# Patient Record
Sex: MALE | Race: WHITE | Employment: UNEMPLOYED | URBAN - METROPOLITAN AREA
[De-identification: names, ages, dates, MRNs, and addresses within clinical notes are randomized per-mention and may not be internally consistent; named-entity substitution may affect disease eponyms.]

---

## 2017-02-10 ENCOUNTER — APPOINTMENT (OUTPATIENT)
Dept: GENERAL RADIOLOGY | Facility: CLINIC | Age: 52
End: 2017-02-10
Attending: EMERGENCY MEDICINE
Payer: COMMERCIAL

## 2017-02-10 ENCOUNTER — HOSPITAL ENCOUNTER (EMERGENCY)
Facility: CLINIC | Age: 52
Discharge: HOME | End: 2017-02-10
Attending: EMERGENCY MEDICINE | Admitting: EMERGENCY MEDICINE
Payer: COMMERCIAL

## 2017-02-10 VITALS
BODY MASS INDEX: 23.05 KG/M2 | TEMPERATURE: 99.1 F | HEART RATE: 129 BPM | WEIGHT: 170 LBS | DIASTOLIC BLOOD PRESSURE: 118 MMHG | OXYGEN SATURATION: 96 % | RESPIRATION RATE: 20 BRPM | SYSTOLIC BLOOD PRESSURE: 166 MMHG

## 2017-02-10 DIAGNOSIS — S70.01XA CONTUSION OF RIGHT HIP, INITIAL ENCOUNTER: ICD-10-CM

## 2017-02-10 DIAGNOSIS — F10.929 ALCOHOL INTOXICATION, WITH UNSPECIFIED COMPLICATION (H): ICD-10-CM

## 2017-02-10 PROCEDURE — 73502 X-RAY EXAM HIP UNI 2-3 VIEWS: CPT

## 2017-02-10 PROCEDURE — 99283 EMERGENCY DEPT VISIT LOW MDM: CPT

## 2017-02-10 RX ORDER — ACETAMINOPHEN 500 MG
1000 TABLET ORAL ONCE
Status: DISCONTINUED | OUTPATIENT
Start: 2017-02-10 | End: 2017-02-10 | Stop reason: HOSPADM

## 2017-02-10 RX ORDER — IBUPROFEN 600 MG/1
600 TABLET, FILM COATED ORAL ONCE
Status: DISCONTINUED | OUTPATIENT
Start: 2017-02-10 | End: 2017-02-10 | Stop reason: HOSPADM

## 2017-02-10 ASSESSMENT — ENCOUNTER SYMPTOMS
NECK PAIN: 0
BACK PAIN: 0

## 2017-02-10 NOTE — ED AVS SNAPSHOT
[facility]    [address] [place] [address]    Phone:  [phone]    Fax:  [phone]                                       Sidney Varghese   MRN: [id number]    Department:  [facility]   Date of Visit:  2/10/2017           Patient Information     Date Of Birth          1965        Your diagnoses for this visit were:     Contusion of right hip, initial encounter     Alcohol intoxication, with unspecified complication (H)        You were seen by Yosef Barr MD.        Discharge Instructions       Please make an appointment to follow up with your primary care provider in 4-5 days if not improving.    Use Tylenol and/or Ibuprofen for pain or discomfort          Hip Contusion       A contusion is another word for a bruise. It happens when small blood vessels break open and leak blood into the nearby area. A hip contusion can result from a bump, hit, or fall. Symptoms of a contusion often include changes in skin color (bruising), swelling, and pain. It may take several hours for a deep bruise to show up. If the injury is severe, you may need an x-ray to check for broken bones.  Swelling should decrease in a few days. Bruising and pain may take several weeks to go away.  Home care    Unless another medicine was prescribed, you may take acetaminophen, ibuprofen, or naproxen to help relieve pain and swelling. If needed, stronger pain medicines may be prescribed. Take all medicines exactly as directed.    Ice the bruised area to help reduce pain and swelling. Wrap a cold source (ice pack or ice cubes in a plastic bag) in a thin towel. Apply the cold source to the bruised area for 20 minutes every 1 to 2 hours the first day. Continue this 3 to 4 times a day until the pain and swelling goes away.    If walking causes pain, use crutches or a walker until you can walk without pain. These items can be rented at most  pharmacies and orthopedic supply stores.    If your injury is keeping you from moving around or caring for yourself properly, you may qualify for services such as home health care. Check with your insurance company to see if this type of care is covered.  Follow-up  Follow up with your healthcare provider, or as advised.  When to seek medical advice   Call your healthcare provider right away if any of these occur:    Increased pain, bruising, or swelling near the injured area    Decreased ability to bear weight on the injured side    Pain or swelling develops below the knee    Chest pain or shortness of breath    3742-9851 [employer]. [address] [place] [address]. All rights reserved. This information is not intended as a substitute for professional medical care. Always follow your healthcare professional's instructions.          Alcohol Intoxication  Alcohol intoxication occurs when you drink alcohol faster than your liver can remove it from your system. The following facts are important to remember:    It can take 10 minutes or more to start to feel the effects of a drink, so you can easily get more intoxicated than you intended.    One drink may be more than 1 serving of alcohol. Depending on the drink, it can be 2 to 4 servings.    It takes about an hour for your body to metabolize (clear) 1 serving. If you have more than 1 drink, it can take a couple of hours or more.    Many things affect how drinks will affect you, including whether you ve eaten, how fast you drink, your size, how much you normally drink (or not), medications you take, chronic diseases you have, and gender.  Signs and symptoms of alcohol poisoning  The following are signs and symptoms of alcohol poisoning:  Mild impairment    Reduced inhibitions    Slurred speech    Drowsiness    Decreased fine motor skills  Moderate impairment    Erratic behavior, aggression, depression    Impaired  "judgment    Confusion    Concentration difficulties    Coordination problems  Severe impairment    Vomiting    Seizures    Unconsciousness    Cold, clammy    Slow or irregular breathing    Hypothermia (low body temperature)    Coma  Health effects  Alcohol abuse causes health problems. Sometimes this can happen after only drinking a  little.\" There is no set number of drinks or amount of alcohol that defines too much. The more you drink at one time, and the more frequently you drink determine both the short-term and long-term health effects. It affects all parts of your body and your health, including your:    Brain. Alcohol is a central nervous system depressant. It can damage parts of the brain that affect your balance, memory, thinking, and emotions. It can cause memory loss, blackouts, depression, agitation, sleep cycle changes, and seizures. These changes may or may not be reversible.    Heart and vascular system. Alcohol affects multiple areas. It can damage heart muscle causing cardiomyopathy, which is a weakening and stretching of the heart muscle. This can lead to trouble breathing, an irregular heartbeat, atrial fibrillation, leg swelling, and heart failure. It makes the blood vessels stiffen causing hypertension (high blood pressure). All of these problems increase your risk of having heart attacks or strokes.    Liver. Alcohol causes fat to build up in the liver, affecting its normal function. This increases the risk for hepatitis, leading to abdominal pain, appetite loss, jaundice, bleeding problems, liver fibrosis, and cirrhosis. This in turn can affect your ability to fight off infections, and can cause diabetes. The liver changes prevent it from removing toxins in your blood that can cause encephalopathy. Signs of this are confusion, altered level of consciousness, personality changes, memory loss, seizures, coma, and death.    Pancreas. Alcohol can cause inflammation of the pancreas, or " pancreatitis. This can cause pain in your abdomen, fever, and diabetes.    Immune system. Alcohol weakens your immune system in a number of ways. It suppresses your immune system making it harder to fight off infections and colds. You will also have a higher risk of certain infections like pneumonia and tuberculosis.    Cancer risk. Alcohol raises your risk of cancer of the mouth, esophagus, pharynx, larynx, liver, and breast.    Sexual function. Alcohol abuse can also lead to sexual problems.  Alcohol use during pregnancy may cause permanent damage to the growing baby.  Home care  The following guidelines will help you care for yourself at home:    Don't drink any more alcohol.    Don't drive until all effects of the alcohol have worn off.    Don't operate machinery that can cause injuries.    Get lots of rest over the next few days. Drink plenty of water and other non-alcoholic liquids. Try to eat regular meals.    If you have been drinking heavily on a daily basis, you may go through alcohol withdrawal. The usual symptoms last 3 to 4 days and may include nervousness, shakiness, nausea, sweating, sleeplessness, and can even cause seizures and a serious withdrawal symptom called delirium tremens, or DTs. During this time, it is best that you stay with family or friends who can help and support you. You can also admit yourself to a residential detox program. If your symptoms are severe (seizures, severe shakiness, confusion), contact your doctor or call an ambulance for help (see below).   Follow-up care  If alcohol is a problem in your life, these are some organizations that can help you:    Alcoholics Anonymous offers support through a self-help fellowship. There are no dues or fees. See the Yellow Pages and call for time and place of meetings. Find AA online at www.aa.org.    [employer] offers support to families of alcohol users. Contact 379-401-8885, or online at www.al-anon.org.    National [place] on Alcoholism  and Drug Dependence can be reached at 466-713-4333, or online at www.Zoomdata.org.    There are also inpatient and residential alcohol detox programs. Check the Internet or phonebook Yellow Pages under  Drug Abuse and Treatment Centers.   Call 911  Call 911 if any of these occur:    Trouble breathing or slow irregular breathing    Chest pain    Sudden weakness on one side of your body or sudden trouble speaking    Heavy bleeding or vomiting blood    Very drowsy or trouble awakening    Fainting or loss of consciousness    Rapid heart rate    Seizure  When to seek medical care  Get prompt medical attention if any of the following occur:    Severe shakiness     Fever over 100.4  F (38.0  C)    Confusion or hallucinations (seeing, hearing, or feeling things that are not there)    Pain in your upper abdomen that gets worse    Repeated vomiting    7417-8153 [employer]. [address] [place], [place] [address]. All rights reserved. This information is not intended as a substitute for professional medical care. Always follow your healthcare professional's instructions.            24 Hour Appointment Hotline       To make an appointment at any [facility], call [phone] ([phone]). If you don't have a family doctor or clinic, we will help you find one. [facility] clinics are conveniently located to serve the needs of you and your family.             Review of your medicines      Notice     You have not been prescribed any medications.            Procedures and tests performed during your visit     XR Pelvis and Hip Right 2 Views      Orders Needing Specimen Collection     None      Pending Results     Date and Time Order Name Status Description    2/10/2017 1824 XR Pelvis and Hip Right 2 Views Preliminary             Pending Culture Results     No orders found from 2/9/2017 to 2/11/2017.       Test Results from your hospital stay           2/10/2017  7:08 PM - Interface, Radiant Ib      Narrative      PELVIS WITH RIGHT HIP LATERAL TWO VIEWS  2/10/2017 6:47 PM     HISTORY: Fall, hip pain.    COMPARISON: 8/16/2016        Impression     IMPRESSION: No evidence of fracture. No change.                Clinical Quality Measure: Blood Pressure Screening     Your blood pressure was checked while you were in the emergency department today. The last reading we obtained was  BP: (!) 166/118 mmHg . Please read the guidelines below about what these numbers mean and what you should do about them.  If your systolic blood pressure (the top number) is less than 120 and your diastolic blood pressure (the bottom number) is less than 80, then your blood pressure is normal. There is nothing more that you need to do about it.  If your systolic blood pressure (the top number) is 120-139 or your diastolic blood pressure (the bottom number) is 80-89, your blood pressure may be higher than it should be. You should have your blood pressure rechecked within a year by a primary care provider.  If your systolic blood pressure (the top number) is 140 or greater or your diastolic blood pressure (the bottom number) is 90 or greater, you may have high blood pressure. High blood pressure is treatable, but if left untreated over time it can put you at risk for heart attack, stroke, or kidney failure. You should have your blood pressure rechecked by a primary care provider within the next 4 weeks.  If your provider in the emergency department today gave you specific instructions to follow-up with your doctor or provider even sooner than that, you should follow that instruction and not wait for up to 4 weeks for your follow-up visit.        Thank you for choosing [facility]       Thank you for choosing [facility] for your care. Our goal is always to provide you with excellent care. Hearing back from our patients is one way we can continue to improve our services. Please take a few minutes to complete the written survey that you may receive in the mail  "after you visit with us. Thank you!        [facility] Information     [employer] lets you send messages to your doctor, view your test results, renew your prescriptions, schedule appointments and more. To sign up, go to [email or web address] . Click on \"Log in\" on the left side of the screen, which will take you to the Welcome page. Then click on \"Sign up Now\" on the right side of the page.     You will be asked to enter the access code listed below, as well as some personal information. Please follow the directions to create your username and password.     Your access code is: [email or web address]  Expires: 2017  7:21 PM     Your access code will  in 90 days. If you need help or a new code, please call your [facility] clinic or [phone].        Care EveryWhere ID     This is your Care EveryWhere ID. This could be used by other organizations to access your [facility] medical records  [id number]        After Visit Summary       This is your record. Keep this with you and show to your community pharmacist(s) and doctor(s) at your next visit.                  "

## 2017-02-10 NOTE — ED AVS SNAPSHOT
[facility]    [address]    [facility] [address]    Phone:  [phone]    Fax:  [phone]                                       Sidney Varghese   MRN: [id number]    Department:  [facility] Department   Date of Visit:  2/10/2017           After Visit Summary Signature Page     I have received my discharge instructions, and my questions have been answered. I have discussed any challenges I see with this plan with the nurse or doctor.    ..........................................................................................................................................  Patient/Patient Representative Signature      ..........................................................................................................................................  Patient Representative Print Name and Relationship to Patient    ..................................................               ................................................  Date                                            Time    ..........................................................................................................................................  Reviewed by Signature/Title    ...................................................              ..............................................  Date                                                            Time

## 2017-02-11 NOTE — ED NOTES
Pt presents to ED via EMS c/o right hip pain. Pt states he slipped on the ice and landed onto his right hip. Denies hitting his head or LOC. Pt is able to bear weight. Breathalyzer on scene .345. CMS intact.

## 2017-02-11 NOTE — ED NOTES
Bed: ED14  Expected date: 2/10/17  Expected time: 5:58 PM  Means of arrival: Ambulance  Comments:  [address]

## 2017-02-11 NOTE — DISCHARGE INSTRUCTIONS
Please make an appointment to follow up with your primary care provider in 4-5 days if not improving.    Use Tylenol and/or Ibuprofen for pain or discomfort          Hip Contusion       A contusion is another word for a bruise. It happens when small blood vessels break open and leak blood into the nearby area. A hip contusion can result from a bump, hit, or fall. Symptoms of a contusion often include changes in skin color (bruising), swelling, and pain. It may take several hours for a deep bruise to show up. If the injury is severe, you may need an x-ray to check for broken bones.  Swelling should decrease in a few days. Bruising and pain may take several weeks to go away.  Home care    Unless another medicine was prescribed, you may take acetaminophen, ibuprofen, or naproxen to help relieve pain and swelling. If needed, stronger pain medicines may be prescribed. Take all medicines exactly as directed.    Ice the bruised area to help reduce pain and swelling. Wrap a cold source (ice pack or ice cubes in a plastic bag) in a thin towel. Apply the cold source to the bruised area for 20 minutes every 1 to 2 hours the first day. Continue this 3 to 4 times a day until the pain and swelling goes away.    If walking causes pain, use crutches or a walker until you can walk without pain. These items can be rented at most pharmacies and orthopedic supply stores.    If your injury is keeping you from moving around or caring for yourself properly, you may qualify for services such as home health care. Check with your insurance company to see if this type of care is covered.  Follow-up  Follow up with your healthcare provider, or as advised.  When to seek medical advice   Call your healthcare provider right away if any of these occur:    Increased pain, bruising, or swelling near the injured area    Decreased ability to bear weight on the injured side    Pain or swelling develops below the knee    Chest pain or shortness of  "breath    6014-5650 [employer] [address] [place] [address], [place] [address]. All rights reserved. This information is not intended as a substitute for professional medical care. Always follow your healthcare professional's instructions.          Alcohol Intoxication  Alcohol intoxication occurs when you drink alcohol faster than your liver can remove it from your system. The following facts are important to remember:    It can take 10 minutes or more to start to feel the effects of a drink, so you can easily get more intoxicated than you intended.    One drink may be more than 1 serving of alcohol. Depending on the drink, it can be 2 to 4 servings.    It takes about an hour for your body to metabolize (clear) 1 serving. If you have more than 1 drink, it can take a couple of hours or more.    Many things affect how drinks will affect you, including whether you ve eaten, how fast you drink, your size, how much you normally drink (or not), medications you take, chronic diseases you have, and gender.  Signs and symptoms of alcohol poisoning  The following are signs and symptoms of alcohol poisoning:  Mild impairment    Reduced inhibitions    Slurred speech    Drowsiness    Decreased fine motor skills  Moderate impairment    Erratic behavior, aggression, depression    Impaired judgment    Confusion    Concentration difficulties    Coordination problems  Severe impairment    Vomiting    Seizures    Unconsciousness    Cold, clammy    Slow or irregular breathing    Hypothermia (low body temperature)    Coma  Health effects  Alcohol abuse causes health problems. Sometimes this can happen after only drinking a  little.\" There is no set number of drinks or amount of alcohol that defines too much. The more you drink at one time, and the more frequently you drink determine both the short-term and long-term health effects. It affects all parts of your body and your health, including your:    Brain. Alcohol is a " central nervous system depressant. It can damage parts of the brain that affect your balance, memory, thinking, and emotions. It can cause memory loss, blackouts, depression, agitation, sleep cycle changes, and seizures. These changes may or may not be reversible.    Heart and vascular system. Alcohol affects multiple areas. It can damage heart muscle causing cardiomyopathy, which is a weakening and stretching of the heart muscle. This can lead to trouble breathing, an irregular heartbeat, atrial fibrillation, leg swelling, and heart failure. It makes the blood vessels stiffen causing hypertension (high blood pressure). All of these problems increase your risk of having heart attacks or strokes.    Liver. Alcohol causes fat to build up in the liver, affecting its normal function. This increases the risk for hepatitis, leading to abdominal pain, appetite loss, jaundice, bleeding problems, liver fibrosis, and cirrhosis. This in turn can affect your ability to fight off infections, and can cause diabetes. The liver changes prevent it from removing toxins in your blood that can cause encephalopathy. Signs of this are confusion, altered level of consciousness, personality changes, memory loss, seizures, coma, and death.    Pancreas. Alcohol can cause inflammation of the pancreas, or pancreatitis. This can cause pain in your abdomen, fever, and diabetes.    Immune system. Alcohol weakens your immune system in a number of ways. It suppresses your immune system making it harder to fight off infections and colds. You will also have a higher risk of certain infections like pneumonia and tuberculosis.    Cancer risk. Alcohol raises your risk of cancer of the mouth, esophagus, pharynx, larynx, liver, and breast.    Sexual function. Alcohol abuse can also lead to sexual problems.  Alcohol use during pregnancy may cause permanent damage to the growing baby.  Home care  The following guidelines will help you care for yourself at  home:    Don't drink any more alcohol.    Don't drive until all effects of the alcohol have worn off.    Don't operate machinery that can cause injuries.    Get lots of rest over the next few days. Drink plenty of water and other non-alcoholic liquids. Try to eat regular meals.    If you have been drinking heavily on a daily basis, you may go through alcohol withdrawal. The usual symptoms last 3 to 4 days and may include nervousness, shakiness, nausea, sweating, sleeplessness, and can even cause seizures and a serious withdrawal symptom called delirium tremens, or DTs. During this time, it is best that you stay with family or friends who can help and support you. You can also admit yourself to a residential detox program. If your symptoms are severe (seizures, severe shakiness, confusion), contact your doctor or call an ambulance for help (see below).   Follow-up care  If alcohol is a problem in your life, these are some organizations that can help you:    Alcoholics Anonymous offers support through a self-help fellowship. There are no dues or fees. See the Yellow Pages and call for time and place of meetings. Find AA online at www.aa.org.    [employer] offers support to families of alcohol users. Contact 262-565-5139, or online at www.al-anon.org.    National [place] on Alcoholism and Drug Dependence can be reached at 477-810-9941, or online at www.ncadd.org.    There are also inpatient and residential alcohol detox programs. Check the Internet or phonebook Yellow Pages under  Drug Abuse and Treatment Centers.   Call 911  Call 911 if any of these occur:    Trouble breathing or slow irregular breathing    Chest pain    Sudden weakness on one side of your body or sudden trouble speaking    Heavy bleeding or vomiting blood    Very drowsy or trouble awakening    Fainting or loss of consciousness    Rapid heart rate    Seizure  When to seek medical care  Get prompt medical attention if any of the following occur:    Severe  shakiness     Fever over 100.4  F (38.0  C)    Confusion or hallucinations (seeing, hearing, or feeling things that are not there)    Pain in your upper abdomen that gets worse    Repeated vomiting    8219-3693 [employer]. [address] [place] [address], [place] [address]. All rights reserved. This information is not intended as a substitute for professional medical care. Always follow your healthcare professional's instructions.

## 2017-02-11 NOTE — ED PROVIDER NOTES
History     Chief Complaint:  Right Hip Pain    HPI   Sidney Varghese is a 51 year old male with a history of scoliosis who presents to the emergency department for evaluation of right hip pain following a mechanical fall. The patient, who admits to drinking alcohol this evening, slipped and fell on the ice, landing on his right hip. He denies striking his head, any loss of consciousness, or any injury to his neck, back,, bilateral upper extremities, or left lower extremity pain following this incident. EMS was contacted following this fall and the patient was brought her in the emergency department for further evaluation.  Of note, the patient states that he has a prior injury to his right ankle.    Allergies:  NKDA     Medications:    The patient is currently on no regular medications.      Past Medical History:    Scoliosis    Past Surgical History:    C4-6 cervical fusion  Orthopedic ankle surgery    Family / Social History:    No past pertinent family history.     Social History:  Presents with his friend.  Current Every day smoker, 0.50 ppd.  Positive for alcohol use.   Denies any elicit drug use.  Marital Status:  Single [1]    Review of Systems   Musculoskeletal: Negative for back pain and neck pain.        Positive for right hip pain.     All other systems reviewed and are negative.    Physical Exam     Patient Vitals for the past 24 hrs:   BP Temp Temp src Pulse Resp SpO2 Weight   02/10/17 1814 (!) 166/118 mmHg 99.1  F (37.3  C) Oral 129 20 96 % 77.111 kg (170 lb)       Physical Exam   HENT:   Head: Atraumatic.   Right Ear: External ear normal.   Left Ear: External ear normal.   Nose: Nose normal.   Eyes: Conjunctivae and lids are normal.   Neck: Neck supple. No tracheal deviation present.   Cardiovascular: Regular rhythm and intact distal pulses.    Pulmonary/Chest: Breath sounds normal. No respiratory distress.   Abdominal: Soft. He exhibits no distension. There is no tenderness. There is no rebound  and no guarding.   Musculoskeletal:   Skeletal survey unremarkable except + ttp R greater troch, mild swelling here, closed injury.  Able to weight bear.    Neurological:   Intoxicated but alert and able to converse, answer questions, CN 2-12 intact, MAEE, no gross focal motor or sensory deficit   Skin: Skin is warm and dry. He is not diaphoretic.   Psychiatric: He has a normal mood and affect.   Nursing note and vitals reviewed.        Emergency Department Course   Imaging:  Radiographic findings were communicated with the patient who voiced understanding of the findings.    XR Hip and Pelvis, right, 2 views:   No evidence of fracture. No change. As per radiology.     Emergency Department Course:  Nursing notes and vitals reviewed. I performed an exam of the patient as documented above.     The patient was sent for a Pelvis XR while in the emergency department, findings above.     1918 I reevaluated the patient and provided an update in regards to his ED course.  The patient was ambulated here in the emergency department without difficulty.     Findings and plan explained to the Patient. Patient discharged home with instructions regarding supportive care, medications, and reasons to return. The importance of close follow-up was reviewed.   I personally reviewed the imaging results with the Patient and answered all related questions prior to discharge.     Impression & Plan    Medical Decision Making:  Sidney Varghese is a 51 year old male here after a mechanical fall and acutely intoxicated with alcohol with right hip pain. He is able to bear weight. The rest of his traumatic surgery is non remarkable, including no signs of trauma in head and no cervical neck pain. We will do an XR of the pelvis and right hip to evaluate for injury. Expect this to be negative and discharge home. XR is negative on hip and pelvis. Plan will be to discharge to home and follow up with primary care as needed.       Diagnosis:     ICD-10-CM   1. Contusion of right hip, initial encounter S70.01XA   2. Alcohol intoxication, with unspecified complication (H) F10.129     Disposition:  discharged to home    IAurora, am serving as a scribe on 2/10/2017 at 6:20 PM to personally document services performed by Yosef Barr MD based on my observations and the provider's statements to me.     Aurora Morales  2/10/2017   [facility] EMERGENCY DEPARTMENT        Yosef Barr MD  02/11/17 0045

## 2017-02-27 ENCOUNTER — HOSPITAL ENCOUNTER (EMERGENCY)
Facility: CLINIC | Age: 52
Discharge: HOME | End: 2017-02-27
Attending: EMERGENCY MEDICINE | Admitting: EMERGENCY MEDICINE
Payer: COMMERCIAL

## 2017-02-27 VITALS
DIASTOLIC BLOOD PRESSURE: 107 MMHG | HEART RATE: 95 BPM | WEIGHT: 170 LBS | OXYGEN SATURATION: 96 % | TEMPERATURE: 98.4 F | RESPIRATION RATE: 16 BRPM | SYSTOLIC BLOOD PRESSURE: 171 MMHG | BODY MASS INDEX: 23.06 KG/M2

## 2017-02-27 DIAGNOSIS — I10 BENIGN ESSENTIAL HYPERTENSION: ICD-10-CM

## 2017-02-27 DIAGNOSIS — F10.220 ALCOHOL INTOXICATION IN ACTIVE ALCOHOLIC, UNCOMPLICATED (H): ICD-10-CM

## 2017-02-27 LAB
ALCOHOL BREATH TEST: 0.11 (ref 0–0.01)
ALCOHOL BREATH TEST: 0.18 (ref 0–0.01)
ALCOHOL BREATH TEST: 0.23 (ref 0–0.01)

## 2017-02-27 PROCEDURE — 99285 EMERGENCY DEPT VISIT HI MDM: CPT

## 2017-02-27 PROCEDURE — 25000132 ZZH RX MED GY IP 250 OP 250 PS 637: Performed by: EMERGENCY MEDICINE

## 2017-02-27 PROCEDURE — 82075 ASSAY OF BREATH ETHANOL: CPT | Mod: 76

## 2017-02-27 PROCEDURE — 99284 EMERGENCY DEPT VISIT MOD MDM: CPT | Mod: Z6 | Performed by: EMERGENCY MEDICINE

## 2017-02-27 RX ORDER — LORAZEPAM 1 MG/1
1 TABLET ORAL ONCE
Status: COMPLETED | OUTPATIENT
Start: 2017-02-27 | End: 2017-02-27

## 2017-02-27 RX ADMIN — LORAZEPAM 1 MG: 1 TABLET ORAL at 19:09

## 2017-02-27 NOTE — ED AVS SNAPSHOT
[facility], Emergency Department    [address]    [place] [address]    Phone:  [phone]    Fax:  [phone]                                       Sidney Varghese   MRN: [id number]    Department:  [facility], Emergency Department   Date of Visit:  2/27/2017           Patient Information     Date Of Birth          1965        Your diagnoses for this visit were:     Alcohol intoxication in active alcoholic, uncomplicated (H)     Benign essential hypertension        You were seen by Halima Smith MD.        Discharge Instructions       Thank you for coming to the [facility] Emergency Department.     Please return to the ER at any time if you are interested in entering detox to help start your sobriety from alcohol.     Also return for:  Suicidal thoughts  Confusion, agitation or seizures from alcohol withdrawal      Please follow up with your primary care clinic to restart treatment of your elevated blood pressure. Care of this issue is important for your long-term health to prevent strokes and heart disease.     24 Hour Appointment Hotline       To make an appointment at any [facility] clinic, call [phone] ([phone]). If you don't have a family doctor or clinic, we will help you find one. [facility] clinics are conveniently located to serve the needs of you and your family.             Review of your medicines      Notice     You have not been prescribed any medications.            Procedures and tests performed during your visit     Procedure/Test Number of Times Performed    Alcohol breath test POCT 3    Medication History IP Pharmacy Consult 1      Orders Needing Specimen Collection     Ordered          02/27/17 1813  Drug abuse screen 6 urine (tox) - STAT, [facility]: STAT, Needs to be Collected     Scheduled Task Status   02/27/17 1814 Collect Drug abuse screen 6 urine (tox) Open   Order Class:  PCU Collect                  Pending Results     No  "orders found from 2017 to 2017.            Pending Culture Results     No orders found from 2017 to 2017.            Thank you for choosing [facility]       Thank you for choosing [facility] for your care. Our goal is always to provide you with excellent care. Hearing back from our patients is one way we can continue to improve our services. Please take a few minutes to complete the written survey that you may receive in the mail after you visit with us. Thank you!        [facility] Information     [employer] lets you send messages to your doctor, view your test results, renew your prescriptions, schedule appointments and more. To sign up, go to [email or web address] . Click on \"Log in\" on the left side of the screen, which will take you to the Welcome page. Then click on \"Sign up Now\" on the right side of the page.     You will be asked to enter the access code listed below, as well as some personal information. Please follow the directions to create your username and password.     Your access code is: [email or web address]  Expires: 2017  7:21 PM     Your access code will  in 90 days. If you need help or a new code, please call your [facility] clinic or [phone].        Care EveryWhere ID     This is your Care EveryWhere ID. This could be used by other organizations to access your [facility] medical records  [id number]        After Visit Summary       This is your record. Keep this with you and show to your community pharmacist(s) and doctor(s) at your next visit.                  "

## 2017-02-27 NOTE — PHARMACY-ADMISSION MEDICATION HISTORY
Admission Medication History status for the 2/27/2017 admission is complete.  See EPIC admission navigator for Prior to Admission medications.    Medication history sources:  Patient     Medication history source reliability: Good    Medication adherence:  N/A (patient states he does not currently take any medications)    Changes made to PTA medication list (reason)  Added: None  Deleted: None  Changed: None    Additional medication history information (including reliability of information, actions taken by pharmacist):   -Patient was a good historian; conscious and answered questions coherently  -Patient states he is not currently taking any medications nor any over-the-counter medications  -Patient has not received the flu vaccine this year    Time spent in this activity: 2 minutes    Medication history completed by: JULIA Chandler Pharmacy Intern    Prior to Admission medications    Not on File
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
## 2017-02-27 NOTE — ED AVS SNAPSHOT
[facility], [place], Emergency Department    [address]    [place] [address]    Phone:  [phone]    Fax:  [phone]                                       Sidney Varghese   MRN: [id number]    Department:  [facility], Emergency Department   Date of Visit:  2/27/2017           After Visit Summary Signature Page     I have received my discharge instructions, and my questions have been answered. I have discussed any challenges I see with this plan with the nurse or doctor.    ..........................................................................................................................................  Patient/Patient Representative Signature      ..........................................................................................................................................  Patient Representative Print Name and Relationship to Patient    ..................................................               ................................................  Date                                            Time    ..........................................................................................................................................  Reviewed by Signature/Title    ...................................................              ..............................................  Date                                                            Time

## 2017-02-27 NOTE — ED NOTES
Called to patient room resisting safety screen somewhat and wanting to leave. This nurse de-escalated situation, pt now compying with our requests to complete screen and pt is trying to call for a sober ride

## 2017-02-28 NOTE — DISCHARGE INSTRUCTIONS
Thank you for coming to the [facility] Emergency Department.     Please return to the ER at any time if you are interested in entering detox to help start your sobriety from alcohol.     Also return for:  Suicidal thoughts  Confusion, agitation or seizures from alcohol withdrawal      Please follow up with your primary care clinic to restart treatment of your elevated blood pressure. Care of this issue is important for your long-term health to prevent strokes and heart disease.

## 2017-05-11 ENCOUNTER — TRANSFERRED RECORDS (OUTPATIENT)
Dept: HEALTH INFORMATION MANAGEMENT | Facility: CLINIC | Age: 52
End: 2017-05-11

## 2017-07-07 ENCOUNTER — MEDICAL CORRESPONDENCE (OUTPATIENT)
Dept: HEALTH INFORMATION MANAGEMENT | Facility: CLINIC | Age: 52
End: 2017-07-07

## 2017-08-07 ENCOUNTER — HOSPITAL ENCOUNTER (EMERGENCY)
Facility: CLINIC | Age: 52
Discharge: PSYCHIATRIC OR SUBSTANCE USE FACILITY | End: 2017-08-07
Attending: EMERGENCY MEDICINE | Admitting: EMERGENCY MEDICINE

## 2017-08-07 ENCOUNTER — HOSPITAL ENCOUNTER (INPATIENT)
Facility: CLINIC | Age: 52
LOS: 18 days | Discharge: PSYCHIATRIC OR SUBSTANCE USE FACILITY | DRG: 885 | End: 2017-08-25
Attending: PSYCHIATRY & NEUROLOGY | Admitting: PSYCHIATRY & NEUROLOGY

## 2017-08-07 VITALS
DIASTOLIC BLOOD PRESSURE: 115 MMHG | TEMPERATURE: 98.3 F | SYSTOLIC BLOOD PRESSURE: 169 MMHG | BODY MASS INDEX: 24.22 KG/M2 | WEIGHT: 178.57 LBS | OXYGEN SATURATION: 97 %

## 2017-08-07 DIAGNOSIS — F32.A DEPRESSION, UNSPECIFIED DEPRESSION TYPE: ICD-10-CM

## 2017-08-07 DIAGNOSIS — R45.851 SUICIDAL IDEATION: ICD-10-CM

## 2017-08-07 LAB
ALBUMIN SERPL-MCNC: 3.8 G/DL (ref 3.4–5)
ALP SERPL-CCNC: 106 U/L (ref 40–150)
ALT SERPL W P-5'-P-CCNC: 39 U/L (ref 0–70)
ANION GAP SERPL CALCULATED.3IONS-SCNC: 10 MMOL/L (ref 3–14)
APAP SERPL-MCNC: NORMAL MG/L (ref 10–20)
AST SERPL W P-5'-P-CCNC: 60 U/L (ref 0–45)
BASOPHILS # BLD AUTO: 0 10E9/L (ref 0–0.2)
BASOPHILS NFR BLD AUTO: 0.2 %
BILIRUB SERPL-MCNC: 0.4 MG/DL (ref 0.2–1.3)
BUN SERPL-MCNC: 10 MG/DL (ref 7–30)
CALCIUM SERPL-MCNC: 8.4 MG/DL (ref 8.5–10.1)
CHLORIDE SERPL-SCNC: 105 MMOL/L (ref 94–109)
CO2 SERPL-SCNC: 27 MMOL/L (ref 20–32)
CREAT SERPL-MCNC: 0.68 MG/DL (ref 0.66–1.25)
DIFFERENTIAL METHOD BLD: ABNORMAL
EOSINOPHIL # BLD AUTO: 0 10E9/L (ref 0–0.7)
EOSINOPHIL NFR BLD AUTO: 0.5 %
ERYTHROCYTE [DISTWIDTH] IN BLOOD BY AUTOMATED COUNT: 11.8 % (ref 10–15)
ETHANOL SERPL-MCNC: 0.16 G/DL
GFR SERPL CREATININE-BSD FRML MDRD: ABNORMAL ML/MIN/1.7M2
GLUCOSE SERPL-MCNC: 87 MG/DL (ref 70–99)
HCT VFR BLD AUTO: 41.5 % (ref 40–53)
HGB BLD-MCNC: 14 G/DL (ref 13.3–17.7)
IMM GRANULOCYTES # BLD: 0 10E9/L (ref 0–0.4)
IMM GRANULOCYTES NFR BLD: 0.5 %
LYMPHOCYTES # BLD AUTO: 1.2 10E9/L (ref 0.8–5.3)
LYMPHOCYTES NFR BLD AUTO: 28.3 %
MCH RBC QN AUTO: 32.9 PG (ref 26.5–33)
MCHC RBC AUTO-ENTMCNC: 33.7 G/DL (ref 31.5–36.5)
MCV RBC AUTO: 98 FL (ref 78–100)
MONOCYTES # BLD AUTO: 0.3 10E9/L (ref 0–1.3)
MONOCYTES NFR BLD AUTO: 7.6 %
NEUTROPHILS # BLD AUTO: 2.7 10E9/L (ref 1.6–8.3)
NEUTROPHILS NFR BLD AUTO: 62.9 %
NRBC # BLD AUTO: 0 10*3/UL
NRBC BLD AUTO-RTO: 0 /100
PLATELET # BLD AUTO: 251 10E9/L (ref 150–450)
POTASSIUM SERPL-SCNC: 3.7 MMOL/L (ref 3.4–5.3)
PROT SERPL-MCNC: 7.6 G/DL (ref 6.8–8.8)
RBC # BLD AUTO: 4.25 10E12/L (ref 4.4–5.9)
SALICYLATES SERPL-MCNC: 3 MG/DL
SODIUM SERPL-SCNC: 142 MMOL/L (ref 133–144)
WBC # BLD AUTO: 4.3 10E9/L (ref 4–11)

## 2017-08-07 PROCEDURE — 25000132 ZZH RX MED GY IP 250 OP 250 PS 637: Performed by: EMERGENCY MEDICINE

## 2017-08-07 PROCEDURE — 85025 COMPLETE CBC W/AUTO DIFF WBC: CPT | Performed by: EMERGENCY MEDICINE

## 2017-08-07 PROCEDURE — 25000128 H RX IP 250 OP 636: Performed by: EMERGENCY MEDICINE

## 2017-08-07 PROCEDURE — 90791 PSYCH DIAGNOSTIC EVALUATION: CPT

## 2017-08-07 PROCEDURE — 80329 ANALGESICS NON-OPIOID 1 OR 2: CPT | Performed by: EMERGENCY MEDICINE

## 2017-08-07 PROCEDURE — 80320 DRUG SCREEN QUANTALCOHOLS: CPT | Performed by: EMERGENCY MEDICINE

## 2017-08-07 PROCEDURE — 12400001 ZZH R&B MH UMMC

## 2017-08-07 PROCEDURE — HZ2ZZZZ DETOXIFICATION SERVICES FOR SUBSTANCE ABUSE TREATMENT: ICD-10-PCS | Performed by: PSYCHIATRY & NEUROLOGY

## 2017-08-07 PROCEDURE — 96372 THER/PROPH/DIAG INJ SC/IM: CPT

## 2017-08-07 PROCEDURE — 36415 COLL VENOUS BLD VENIPUNCTURE: CPT | Performed by: EMERGENCY MEDICINE

## 2017-08-07 PROCEDURE — 99285 EMERGENCY DEPT VISIT HI MDM: CPT | Mod: 25

## 2017-08-07 PROCEDURE — 80053 COMPREHEN METABOLIC PANEL: CPT | Performed by: EMERGENCY MEDICINE

## 2017-08-07 RX ORDER — LANOLIN ALCOHOL/MO/W.PET/CERES
100 CREAM (GRAM) TOPICAL DAILY
Status: COMPLETED | OUTPATIENT
Start: 2017-08-08 | End: 2017-08-10

## 2017-08-07 RX ORDER — NICOTINE 21 MG/24HR
1 PATCH, TRANSDERMAL 24 HOURS TRANSDERMAL DAILY
Status: DISCONTINUED | OUTPATIENT
Start: 2017-08-07 | End: 2017-08-07 | Stop reason: HOSPADM

## 2017-08-07 RX ORDER — ALUMINA, MAGNESIA, AND SIMETHICONE 2400; 2400; 240 MG/30ML; MG/30ML; MG/30ML
30 SUSPENSION ORAL EVERY 4 HOURS PRN
Status: DISCONTINUED | OUTPATIENT
Start: 2017-08-07 | End: 2017-08-25 | Stop reason: HOSPADM

## 2017-08-07 RX ORDER — BISACODYL 10 MG
10 SUPPOSITORY, RECTAL RECTAL DAILY PRN
Status: DISCONTINUED | OUTPATIENT
Start: 2017-08-07 | End: 2017-08-25 | Stop reason: HOSPADM

## 2017-08-07 RX ORDER — FOLIC ACID 1 MG/1
1 TABLET ORAL DAILY
Status: DISCONTINUED | OUTPATIENT
Start: 2017-08-08 | End: 2017-08-25 | Stop reason: HOSPADM

## 2017-08-07 RX ORDER — NICOTINE 21 MG/24HR
1 PATCH, TRANSDERMAL 24 HOURS TRANSDERMAL DAILY
Status: DISCONTINUED | OUTPATIENT
Start: 2017-08-08 | End: 2017-08-25 | Stop reason: HOSPADM

## 2017-08-07 RX ORDER — HYDROXYZINE HYDROCHLORIDE 25 MG/1
25-50 TABLET, FILM COATED ORAL EVERY 4 HOURS PRN
Status: DISCONTINUED | OUTPATIENT
Start: 2017-08-07 | End: 2017-08-25 | Stop reason: HOSPADM

## 2017-08-07 RX ORDER — ZIPRASIDONE MESYLATE 20 MG/ML
20 INJECTION, POWDER, LYOPHILIZED, FOR SOLUTION INTRAMUSCULAR ONCE
Status: COMPLETED | OUTPATIENT
Start: 2017-08-07 | End: 2017-08-07

## 2017-08-07 RX ORDER — ATENOLOL 50 MG/1
50 TABLET ORAL DAILY PRN
Status: DISCONTINUED | OUTPATIENT
Start: 2017-08-07 | End: 2017-08-10

## 2017-08-07 RX ORDER — ACETAMINOPHEN 325 MG/1
650 TABLET ORAL EVERY 4 HOURS PRN
Status: DISCONTINUED | OUTPATIENT
Start: 2017-08-07 | End: 2017-08-25 | Stop reason: HOSPADM

## 2017-08-07 RX ORDER — TRAZODONE HYDROCHLORIDE 50 MG/1
50 TABLET, FILM COATED ORAL
Status: DISCONTINUED | OUTPATIENT
Start: 2017-08-07 | End: 2017-08-22

## 2017-08-07 RX ORDER — OLANZAPINE 10 MG/1
10 TABLET ORAL
Status: DISCONTINUED | OUTPATIENT
Start: 2017-08-07 | End: 2017-08-25 | Stop reason: HOSPADM

## 2017-08-07 RX ORDER — LORAZEPAM 1 MG/1
1-4 TABLET ORAL EVERY 30 MIN PRN
Status: DISCONTINUED | OUTPATIENT
Start: 2017-08-07 | End: 2017-08-10

## 2017-08-07 RX ORDER — MULTIPLE VITAMINS W/ MINERALS TAB 9MG-400MCG
1 TAB ORAL DAILY
Status: DISCONTINUED | OUTPATIENT
Start: 2017-08-08 | End: 2017-08-25 | Stop reason: HOSPADM

## 2017-08-07 RX ORDER — OLANZAPINE 10 MG/2ML
10 INJECTION, POWDER, FOR SOLUTION INTRAMUSCULAR
Status: DISCONTINUED | OUTPATIENT
Start: 2017-08-07 | End: 2017-08-25 | Stop reason: HOSPADM

## 2017-08-07 RX ADMIN — NICOTINE 1 PATCH: 14 PATCH, EXTENDED RELEASE TRANSDERMAL at 19:27

## 2017-08-07 RX ADMIN — ZIPRASIDONE MESYLATE 20 MG: 20 INJECTION, POWDER, LYOPHILIZED, FOR SOLUTION INTRAMUSCULAR at 20:45

## 2017-08-07 ASSESSMENT — ACTIVITIES OF DAILY LIVING (ADL)
DRESS: INDEPENDENT
GROOMING: INDEPENDENT
LAUNDRY: WITH SUPERVISION
ORAL_HYGIENE: INDEPENDENT

## 2017-08-07 NOTE — IP AVS SNAPSHOT
MRN:[id number]                      After Visit Summary   8/7/2017    Sidney Varghese    MRN: [id number]           Thank you!     Thank you for choosing [facility] for your care. Our goal is always to provide you with excellent care.        Patient Information     Date Of Birth          1965        Designated Caregiver       Most Recent Value    Caregiver    Will someone help with your care after discharge? no      About your hospital stay     You were admitted on:  August 7, 2017 You last received care in the:  [facility] 10[facility]    You were discharged on:  August 25, 2017       Who to Call     For medical emergencies, please call 911.  For non-urgent questions about your medical care, please call your primary care provider or clinic, [phone]          Attending Provider     Provider Specialty    Gutierrez Yates MD Psychiatry       Primary Care Provider Office Phone # Fax #    [facility] [phone]      Further instructions from your care team       Behavioral Discharge Planning and Instructions      Summary:  You were admitted on 8/7/2017  For Depression and Substance Use Disorder.  You were treated by Dr. Gutierrez Yates MD and discharged on 08/25/2017 from [facility] to [facility]     Main Diagnosis: Major depressive disorder, recurrent, severe with the possibility of psychosis.   Alcohol use disorder, severe dependence.   Nicotine use disorder.     Health Care Follow-up Appointments:   [place] [address]  Tel:  [phone]        Fax: [phone] HUC TO FAX DISCHARGE PAPERWORK     If no appointments scheduled, you are discharging with a 30 day supply of your medications, after you have settled at the treatment center, please make an appointment with your PCP or psychiatrist.   Attend all scheduled appointments with your outpatient providers. Call at least  "24 hours in advance if you need to reschedule an appointment to ensure continued access to your outpatient providers.   Major Treatments, Procedures and Findings:  You were provided with: a psychiatric assessment, assessed for medical stability, medication evaluation and/or management, group therapy, CD evaluation/assessment and milieu management    Symptoms to Report: feeling more aggressive, increased confusion, losing more sleep, mood getting worse or thoughts of suicide    Early warning signs can include: increased depression or anxiety sleep disturbances increased thoughts or behaviors of suicide or self-harm  increased unusual thinking, such as paranoia or hearing voices    Safety and Wellness:  Take all medicines as directed.  Make no changes unless your doctor suggests them.      Follow treatment recommendations.  Refrain from alcohol and non-prescribed drugs.  Ask your support system to help you reduce your access to items that could harm yourself or others. If there is a concern for safety, call 911.    Resources:   Crisis Intervention: 832.674.9869 or 278-747-8579 (TTY: 542.615.6817).  Call anytime for help.  National [place] on Mental Illness (www.mn.brissa.org): 709.987.4930 or 385-288-5689.  Alcoholics Anonymous (www.alcoholics-anonymous.org): Check your phone book for your local chapter.  Suicide Awareness Voices of Education (SAVE) (www.save.org): 771-776-HONE (0707)  National Suicide Prevention Line (www.mentalhealthmn.org): 980-664-UOHA (8635)  Mental Health Consumer/Survivor Network of [place] (www.mhcsn.net): 593.785.1927 or 115-019-0573  Mental Health Association of [place] (www.mentalhealth.org): 917.977.1177 or 719-595-9942  Self- Management and Recovery Training., SMART-- Toll free: 440.820.5055  www.Perpetuall.org  [facility] Crisis Response 162-904-7414  [facility] Crisis Response 723-829-8061  Text 4 Life: txt \"LIFE\" to 62004 for immediate support and crisis intervention  Crisis text " "line: Text \"START\" to 561-662. Free, confidential, .  Crisis Intervention: 795.967.7138 or 741-230-1265. Call anytime for help.   [facility] Crisis Team:  [phone]    The treatment team has appreciated the opportunity to work with you.     Please take care and make your recovery a daily recovery.   If you have any questions or concerns our unit number is [phone].  Thank you.         Pending Results     No orders found from 2017 to 2017.            Statement of Approval     Ordered          17 0940  I have reviewed and agree with all the recommendations and orders detailed in this document.  EFFECTIVE NOW     Approved and electronically signed by:  Gutierrez Yates MD             Admission Information     Date & Time Provider Department Dept. Phone    2017 Gutierrez Yates MD [facility] 10[facility] [phone]      Your Vitals Were     Blood Pressure Pulse Temperature Respirations Height Weight    168/104 112 98.2  F (36.8  C) (Oral) 16 1.829 m (6') 79.4 kg (175 lb)    BMI (Body Mass Index)                   23.73 kg/m2           MyChart Information     [facility] lets you send messages to your doctor, view your test results, renew your prescriptions, schedule appointments and more. To sign up, go to [email or web address] . Click on \"Log in\" on the left side of the screen, which will take you to the Welcome page. Then click on \"Sign up Now\" on the right side of the page.     You will be asked to enter the access code listed below, as well as some personal information. Please follow the directions to create your username and password.     Your access code is: [email or web address]  Expires: 2017  4:13 PM     Your access code will  in 90 days. If you need help or a new code, please call your [place] clinic or [phone].        Care EveryWhere ID     This is your Care EveryWhere ID. This could be used by other organizations to access your [place] medical " records  [id number]        Equal Access to Services     [facility] HospitalALFREDA : Hadii jodie ku axel Sonazaninali, waaxda luqadaha, qaybta kaalmada georginafran, waxelissa philin hayaaguilherme ericksonjodie pal rima montes. So [facility] [phone].    ATENCIÓN: Si habla español, tiene a holbrook disposición servicios gratuitos de asistencia lingüística. Gema al [phone].    We comply with applicable federal civil rights laws and [place] laws. We do not discriminate on the basis of race, color, national origin, age, disability sex, sexual orientation or gender identity.               Review of your medicines      START taking        Dose / Directions    melatonin 3 MG tablet        Dose:  3-6 mg   Take 1-2 tablets (3-6 mg) by mouth nightly as needed for sleep   Quantity:  60 tablet   Refills:  1       QUEtiapine 50 MG tablet   Commonly known as:  SEROquel        Dose:   mg   Take 1-2 tablets ( mg) by mouth nightly as needed (insomnia)   Quantity:  60 tablet   Refills:  1       sertraline 50 MG tablet   Commonly known as:  ZOLOFT        Dose:  50 mg   Take 1 tablet (50 mg) by mouth daily   Quantity:  30 tablet   Refills:  1       traZODone 100 MG tablet   Commonly known as:  DESYREL        Dose:  100 mg   Take 1 tablet (100 mg) by mouth nightly as needed for sleep   Quantity:  60 tablet   Refills:  1            Where to get your medicines      These medications were sent to [place], [place] - [address]  [address] [address]     Phone:  [phone]     melatonin 3 MG tablet    QUEtiapine 50 MG tablet    sertraline 50 MG tablet    traZODone 100 MG tablet                Protect others around you: Learn how to safely use, store and throw away your medicines at www.disposemymeds.org.             Medication List: This is a list of all your medications and when to take them. Check marks below indicate your daily home schedule. Keep this list as a reference.      Medications            Morning Afternoon Evening Bedtime As Needed    melatonin 3 MG tablet   Take 1-2 tablets (3-6 mg) by mouth nightly as needed for sleep   Last time this was given:  6 mg on 8/24/2017  9:49 PM                                QUEtiapine 50 MG tablet   Commonly known as:  SEROquel   Take 1-2 tablets ( mg) by mouth nightly as needed (insomnia)   Last time this was given:  100 mg on 8/24/2017  9:48 PM                                sertraline 50 MG tablet   Commonly known as:  ZOLOFT   Take 1 tablet (50 mg) by mouth daily   Last time this was given:  50 mg on 8/25/2017  9:43 AM                                traZODone 100 MG tablet   Commonly known as:  DESYREL   Take 1 tablet (100 mg) by mouth nightly as needed for sleep   Last time this was given:  100 mg on 8/24/2017  9:49 PM

## 2017-08-07 NOTE — ED NOTES
Patient placed in brown scrubs. Patient compliant with getting into scrubs and giving security his belongings.

## 2017-08-07 NOTE — ED PROVIDER NOTES
"  History     Chief Complaint:  Suicidal    HPI   Sidney Varghese is a 52 year old male who presents by EMS with suicidal thoughts.  [facility] received a call from patient's counselor who received a phone call the patient stating he was going to drink lead additive and dug a hole in the backyard.  Patient was recently in alcohol treatment at [facility] in [facility] and relapsed after leaving.  He admits to drinking \"gallons\" of hard liquor everyday.  He won't state what kind of liquor.  He also states he didn't mean anything he said.  RN tells me that patient received a new diagnosis of lung cancer few months ago but hasn't had any treatments yet.  Patient voiced no suicidality currently and demands to be released.  He refuses meds and IV placement and only allows blood draw with butterfly.    Allergies:  No Known Drug Allergies    Medications:    Medications reviewed. No current medications.      Past Medical History:    Cancer  Depressive disorder  Scoliosis  Substance abuse     Past Surgical History:    C4-6 cervical fusion  Orthopedic surgery     Family History:    History reviewed. No pertinent family history.     Social History:  Smoking Status: Current Every Day Smoker -- 0.50 Packs Per Day -- Cigarettes  Alcohol Use: Positive  Marital Status:  Single [1]     Review of Systems   Psychiatric/Behavioral: Positive for suicidal ideas.   All other systems reviewed and are negative.        Physical Exam   Vitals:  Patient Vitals for the past 24 hrs:   BP Temp Temp src Heart Rate SpO2 Weight   08/07/17 1825 - - - - - 81 kg (178 lb 9.2 oz)   08/07/17 1358 (!) 169/115 98.3  F (36.8  C) Temporal 111 97 % -      Physical Exam   Constitutional: He appears well-developed and well-nourished.   HENT:   Right Ear: External ear normal.   Left Ear: External ear normal.   Mouth/Throat: Oropharynx is clear and moist. No oropharyngeal exudate.   Eyes: Conjunctivae are normal. Pupils are equal, round, and reactive to " light. No scleral icterus.   Cardiovascular: Normal rate, regular rhythm, normal heart sounds and intact distal pulses.  Exam reveals no gallop and no friction rub.    No murmur heard.  Pulmonary/Chest: Effort normal and breath sounds normal. No respiratory distress. He has no wheezes. He has no rales.   Abdominal: He exhibits no mass.   Musculoskeletal: Normal range of motion. He exhibits no edema.   Neurological: He is alert. No cranial nerve deficit.   Skin: Skin is warm and dry. No rash noted.   Psychiatric:   Denies current SI but not cooperative with questioning  Angry appearing      Emergency Department Course   Laboratory:      CBC: WBC 4.3, HGB 14.0,   CMP: Calc 8. (L), AST 60 (H), ow WNL (Creatinine 0.68)  Ethanol: 0.16 (H)   Salicylate level: 3  Acetaminophen level: <2    Interventions:  Nicoderm patch    Emergency Department Course:  Nursing notes and vitals reviewed.   I performed an exam of the patient as documented above.     The work up above was undertaken.     The patient received the intervention(s) above.     Findings and plan explained to the Patient. Patient discharged home with instructions regarding supportive care, medications, and reasons to return. The importance of close follow-up was reviewed.   Impression & Plan    Medical Decision Making:  Sidney Varghese is a 52 year old male who presents by EMS for suicidal ideation.  Patient initially denied having any suicidal thoughts.  We tracked down the person that he called at [facility].  I was informed that he talked to a nurse, who then reported the conversation to her supervisor, who then called 911 to go check on him.  He refused to have IV placed but did agree to have blood drawn and change into scrubs.  He is still resistant to any treatment but talked to DEC.  DEC is concerned about his depression and suicidality and recommended inpatient admission.  Patient was clinically sober upon DEC evaluation.  I did speak with  psychiatrist on call from [place] as they had requested repeat DEC evaluation after repeat alcohol level falls below 0.08.  I felt that is not productive and would not change our plan.  Patient will likely state he is not suicidal to leave the ED.  The psychiatrist did agree to admit this patient due to the high risk nature of his symptoms.  Patient is transferred to [place] on 72 hour hold.    Diagnosis:    ICD-10-CM    1. Suicidal ideation R45.851    2. Depression, unspecified depression type F32.9         Disposition:     Transferred to [facility]    Scribe Disclosure:  I, Nilson Blas, am serving as a scribe at 8:29 PM on 8/7/2017 to document services personally performed by Dr. Bryant, based on my observations and the provider's statements to me    [facility] EMERGENCY DEPARTMENT       Alvaro Bryant MD  08/07/17 8656

## 2017-08-07 NOTE — ED NOTES
"EMS arrived with patient after after receiving a call from the pt's counselor. EMS were told by Pt's counselor that the pt requested lead additive and has a hole dug in his backyard already. Pt denies suicidal thoughts saying, \"I have to tell you the right answers so that I can get out of here.\" Pt is agitated, saying \"I am going to leave right out that exit right there, you need at least 5 security guards to stop me.\" Pt has been put on an YASMEEN. ABCDs stable.  "

## 2017-08-07 NOTE — IP AVS SNAPSHOT
[address] [address]    Phone:  [phone]                                       After Visit Summary   8/7/2017    Sidney Varghese    MRN: [id number]           After Visit Summary Signature Page     I have received my discharge instructions, and my questions have been answered. I have discussed any challenges I see with this plan with the nurse or doctor.    ..........................................................................................................................................  Patient/Patient Representative Signature      ..........................................................................................................................................  Patient Representative Print Name and Relationship to Patient    ..................................................               ................................................  Date                                            Time    ..........................................................................................................................................  Reviewed by Signature/Title    ...................................................              ..............................................  Date                                                            Time

## 2017-08-07 NOTE — ED NOTES
Patient given glass of ice water. In room, sitting in chair at bedside. Security monitoring outside of door and on video monitor.

## 2017-08-07 NOTE — ED NOTES
Bed: ED11  Expected date: 8/7/17  Expected time: 1:34 PM  Means of arrival: Ambulance  Comments:  51 yo M suicidal

## 2017-08-07 NOTE — ED NOTES
"Patient placed on YASMEEN by RN due to patient stating he was going to leave. Patient stated \"I have an intact brain, and 2 feet\". MD informed of YASMEEN placement.   "

## 2017-08-08 PROCEDURE — 99207 ZZC CONSULT E&M CHANGED TO INITIAL LEVEL: CPT | Performed by: NURSE PRACTITIONER

## 2017-08-08 PROCEDURE — 99222 1ST HOSP IP/OBS MODERATE 55: CPT | Mod: AI | Performed by: PSYCHIATRY & NEUROLOGY

## 2017-08-08 PROCEDURE — 12400001 ZZH R&B MH UMMC

## 2017-08-08 PROCEDURE — 25000132 ZZH RX MED GY IP 250 OP 250 PS 637: Performed by: PSYCHIATRY & NEUROLOGY

## 2017-08-08 PROCEDURE — 99221 1ST HOSP IP/OBS SF/LOW 40: CPT | Performed by: NURSE PRACTITIONER

## 2017-08-08 PROCEDURE — 90853 GROUP PSYCHOTHERAPY: CPT

## 2017-08-08 RX ADMIN — FOLIC ACID 1 MG: 1 TABLET ORAL at 09:07

## 2017-08-08 RX ADMIN — Medication 100 MG: at 09:07

## 2017-08-08 RX ADMIN — NICOTINE 1 PATCH: 14 PATCH, EXTENDED RELEASE TRANSDERMAL at 09:07

## 2017-08-08 RX ADMIN — LORAZEPAM 1 MG: 1 TABLET ORAL at 09:05

## 2017-08-08 RX ADMIN — MULTIPLE VITAMINS W/ MINERALS TAB 1 TABLET: TAB at 09:07

## 2017-08-08 ASSESSMENT — ACTIVITIES OF DAILY LIVING (ADL)
GROOMING: INDEPENDENT
ORAL_HYGIENE: INDEPENDENT
LAUNDRY: WITH SUPERVISION
DRESS: STREET CLOTHES

## 2017-08-08 NOTE — PROGRESS NOTES
MSSA assessment completed at 0025 with a score of 8. Patient is irritable and minimally cooperative with assessment. Highly encouraged to take some medicine for a safe nights sleep and patient becomes a bit belligerent with this request, stating he has never gone into w/d and he isn't taking any medicine and he is leaving first thing in the morning. Will continue to assess as much as patient will allow.

## 2017-08-08 NOTE — H&P
"DATE OF ADMISSION:  08/07/2017.  Patient seen on 08/08/2017.      CHIEF COMPLAINT:  I want to be discharged.      HISTORY OF PRESENT ILLNESS:  Sidney Varghese is a 52-year-old white male with a history of depression and alcohol use disorder who was admitted after calling his substance abuse counselor and saying that he was going to drink a lead additive and bury himself in a hole in the ground.  The patient denies that he was having any suicidal thoughts.  Denies any thoughts of suicide now.  He does say he has been a little bit depressed.  Our records indicate there were guns in the home that he lives with his parents, but he says that he does not have access to those.  The guns are all locked up.  He says that he recently finished treatment at [facility], did a 28-day stay there and was sober for 2 weeks.  He is unsure why he relapsed.  Does not want to be on naltrexone or Antabuse.  He does not feel that he needs an antidepressant.  Says his primary issue is with sleep.  Discussed options including Remeron, which he thinks he has at home and doxepin.  He said that nothing works better than alcohol.  Does endorse auditory and visual hallucinations, says that their house is haunted by a ghost of a man from he said the 50s.  He has a pinstriped suit and a top hat.  He said his mother has seen the ghost as well, but he said that he is safe at home and wants to return home now.  Is agreeable to have this provider speak to his parents.  I did speak to his father Contreras at [phone].  He says the patient had been drinking \"an awful lot\" and says that the patient does not communicate with very many people.  Said that he did speak to his  and Contreras and his wife feel that the patient was scared that he would go to longterm because he recently failed a urine drug screen and knows he would fail another one.  They are unsure if this last CD treatment was court ordered.  Does say that the guns are not " all locked up.  Says that as far he knows he patient has never attempted suicide but has talked about it and does believe he has some legal issues where he could end up in [facility].      PAST PSYCHIATRIC HISTORY:  The patient has carries a diagnosis of major depressive disorder, generalized anxiety disorder.  Never attempted suicide, never been hospitalized for psychiatric reasons.  Has had a therapist in the past but none currently.  Not on any psychiatric medications, although may have some Remeron at home.      ALLERGIES:  No known drug allergies.      PAST MEDICAL HISTORY:  The patient has recently been diagnosed with lung cancer, but has not started treatment.      REVIEW OF SYSTEMS:  A 10-point systems reviewed and all were negative except those mentioned in the HPI.      FAMILY PSYCHIATRIC HISTORY:  The patient's parents both have alcohol use disorder, has a great-grandmother who committed suicide and an uncle who at least attempted suicide by shooting himself.      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      SUBSTANCE HISTORY:  The patient's substance of choice is alcohol, has been through at least 4 CD treatments.  Denies any history of seizures or DTs.  Does have a remote history of marijuana and opiate use disorder.  Smokes a half pack per day.      PHYSICAL EXAMINATION:  Please see the physical exam from Dr. Bryant on 08/07/2017 which I have reviewed.  Also discussed the case with Dr. Bryant who was quite concerned about the patient's risk for suicide if he were to discharge home.      VITAL SIGNS:  Blood pressure 173/99, pulse 134, respirations 16, temperature 97.1.      MENTAL STATUS EXAMINATION:  MERCED:  The patient is a 52-year-old white male, irritable, guarded, fairly uncooperative.  Fair eye contact.  Speech:  Regular rate, rhythm, volume and tone.  Mood is a little bit depressed.  Affect is blunted.   Thought process is illogical.  Thought content:  Denies suicidal or homicidal ideation.  Does endorse recent auditory and visual hallucinations of hearing things and seeing a ghost in the home.  No loosening of associations noted.  Sensorium is clear.  Cognition:  Oriented x3.  Recent and remote memory fair.  Attention and concentration fair.  Language:  No deficits noted.  Fund of knowledge appropriate.  Muscle strength and tone, no deficits noted.  Gait and station, no deficits noted.  Insight and judgment are both limited at this time.      DIAGNOSES:   Axis I:  Major depressive disorder, recurrent, severe with the possibility of psychosis.   Alcohol use disorder, severe dependence.   Nicotine use disorder.   Axis II:  Deferred.   Axis III:  Recent diagnosis of lung cancer.      PLAN:   1.  The patient was admitted to station 10 under a 72-hour hold.  Encouraged to engage in groups and with staff on unit.   2.  The patient will be detoxified with [facility] protocol and Ativan.   3.  If the patient is unwilling to sign in voluntarily, then we will look at filing a petition for commitment or speak to his current  to determine his legal status.  The patient is at high risk for relapse and suicidality, given the family history of substance use, being a white male and recent lung cancer diagnosis.   4.  The patient expressed a willingness to stay voluntarily in front of two witnesses. Will have him sign in. If he asks to leave or signs a 12-hour intent to leave, please page Dr. Yates.         SANCHO YATES MD             D: 2017 08:49   T: 2017 09:19   MT: CLARITA      Name:     JEN MADRIGAL   MRN:      [id number]        Account:      [id number]   :      1965           Admitted:     [id number]      Document: [id number]

## 2017-08-08 NOTE — PROGRESS NOTES
Signed in voluntary, attended select unit programming, denied mental health concerns.     08/08/17 1453   Behavioral Health   Hallucinations denies / not responding to hallucinations   Thinking distractable;poor concentration   Orientation person: oriented;place: oriented   Memory baseline memory   Eye Contact at examiner   Affect (narrow)   Mood mood is calm   Physical Appearance/Attire untidy   Suicidality (denies)   Elopement (no risk evident)   Activity (present in milieu)   Speech clear   Psychomotor / Gait balanced;steady   Psycho Education   Type of Intervention 1:1 intervention   Response participates with encouragement   Hours 1   Treatment Detail stratagies   Activities of Daily Living   Hygiene/Grooming independent   Oral Hygiene independent   Dress street clothes   Laundry with supervision   Room Organization independent   Activity   Activity Level of Assistance independent

## 2017-08-08 NOTE — PLAN OF CARE
Problem: General Plan of Care (Inpatient Behavioral)  Goal: Team Discussion  Team Plan:   BEHAVIORAL TEAM DISCUSSION     Participants: Erna SIMEON; Gutierrez Yates MD; Shoshana Chamberlain RN  Progress:Some improvement in mentation.  Continued Stay Criteria/Rationale: Patient is newly admitted with depression and suicidal ideation in the context of alcohol intoxication.  Medical/Physical: withdrawal protocol; lung lesions to be further investigated.  Precautions:   Behavioral Orders   Procedures     Code 1 - Restrict to Unit     Elopement precautions     Routine Programming       As clinically indicated     Status 15       Every 15 minutes.     Suicide precautions     Withdrawal precautions     [facility]; consider medication initiation; coordinate care with ; referrals for mental health and CD treatment as appropriate.  Rationale for change in precautions or plan: initial plan.

## 2017-08-08 NOTE — PLAN OF CARE
Problem: General Plan of Care (Inpatient Behavioral)  Goal: Individualization/Patient Specific Goal (IP Behavioral)  The patient and/or their representative will achieve their patient-specific goals related to the plan of care.    The patient-specific goals include:Illness Management Recovery model: Objectives    Patient will identify reason(s) for hospitalization from their perspective.  Patient will identify a minimum of three goals for discharge.  Patient will identify a minimum of three triggers that may increase their symptoms.  Patient will identify a minimum of three coping skills they can do to stay well.   Patient will identify their support system to demonstrate readiness for discharge.      Patient completed Personal Plan of Care as follows:  Reasons for admission  1. Feeling depressed  2. Sleep deprived     Goals for discharge  1. Medication for anxiety, depression and sleep.  2. Return to outpatient programs and AA.

## 2017-08-08 NOTE — PLAN OF CARE
Problem: Depressive Symptoms  Goal: Depressive Symptoms  Signs and symptoms of listed problems will be absent or manageable.  Outcome: No Change  Admitted 51 yo male on 72 hour hold from [facility] ED. Pt suicidal with plan to shoot himself. Lives with parents. Pt's father has pistol at home. Hx of ETOH abuse. Went through CD treatment at [facility] in July. Pt Muriel today, telling staff member he was thinking of suicide via gunshot. Pt has been drinking 1 pint to a quart daily. BAL 0.16. Denies any hx of withdrawal, including seizures. Became agitated in ED when told on a hold. Pt repeatedly stated he would bolt. Given Geodon 20 mg IM. Placed in restraints for transport. On unit, pt minimally cooperative. Angry/ irritable. Refused to answer questions. Given tour of unit, pt handouts, and late tray/cold sandwich. Medical hx includes recently dx of lung cancer. Hx ankle fx/surgery and neck surgery.     Pt placed on Suicide, Elopement, and Withdrawal Precautions. MSSA initiated.      MSSA score at 2200 9. Pt refused offered Ativan. At 2140 T 96.8, P 112, /110. Pt extremely upset/ agitated at time VS taken. Pt refused VS recheck when calmer.
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
## 2017-08-08 NOTE — PROGRESS NOTES
Patient does allow RN to retake vitals and reassess MSSA. Patient MSSA score is 13. Vitals 97.1 134 16. /99. Patient is angry and irritable and once again minimally cooperative. Refuses treatment for withdrawal with medications, even with persistent coaxing and recommendation.

## 2017-08-08 NOTE — ED NOTES
Pt making statements that 'I'm just going to leave'.  EMS applied restraints and IM geodon given prior to departure.

## 2017-08-08 NOTE — CONSULTS
Internal Medicine Consult - Initial Visit       Sidney Varghese MRN# [id number]   YOB: 1965 Age: 52 year old   Date of Admission: 8/7/2017  PCP: [facility], [place]  Date of Service: 8/8/2017    Referring Provider: Gutierrez Yates MD  Reason for Consult: Medical co-management of medical issues         Assessment and Recommendations:   Sidney Varghese is a 52 year old male with a history of alcohol dependency and depression admitted for suicidal ideation.      # Depression, SI - Management per Psychiatry    # Alcohol dependency - Patient reports long term history of ETOH use with multiple attempts at treatment.  No hx of withdrawal seizures.  Noted to be hypertensive to 170-180s systolic today. LFTs wnl.   - Continue MSSA protocol  - Continue withdrawal precautions   - Hydralazine PRN for SBP > 160    # Questionable dx of lung CA - Reviewed records with patient.  Most recent CT C/A/P on 4/23/2015 at [facility] shows 2-3mm pulmonary nodule in the R lung apex.  Pt most recently had CXR on 6/15/17 with no acute findings, and no comment on any nodules.  Discussed with patient and advised him to follow up with his PCP for referral for further imaging.   - Recommend follow up with PCP for OP referral for CT chest     Medicine will sign off, no further recommendations at this time.  Please feel free to reconsult if patient's symptoms worsen or if new problems arise.  Thank you for this consult.    Frieda Doshi CNP  Hospitalist Service   Pager: [phone]       Reason for Visit:   Multiple medical concerns, alcohol dependency          History of Present Illness:   History is obtained from the patient and medical record.     This patient is a 52 year old male with a history of alcohol dependency and depression admitted for suicidal ideation.     Internal Medicine service was asked to see patient for management of medical conditions and alcohol dependency.  Gaurav tells me that has never been diagnosed  "with hypertension, but tends to \"run high\" at his baseline.  He states that he drinks anywhere from \"a pint per day\" to \"maybe a quart\" on the weekends or socially.  He reports no medications prior to admission.  When questioned about lung cancer diagnosis, he reports that he was not formally diagnosed, but rather had been told by a provider at an OSH that there was \"something in my lung\".  He denies any recent weight changes, night sweats, fevers, appetite changes, or generalized lower back pain or bone pain.  He reports vague left sided abdominal pain that he feels \"is always there\" but unable to characterize.  He states that this has been ongoing for several months, but has not interfered with his oral intake, and he has not experienced any vomiting.  He denies chest pain, dyspnea, hemoptysis, nausea, vomiting, and abdominal pain.            Review of Systems:   A 10 point ROS was performed and negative unless otherwise noted in HPI.           Past Medical History:   Reviewed and updated in Epic.  Past Medical History:   Diagnosis Date     Cancer (H)      Depressive disorder      Scoliosis      Substance abuse              Past Surgical History:   Reviewed and updated in Epic.  Past Surgical History:   Procedure Laterality Date     C4-6 cervical fusion       ORTHOPEDIC SURGERY               Social History:   Reviewed and updated in [employer].  Social History     Social History     Marital status: Single     Spouse name: N/A     Number of children: N/A     Years of education: N/A     Occupational History     Not on file.     Social History Main Topics     Smoking status: Current Every Day Smoker     Packs/day: 0.50     Types: Cigarettes     Smokeless tobacco: Not on file     Alcohol use Yes     Drug use: Not on file     Sexual activity: Not on file     Other Topics Concern     Not on file     Social History Narrative              Family History:   Reviewed and updated in Epic.  No family history on file.          " Allergies:   No Known Allergies          Medications:     Current Facility-Administered Medications   Medication     hydrOXYzine (ATARAX) tablet 25-50 mg     acetaminophen (TYLENOL) tablet 650 mg     alum & mag hydroxide-simethicone (MYLANTA ES/MAALOX  ES) suspension 30 mL     magnesium hydroxide (MILK OF MAGNESIA) suspension 30 mL     bisacodyl (DULCOLAX) Suppository 10 mg     traZODone (DESYREL) tablet 50 mg     OLANZapine (zyPREXA) tablet 10 mg    Or     OLANZapine (zyPREXA) injection 10 mg     nicotine Patch in Place     nicotine patch REMOVAL     nicotine (NICODERM CQ) 14 MG/24HR 24 hr patch 1 patch     LORazepam (ATIVAN) tablet 1-4 mg     atenolol (TENORMIN) tablet 50 mg     thiamine tablet 100 mg     folic acid (FOLVITE) tablet 1 mg     multivitamin, therapeutic with minerals (THERA-VIT-M) tablet 1 tablet            Physical Exam:   Blood pressure (!) 173/99, pulse 134, temperature 97.1  F (36.2  C), temperature source Oral, resp. rate 16, height 1.829 m (6'), weight 75.3 kg (166 lb).  Body mass index is 22.51 kg/(m^2).     GENERAL: Alert and oriented x 3. Thin body habitus, appears slightly older than stated age.  No acute distress.    HEENT: Normocephalic, atraumatic. Anicteric sclera. Mucous membranes moist.   CV: RRR. S1, S2. No murmurs appreciated.   RESPIRATORY: Effort normal on room air. Lungs CTAB with no wheezing, rales, rhonchi.   GI: Abdomen soft and non distended, bowel sounds present x all 4 quadrants. No tenderness, rebound, guarding.   NEUROLOGICAL: No focal deficits. Follows commands.  strength intact.     MUSCULOSKELETAL: No joint swelling or tenderness. Moves all extremities.   EXTREMITIES: No gross deformities. No peripheral edema. Intact bilateral pedal pulses.   SKIN: Grossly warm, dry, and intact. No jaundice. No rashes.           Data:   I personally reviewed the following studies:    ROUTINE IP LABS (Last four results)  CMP   Recent Labs  Lab 08/07/17  1456      POTASSIUM 3.7    CHLORIDE 105   CO2 27   ANIONGAP 10   GLC 87   BUN 10   CR 0.68   CLEO 8.4*   PROTTOTAL 7.6   ALBUMIN 3.8   BILITOTAL 0.4   ALKPHOS 106   AST 60*   ALT 39     CBC   Recent Labs  Lab 08/07/17  1456   WBC 4.3   RBC 4.25*   HGB 14.0   HCT 41.5   MCV 98   MCH 32.9   MCHC 33.7   RDW 11.8        INR No lab results found in last 7 days.        Unresulted Labs Ordered in the Past 30 Days of this Admission     No orders found for last 61 day(s).

## 2017-08-08 NOTE — PROGRESS NOTES
08/07/17 2141   Patient Belongings   Did you bring any home meds/supplements to the hospital?  No   Patient Belongings clothing;cell phone/electronics;other (see comments)   Disposition of Belongings SECURITY   Belongings Search Yes   Clothing Search Yes   Second Staff TENZIN   General Info Comment SECURITY ($408.85) LOCKER (BOOTS / CELL PHONE / JACKET / CIGS / SUN GLASSES)   ADMISSION:  I am responsible for any personal items that are not sent to the safe or pharmacy. [facility] is not responsible for loss, theft or damage of any property in my possession.    Patient Signature _____________________ Date/Time _____________________    Staff Signature _______________________ Date/Time _____________________    2nd Staff person, if patient is unable/unwilling to sign  ___________________________________ Date/Time _____________________  DISCHARGE:  All personal items have been returned to me.    Patient Signature _____________________ Date/Time _____________________    Staff Signature _______________________ Date/Time _____________________

## 2017-08-09 PROCEDURE — 90853 GROUP PSYCHOTHERAPY: CPT

## 2017-08-09 PROCEDURE — 25000132 ZZH RX MED GY IP 250 OP 250 PS 637: Performed by: NURSE PRACTITIONER

## 2017-08-09 PROCEDURE — 97150 GROUP THERAPEUTIC PROCEDURES: CPT | Mod: GO

## 2017-08-09 PROCEDURE — 12400007 ZZH R&B MH INTERMEDIATE UMMC

## 2017-08-09 PROCEDURE — 25000132 ZZH RX MED GY IP 250 OP 250 PS 637: Performed by: PSYCHIATRY & NEUROLOGY

## 2017-08-09 RX ADMIN — Medication 12.5 MG: at 00:29

## 2017-08-09 RX ADMIN — Medication 12.5 MG: at 13:12

## 2017-08-09 RX ADMIN — Medication 100 MG: at 09:04

## 2017-08-09 RX ADMIN — FOLIC ACID 1 MG: 1 TABLET ORAL at 09:04

## 2017-08-09 RX ADMIN — NICOTINE 1 PATCH: 14 PATCH, EXTENDED RELEASE TRANSDERMAL at 09:04

## 2017-08-09 RX ADMIN — MULTIPLE VITAMINS W/ MINERALS TAB 1 TABLET: TAB at 09:04

## 2017-08-09 ASSESSMENT — ACTIVITIES OF DAILY LIVING (ADL)
GROOMING: INDEPENDENT
LAUNDRY: WITH SUPERVISION
DRESS: STREET CLOTHES
DRESS: INDEPENDENT
ORAL_HYGIENE: INDEPENDENT
ORAL_HYGIENE: INDEPENDENT
GROOMING: SHOWER;INDEPENDENT

## 2017-08-09 NOTE — PROGRESS NOTES
08/08/17 8510   Behavioral Health   Hallucinations denies / not responding to hallucinations   Thinking distractable;poor concentration   Orientation place: oriented;person: oriented   Memory baseline memory   Insight denial of illness;poor   Judgement impaired   Eye Contact at examiner   Affect irritable   Mood irritable   Physical Appearance/Attire untidy   Hygiene other (see comment);neglected grooming - unclean body, hair, teeth   Suicidality other (see comments)  (denies currently)   Self Injury other (see comment)  (denies currently)   Elopement (no value)   Activity (no value)   Speech coherent;clear   Medication Sensitivity no observed side effects;no stated side effects   Psychomotor / Gait balanced;steady     Pt visible in milieu. Mood and affect was irritable. Pt currently denies SI/SIB. No signs or reports of psychotic symptoms. No behavioral issues this shift.

## 2017-08-09 NOTE — PROGRESS NOTES
"Attended 0.5 of 3.0 OT groups offered. Was taken out of earlier groups for consults. Disheveled appearance and poor hygiene. Pt was given and completed a written self assessment. OT purpose was explained with a value of having involvement in tx plan, and provided options to meet self identified goals. Will assess further in the areas of organization, problem solving, and concentration. Discussed his chemical use and the interference it has in his daily living. Pt. denies his chemical use effects anyone other than himself. Noted he had stopped his anti-anxiety meds, after 2 weeks, \"because they were not working\" . Now informed it takes up to 6 weeks for meds to be at optimum levels. Will continue to discuss healthy coping skills, self management skills/activities and community supports.    "

## 2017-08-09 NOTE — PLAN OF CARE
Problem: Depressive Symptoms  Goal: Depressive Symptoms  Signs and symptoms of listed problems will be absent or manageable.   Outcome: No Change  Patient now back on 72 hour hold having denied 12 hour intent to leave.  Per MD, patient still on SI and withdrawal precautions.  This was explained to patient and also got a copy of hold.  Patient ok with this and willing to talk to doctor tomorrow.  MSSA 3 and 3 this shift, received prn hydralazine for high b/p per order.  Patient socialized and watched T.V with peers.  Denies current SI/SIB, rated depression at 5, anxiety at 0.  No aggressive behavior noted, will continue to monitor closely.

## 2017-08-09 NOTE — PROGRESS NOTES
RN checked in with pt to take vitals and assess MSSA as pt was still awake in room. Pt calm and cooperative, denies all s/s of withdrawal. MSSA 4. Pt reading Patient Bill of Rights and requested 12 Hour Intent to Discharge. Pt states he had talked to several other patients on the unit and does not understand why some stay and some discharge. Pt states he feels he is ready to return home. 12 Hour Intent form given to pt who completed form and returned to RN (8/9/17 @ 00:15). Pt accepting of PRN Hydralazine for his blood pressure, however states that he has always had high blood pressure but is willing to try the medication.

## 2017-08-10 PROCEDURE — 97150 GROUP THERAPEUTIC PROCEDURES: CPT | Mod: GO

## 2017-08-10 PROCEDURE — 25000132 ZZH RX MED GY IP 250 OP 250 PS 637: Performed by: PSYCHIATRY & NEUROLOGY

## 2017-08-10 PROCEDURE — 90853 GROUP PSYCHOTHERAPY: CPT

## 2017-08-10 PROCEDURE — 99232 SBSQ HOSP IP/OBS MODERATE 35: CPT | Performed by: PSYCHIATRY & NEUROLOGY

## 2017-08-10 PROCEDURE — 12400007 ZZH R&B MH INTERMEDIATE UMMC

## 2017-08-10 RX ADMIN — FOLIC ACID 1 MG: 1 TABLET ORAL at 09:12

## 2017-08-10 RX ADMIN — MULTIPLE VITAMINS W/ MINERALS TAB 1 TABLET: TAB at 09:11

## 2017-08-10 RX ADMIN — TRAZODONE HYDROCHLORIDE 50 MG: 50 TABLET ORAL at 22:13

## 2017-08-10 RX ADMIN — NICOTINE 1 PATCH: 14 PATCH, EXTENDED RELEASE TRANSDERMAL at 08:23

## 2017-08-10 RX ADMIN — Medication 100 MG: at 09:12

## 2017-08-10 ASSESSMENT — ACTIVITIES OF DAILY LIVING (ADL)
LAUNDRY: WITH SUPERVISION
LAUNDRY: WITH SUPERVISION
ORAL_HYGIENE: INDEPENDENT
DRESS: INDEPENDENT
DRESS: STREET CLOTHES
ORAL_HYGIENE: INDEPENDENT
GROOMING: INDEPENDENT
GROOMING: INDEPENDENT

## 2017-08-10 NOTE — PROGRESS NOTES
"Re: Petition      [facility] pre petition met with patient and then met with CTC to let us know that the patient resides in [facility] despite ID and EMR's.  Writer met with patient to obtain updated address, patient initially would not provide information. Also stated if he was sent to treatment again he would \"just walk out\". Writer contacted MD indicating we need a new examiner statement. Contacted [facility] to initiate commitment.    Update:Writer confirmed with [facility] intake that all documentation had been received.      Flakita De La Cruz, LPCC, LADC    "

## 2017-08-10 NOTE — PROGRESS NOTES
"Sidney was visible in milieu this evening, social with peers. Pt states he would like to be discharged so he can return to work. Pt states he knows what he needs to do to remain sober and intends to do so. Pt states that past comments he has made had been misinterpreted by others who believed he may have been suicidal. Pt states this was not the case. Pt was irritable at times, making negative comments about other patients who he stated \"deserved to be here\" and calling them \"whackos\". Pt also stating \"if I don't get discharged, maybe I would really like living here and then I can be here for a few months.\" Denied SI/SIB, depression and anxiety.      08/09/17 2707   Behavioral Health   Hallucinations denies / not responding to hallucinations   Thinking distractable   Orientation person: oriented;place: oriented;date: oriented;time: oriented   Memory baseline memory   Insight admits / accepts   Judgement impaired   Eye Contact at examiner   Affect tense;full range affect   Mood irritable   Physical Appearance/Attire attire appropriate to age and situation   Hygiene well groomed   Suicidality other (see comments)  (denies)   Self Injury other (see comment)  (denies)   Elopement (pt states he would like to be discharged)   Activity other (see comment)  (visible in milieu, social with peers)   Speech clear;coherent   Medication Sensitivity no stated side effects;no observed side effects   Psychomotor / Gait balanced;steady   Psycho Education   Type of Intervention 1:1 intervention   Response participates, initiates socially appropriate   Hours 0.5   Activities of Daily Living   Hygiene/Grooming shower;independent   Oral Hygiene independent   Dress independent   Room Organization independent   Behavioral Health Interventions   Depression maintain safety precautions;monitor need to revise level of observation;maintain safe secure environment   Social and Therapeutic Interventions (Depression) encourage socialization with " peers;encourage effective boundaries with peers;encourage participation in therapeutic groups and milieu activities

## 2017-08-10 NOTE — PROGRESS NOTES
Re: Civil Commitment       Writer called [facility] to initiate commitment process.       Flakita De La Cruz, LPCC, LADC
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
## 2017-08-10 NOTE — PROGRESS NOTES
Attended 3 of 3 OT groups offered. Initiated all groups and actively participated in sleep health and goal setting discussions and activities. Identified numerous strategies to support sleep and discussed his active use of meditation and breathing techniques. Initiated 2nd group angry due to receiving news he would be staying longer. Denies need to be here and denies suicidal thoughts. Futuristic in his planning post d/c. Discussed his desire to leave his parents home and to travel /live in his vehicle. Verbalized his frustration with his siblings expecting him to attend to parents and farm because, he lives with them. Said he would  jobs along the way to support himself. Minimized chemical use. Did acknowledge his need to remain on meds to get optimum benefit from them. Bright, pleasant and social. Tends to use humor as a way to cover up his frustration and anger. Disheveled appearance.

## 2017-08-10 NOTE — PROGRESS NOTES
Initially refused vital signs, later agreed, attended unit programming, social with select peers.     08/10/17 1412   Behavioral Health   Hallucinations denies / not responding to hallucinations   Thinking distractable;poor concentration   Orientation person: oriented;place: oriented   Memory baseline memory   Eye Contact at examiner   Affect full range affect   Suicidality (none expressed)   Elopement Distress about legal situation (holds for mental health or criminal)   Activity (present in milieu)   Speech clear;coherent   Psychomotor / Gait balanced;steady   Psycho Education   Type of Intervention 1:1 intervention   Response participates, initiates socially appropriate   Hours 0.5   Treatment Detail (1:1 check-in)   Activities of Daily Living   Hygiene/Grooming independent   Oral Hygiene independent   Dress street clothes   Laundry with supervision   Room Organization independent   Activity   Activity Level of Assistance independent

## 2017-08-11 PROCEDURE — 97150 GROUP THERAPEUTIC PROCEDURES: CPT | Mod: GO

## 2017-08-11 PROCEDURE — 90853 GROUP PSYCHOTHERAPY: CPT

## 2017-08-11 PROCEDURE — 25000132 ZZH RX MED GY IP 250 OP 250 PS 637: Performed by: PSYCHIATRY & NEUROLOGY

## 2017-08-11 PROCEDURE — 12400007 ZZH R&B MH INTERMEDIATE UMMC

## 2017-08-11 PROCEDURE — 99232 SBSQ HOSP IP/OBS MODERATE 35: CPT | Performed by: PSYCHIATRY & NEUROLOGY

## 2017-08-11 RX ADMIN — NICOTINE 1 PATCH: 14 PATCH, EXTENDED RELEASE TRANSDERMAL at 08:58

## 2017-08-11 RX ADMIN — FOLIC ACID 1 MG: 1 TABLET ORAL at 08:58

## 2017-08-11 RX ADMIN — MULTIPLE VITAMINS W/ MINERALS TAB 1 TABLET: TAB at 08:58

## 2017-08-11 RX ADMIN — TRAZODONE HYDROCHLORIDE 50 MG: 50 TABLET ORAL at 22:02

## 2017-08-11 ASSESSMENT — ACTIVITIES OF DAILY LIVING (ADL)
COGNITION: 0 - NO COGNITION ISSUES REPORTED
RETIRED_COMMUNICATION: 0-->UNDERSTANDS/COMMUNICATES WITHOUT DIFFICULTY
TRANSFERRING: 0-->INDEPENDENT
SWALLOWING: 0-->SWALLOWS FOODS/LIQUIDS WITHOUT DIFFICULTY
DRESS: 0-->INDEPENDENT
BATHING: 0-->INDEPENDENT
AMBULATION: 0-->INDEPENDENT
FALL_HISTORY_WITHIN_LAST_SIX_MONTHS: NO
TOILETING: 0-->INDEPENDENT
RETIRED_EATING: 0-->INDEPENDENT

## 2017-08-11 NOTE — PLAN OF CARE
Problem: Depressive Symptoms  Goal: Depressive Symptoms  Signs and symptoms of listed problems will be absent or manageable.   Patient will participate in 50% of groups offered in Milieu  Patient will remain free from SIB on unit  Patient will verbalize absence of suicidal thoughts and self harm thoughts  Outcome: Therapy, progress toward functional goals as expected  Patient is socially appropriate this shift, respect boundaries, following direction and attending community meeting. Patient's mood is irritable partly because of a 72 HH. He is requesting to have a new doctor and was encouraged to make the request tomorrow. Patient wants to get discharged. He denies SI/SIB, hallucinations and racing thoughts.

## 2017-08-11 NOTE — PROGRESS NOTES
Re: Probation      Message left for  Roslyn Seymour ([phone]) with update regarding [facility] petitioning for MICD commitment.      Flakita De La Cruz, LPCC, LADC

## 2017-08-11 NOTE — PROGRESS NOTES
08/11/17 1448   Behavioral Health   Hallucinations denies / not responding to hallucinations   Thinking poor concentration   Orientation date: oriented;person: oriented;place: oriented;time: oriented   Memory baseline memory   Insight denial of illness;poor   Judgement impaired   Eye Contact at examiner   Affect blunted, flat;irritable   Mood irritable   Physical Appearance/Attire attire appropriate to age and situation   Hygiene other (see comment)  (adequate)   Suicidality other (see comments)  (denies currently)   Self Injury other (see comment)  (denies currently)   Elopement Distress about legal situation (holds for mental health or criminal);Statements about wanting to leave   Activity restless   Speech clear;coherent   Medication Sensitivity no stated side effects;no observed side effects   Psychomotor / Gait steady;balanced   Coping/Psychosocial   Verbalized Emotional State anger;frustration   Group Therapy Session   Group Attendance attended group session   Group Type community     Pt was visible in milieu. Mood and affect was irritable and tense. Pt endorses of thoughts of suicide as well as depression and anxiety symptoms. Pt expressed anger today over his meeting with the psychiatrist, noting he is upset with the decision that commitment will be pursued. Pt required redirection for swearing in the milieu. Otherwise, Pt was social and pleasant with his peers. No signs of psychotic symptoms. No significant behavioral issues this shift.

## 2017-08-11 NOTE — PROGRESS NOTES
Re: Civil Commitment      Screener is supporting MICD commitment.       Flakita De La Cruz, LPCC, LADC

## 2017-08-11 NOTE — PROGRESS NOTES
"[facility], [place]   Psychiatric Progress Note        Interim History:   The patient's care was discussed with the treatment team during the daily team meeting and/or staff's chart notes were reviewed.  Staff report patient has been irritable. Has requested a new provider.     The patient reports that he is \"great.\" Says that he is sleeping and eating well. Denies SI or HI. Denies AH or VH. Denies any withdrawal symptoms. Says that he has antidepressant medications at home. Doesn't remember what they are. Does not want to start anything here and says he will resume them at home. Would like a second opinion.          Medications:       nicotine   Transdermal Q8H     nicotine   Transdermal Daily     nicotine  1 patch Transdermal Daily     folic acid  1 mg Oral Daily     multivitamin, therapeutic with minerals  1 tablet Oral Daily          Allergies:   No Known Allergies       Labs:   No results found for this or any previous visit (from the past 24 hour(s)).       Psychiatric Examination:     BP (!) 153/97 (BP Location: Right arm)  Pulse 97  Temp 96.4  F (35.8  C) (Oral)  Resp 17  Ht 1.829 m (6')  Wt 76.7 kg (169 lb)  BMI 22.92 kg/m2  Weight is 169 lbs 0 oz  Body mass index is 22.92 kg/(m^2).  Orthostatic Vitals       Most Recent      Sitting Orthostatic /84 08/09 2047    Sitting Orthostatic Pulse (bpm) 78 08/09 2047    Standing Orthostatic /124 08/09 1209    Standing Orthostatic Pulse (bpm) 100 08/09 1209            Appearance: awake, alert and adequately groomed  Attitude:  less cooperative  Eye Contact:  fair  Mood:  good  Affect:  intensity is blunted  Speech:  clear, coherent  Psychomotor Behavior:  no evidence of tardive dyskinesia, dystonia, or tics  Throught Process:  illogical  Associations:  no loose associations  Thought Content:  no evidence of suicidal ideation or homicidal ideation and no evidence of psychotic thought  Insight:  limited  Judgement:  " limited  Oriented to:  time, person, and place  Attention Span and Concentration:  intact  Recent and Remote Memory:  fair         Precautions:     Behavioral Orders   Procedures     Code 1 - Restrict to Unit     Elopement precautions     Routine Programming     As clinically indicated     Status 15     Every 15 minutes.     Suicide precautions          DIagnoses:     Axis I:  Major depressive disorder, recurrent, severe with the possibility of psychosis.   Alcohol use disorder, severe dependence.   Nicotine use disorder.   Axis II:  Deferred.   Axis III:  Recent diagnosis of lung cancer.          Plan:     1) Patient placed on new 72-hour hold. Filed petition for MICD commitment.   2) Patient not willing to start antidepressant at this time.   3) Will talk to Dr. Landon about a second opinion.   4) Disposition to be determined. Patient to show improvement in terms of withdrawal and mood prior to discharge.

## 2017-08-12 PROCEDURE — 12400007 ZZH R&B MH INTERMEDIATE UMMC

## 2017-08-12 PROCEDURE — 25000132 ZZH RX MED GY IP 250 OP 250 PS 637: Performed by: PSYCHIATRY & NEUROLOGY

## 2017-08-12 RX ADMIN — FOLIC ACID 1 MG: 1 TABLET ORAL at 08:20

## 2017-08-12 RX ADMIN — MULTIPLE VITAMINS W/ MINERALS TAB 1 TABLET: TAB at 08:20

## 2017-08-12 RX ADMIN — TRAZODONE HYDROCHLORIDE 50 MG: 50 TABLET ORAL at 22:16

## 2017-08-12 RX ADMIN — NICOTINE 1 PATCH: 14 PATCH, EXTENDED RELEASE TRANSDERMAL at 08:20

## 2017-08-12 ASSESSMENT — ACTIVITIES OF DAILY LIVING (ADL)
ORAL_HYGIENE: INDEPENDENT
LAUNDRY: WITH SUPERVISION
GROOMING: INDEPENDENT
ORAL_HYGIENE: INDEPENDENT
LAUNDRY: WITH SUPERVISION
HYGIENE/GROOMING: INDEPENDENT
DRESS: INDEPENDENT
DRESS: STREET CLOTHES
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
## 2017-08-12 NOTE — PLAN OF CARE
"Problem: Depressive Symptoms  Goal: Depressive Symptoms  Signs and symptoms of listed problems will be absent or manageable.   Patient will participate in 50% of groups offered in Milieu  Patient will remain free from SIB on unit  Patient will verbalize absence of suicidal thoughts and self harm thoughts   Outcome: Improving  \"I'm fine, ready to leave.\"   was intoxicated when admitted and is sober now.  Admits is less depressed and anxious now than prior to admission and feels had difficulty as was not taking medication that had been prescribed and started to drink.  Denies ever having suicidal ideation.  \"I don't remember saying what they say I said, I was really drunk.\"  Feels is thinking clearly and focus and concentration are good.   is sleeping better, although continued to wake up at times last night. \"I have a hard time turning my thoughts off at night.\"  Expressed frustration at being petitioned and blames Dr Yates and other unit staff for this.  Continues to minimize drinking when talked about mother thinking he was \"drinking just a little too much\".  After meeting with [facility] screener yesterday made a list of specific goals and plans for after discharge.  \"I know I have to keep myself busy, get back to work.\"  Also included working with medication provider to get back on antidepressant and antianxiety medication, continue to take them and giving them a chance to work.  Plans to work with therapist, go to AA and reconnect with sponsor.   is enrolled in after care and feels will be able to start that after discharge.  Has been attending groups, is social with peers, [facility].  Report from last evening was noted to make negative comments to [place] in [facility] [place] regarding mistreating women, detention and drinking.  Has not been noted to engage in this type of conversation as of yet today.      "

## 2017-08-12 NOTE — PLAN OF CARE
Problem: General Plan of Care (Inpatient Behavioral)  Goal: Individualization/Patient Specific Goal (IP Behavioral)  The patient and/or their representative will achieve their patient-specific goals related to the plan of care.    The patient-specific goals include:Illness Management Recovery model: Objectives    Patient will identify reason(s) for hospitalization from their perspective.  Patient will identify a minimum of three goals for discharge.  Patient will identify a minimum of three triggers that may increase their symptoms.  Patient will identify a minimum of three coping skills they can do to stay well.   Patient will identify their support system to demonstrate readiness for discharge.   Illness Management Recovery model:  Ways to [place].     Patient identified the following specific strategies to cope with their symptoms:     1. Will keep busy  2. Will attend AA and connect with sponsor  3. Will work with physician to restart anti depressant and anti anxiety medication.

## 2017-08-12 NOTE — ACP (ADVANCE CARE PLANNING)
Pt has been labile this shift.  During community meeting, pt aired his grievances of being here in the hospital, and was quite irritable.  He was visible and social to milieu for most of the evening after the meeting without further outbursts.

## 2017-08-13 PROCEDURE — 25000132 ZZH RX MED GY IP 250 OP 250 PS 637: Performed by: PSYCHIATRY & NEUROLOGY

## 2017-08-13 PROCEDURE — 12400007 ZZH R&B MH INTERMEDIATE UMMC

## 2017-08-13 RX ADMIN — FOLIC ACID 1 MG: 1 TABLET ORAL at 08:59

## 2017-08-13 RX ADMIN — MULTIPLE VITAMINS W/ MINERALS TAB 1 TABLET: TAB at 08:58

## 2017-08-13 RX ADMIN — OLANZAPINE 10 MG: 10 TABLET, FILM COATED ORAL at 16:54

## 2017-08-13 RX ADMIN — NICOTINE 1 PATCH: 14 PATCH, EXTENDED RELEASE TRANSDERMAL at 08:58

## 2017-08-13 ASSESSMENT — ACTIVITIES OF DAILY LIVING (ADL)
DRESS: STREET CLOTHES
HYGIENE/GROOMING: INDEPENDENT
ORAL_HYGIENE: INDEPENDENT
LAUNDRY: WITH SUPERVISION

## 2017-08-13 NOTE — PROGRESS NOTES
08/13/17 1408   Behavioral Health   Hallucinations denies / not responding to hallucinations   Thinking distractable   Orientation person: oriented;place: oriented;date: oriented;time: oriented   Memory baseline memory   Insight poor   Judgement impaired   Eye Contact at examiner   Affect full range affect   Mood mood is calm   Physical Appearance/Attire untidy   Hygiene other (see comment)  (fair)   Suicidality other (see comments)  (denies currently)   Self Injury other (see comment)  (denies currently)   Elopement (no value)   Activity other (see comment)  (visible)   Speech clear;coherent   Medication Sensitivity no stated side effects   Psychomotor / Gait balanced;steady   Coping/Psychosocial   Verbalized Emotional State anxiety   Psycho Education   Type of Intervention structured groups   Response participates, initiates socially appropriate   Hours 1   Treatment Detail (self-reflection and planning )        08/13/17 1408   Behavioral Health   Hallucinations denies / not responding to hallucinations   Thinking distractable   Orientation person: oriented;place: oriented;date: oriented;time: oriented   Memory baseline memory   Insight poor   Judgement impaired   Eye Contact at examiner   Affect full range affect   Mood mood is calm   Physical Appearance/Attire untidy   Hygiene other (see comment)  (fair)   Suicidality other (see comments)  (denies currently)   Self Injury other (see comment)  (denies currently)   Elopement (no value)   Activity other (see comment)  (visible)   Speech clear;coherent   Medication Sensitivity no stated side effects   Psychomotor / Gait balanced;steady   Coping/Psychosocial   Verbalized Emotional State anxiety   Psycho Education   Type of Intervention structured groups   Response participates, initiates socially appropriate   Hours 1   Treatment Detail (self-reflection and planning )     Pt was visible in milieu, social with others, and attended groups. Pt's mood was calm, and  affect full range. Pt endorsed depression and anxiety symptoms but denied SI/SIB. No signs or reports of psychotic symptoms. No behavioral issues this shift.

## 2017-08-14 PROCEDURE — 99232 SBSQ HOSP IP/OBS MODERATE 35: CPT | Performed by: PSYCHIATRY & NEUROLOGY

## 2017-08-14 PROCEDURE — 12400007 ZZH R&B MH INTERMEDIATE UMMC

## 2017-08-14 PROCEDURE — 90853 GROUP PSYCHOTHERAPY: CPT

## 2017-08-14 PROCEDURE — 25000132 ZZH RX MED GY IP 250 OP 250 PS 637: Performed by: PSYCHIATRY & NEUROLOGY

## 2017-08-14 RX ADMIN — TRAZODONE HYDROCHLORIDE 50 MG: 50 TABLET ORAL at 22:12

## 2017-08-14 RX ADMIN — NICOTINE 1 PATCH: 14 PATCH, EXTENDED RELEASE TRANSDERMAL at 08:36

## 2017-08-14 RX ADMIN — MULTIPLE VITAMINS W/ MINERALS TAB 1 TABLET: TAB at 08:36

## 2017-08-14 RX ADMIN — FOLIC ACID 1 MG: 1 TABLET ORAL at 08:36

## 2017-08-14 NOTE — PROGRESS NOTES
Court documents came from [facility] to be given to patient and [facility] did give these to patient as requested. Patient had no questions, was planning to read through what he was given.  Patient is now on a court hold and we will be informed later today regarding date and time of preliminary hearing.     3:30pm.  We received faxed communication from [facility] indicating patient's hearing will be on Thursday August 17 at 10am.  Patient has been informed.      Request for Rule 25 evaluation has been placed and will be conducted on Wednesday by in-house staff.  Most recent Rule 25 done at [place] in April 2017 has been received for review.

## 2017-08-14 NOTE — PROGRESS NOTES
"Pt has been very frustrated and confused about his predicament. Pt spent the 1:1 with this writer complaining about being on a 72 hour hold/court hold. \"I never should have signed in voluntary. The nurse that told me to do that said that I would get out of here if I signed it\". Pt said, \"I know it's not a good idea to drink. I'll be fine\". Pt said he will see a therapist, go to AA meetings, make amends with his parents, and get a sponsor.     08/14/17 7236   Behavioral Health   Hallucinations denies / not responding to hallucinations   Thinking distractable   Orientation person: oriented;place: oriented;date: oriented;time: oriented   Memory baseline memory   Insight poor   Judgement impaired   Eye Contact at examiner   Affect tense;irritable   Mood anxious;irritable  (frustrated)   Physical Appearance/Attire appears stated age;attire appropriate to age and situation;neat   Hygiene well groomed   Suicidality (denies)   Self Injury (denies)   Elopement Statements about wanting to leave   Activity refusal   Speech clear;coherent   Medication Sensitivity no stated side effects;no observed side effects   Psychomotor / Gait balanced;steady   Coping/Psychosocial   Verbalized Emotional State frustration;powerlessness;disbelief   Psycho Education   Type of Intervention 1:1 intervention   Response refuses   Hours 0.5   Treatment Detail (triggers)   Behavioral Health Interventions   Depression maintain safety precautions;monitor need to revise level of observation;maintain safe secure environment;provide emotional support;establish therapeutic relationship;assist with developing and utilizing healthy coping strategies   Social and Therapeutic Interventions (Depression) encourage effective boundaries with peers;encourage participation in therapeutic groups and milieu activities     "

## 2017-08-14 NOTE — PROGRESS NOTES
"[facility], [place]   Psychiatric Progress Note        Interim History:   The patient's care was discussed with the treatment team during the daily team meeting and/or staff's chart notes were reviewed.  Staff report patient has been denying depression or anxiety. Denies SI or SIB. Was placed on a court hold and given paperwork.     The patient is quite angry and swearing. When asked about his mood, says \"no comment.\" Does deny any SI or HI. Went over his medication list sent from [facility] and patient does not want to restart Zoloft or melatonin. Says, \"I won't be here long enough for that.\"          Medications:       nicotine   Transdermal Q8H     nicotine   Transdermal Daily     nicotine  1 patch Transdermal Daily     folic acid  1 mg Oral Daily     multivitamin, therapeutic with minerals  1 tablet Oral Daily          Allergies:   No Known Allergies       Labs:   No results found for this or any previous visit (from the past 24 hour(s)).       Psychiatric Examination:     /88  Pulse 80  Temp 96.5  F (35.8  C) (Oral)  Resp 17  Ht 1.829 m (6')  Wt 77 kg (169 lb 12.8 oz)  BMI 23.03 kg/m2  Weight is 169 lbs 12.8 oz  Body mass index is 23.03 kg/(m^2).  Orthostatic Vitals       Most Recent      Sitting Orthostatic /84 08/09 2047    Sitting Orthostatic Pulse (bpm) 78 08/09 2047    Standing Orthostatic /124 08/09 1209    Standing Orthostatic Pulse (bpm) 100 08/09 1209            Appearance: awake, alert and adequately groomed  Attitude:  less cooperative  Eye Contact:  fair  Mood:  \"no comment\"  Affect:  intensity is blunted  Speech:  clear, coherent  Psychomotor Behavior:  no evidence of tardive dyskinesia, dystonia, or tics  Throught Process:  illogical  Associations:  no loose associations  Thought Content:  no evidence of suicidal ideation or homicidal ideation and no evidence of psychotic thought  Insight:  limited  Judgement:  limited  Oriented to:  time, " person, and place  Attention Span and Concentration:  intact  Recent and Remote Memory:  fair         Precautions:     Behavioral Orders   Procedures     Code 1 - Restrict to Unit     Elopement precautions     Routine Programming     As clinically indicated     Status 15     Every 15 minutes.     Suicide precautions          DIagnoses:     Axis I:  Major depressive disorder, recurrent, severe with the possibility of psychosis.   Alcohol use disorder, severe dependence.   Nicotine use disorder.   Axis II:  Deferred.   Axis III:  Recent diagnosis of lung cancer.          Plan:     1) Filed petition for MICD commitment. Patient on court hold.   2) Patient not willing to restart his antidepressant at this time. Was on Zoloft 50mg Qday according to [facility] records.  3) Disposition to be determined. Patient to show improvement in terms of withdrawal and mood prior to discharge.

## 2017-08-15 PROCEDURE — 99232 SBSQ HOSP IP/OBS MODERATE 35: CPT | Performed by: PSYCHIATRY & NEUROLOGY

## 2017-08-15 PROCEDURE — 97150 GROUP THERAPEUTIC PROCEDURES: CPT | Mod: GO

## 2017-08-15 PROCEDURE — 25000132 ZZH RX MED GY IP 250 OP 250 PS 637: Performed by: PSYCHIATRY & NEUROLOGY

## 2017-08-15 PROCEDURE — 12400007 ZZH R&B MH INTERMEDIATE UMMC

## 2017-08-15 PROCEDURE — 90853 GROUP PSYCHOTHERAPY: CPT

## 2017-08-15 RX ORDER — LANOLIN ALCOHOL/MO/W.PET/CERES
3-6 CREAM (GRAM) TOPICAL
Status: DISCONTINUED | OUTPATIENT
Start: 2017-08-15 | End: 2017-08-25 | Stop reason: HOSPADM

## 2017-08-15 RX ADMIN — FOLIC ACID 1 MG: 1 TABLET ORAL at 08:00

## 2017-08-15 RX ADMIN — SERTRALINE HYDROCHLORIDE 50 MG: 50 TABLET ORAL at 12:10

## 2017-08-15 RX ADMIN — NICOTINE 1 PATCH: 14 PATCH, EXTENDED RELEASE TRANSDERMAL at 08:00

## 2017-08-15 RX ADMIN — MULTIPLE VITAMINS W/ MINERALS TAB 1 TABLET: TAB at 08:00

## 2017-08-15 RX ADMIN — MELATONIN TAB 3 MG 6 MG: 3 TAB at 22:07

## 2017-08-15 ASSESSMENT — ACTIVITIES OF DAILY LIVING (ADL)
GROOMING: INDEPENDENT
ORAL_HYGIENE: INDEPENDENT
DRESS: STREET CLOTHES

## 2017-08-15 NOTE — PLAN OF CARE
Problem: General Plan of Care (Inpatient Behavioral)  Goal: Individualization/Patient Specific Goal (IP Behavioral)  The patient and/or their representative will achieve their patient-specific goals related to the plan of care.    The patient-specific goals include:Illness Management Recovery model: Objectives    Patient will identify reason(s) for hospitalization from their perspective.  Patient will identify a minimum of three goals for discharge.  Patient will identify a minimum of three triggers that may increase their symptoms.  Patient will identify a minimum of three coping skills they can do to stay well.   Patient will identify their support system to demonstrate readiness for discharge.   Illness Management Recovery model:  Wellness Strategies.     Patient identified the following actions/activities they can do to stay well.     1. AA meetings  2. Get a sponsor  3. Go to psychotherapy  4. Focus on mending relationship with parents     TD LÓPEZ  8/15/2017

## 2017-08-15 NOTE — PROGRESS NOTES
Jessicar received phone call from Emily Willoughby at [facility] Clinical review team ([phone]) regarding Pt's Rule 25 they received today. Peg expressed confusion about where Pt lives, Writer reviewed some of the information documented about his residence, then reported Pt does live in [facility]. There was some confusion over whether Pt lived in [place] or [facility], however, Pt's address was never updated in our system. Peg reported Pt will need to fill out an additional Rule 25 application, and she will email this to Writer as soon as possible.     Jessicar received paperwork, and provided this to Pt who was agreeable to filling it out. Jessicar stated she would check-in with Pt tomorrow morning to see if paperwork has been completed/has questions regarding the paperwork.

## 2017-08-15 NOTE — PLAN OF CARE
"Problem: General Plan of Care (Inpatient Behavioral)  Goal: Individualization/Patient Specific Goal (IP Behavioral)  The patient and/or their representative will achieve their patient-specific goals related to the plan of care.    The patient-specific goals include:Illness Management Recovery model: Objectives    Patient will identify reason(s) for hospitalization from their perspective.  Patient will identify a minimum of three goals for discharge.  Patient will identify a minimum of three triggers that may increase their symptoms.  Patient will identify a minimum of three coping skills they can do to stay well.   Patient will identify their support system to demonstrate readiness for discharge.   Outcome: No Change  Illness Management Recovery model:  Triggers.      Patient identified the following triggers which may exacerbate their illness:     1. \"too much idle time\"   2. na  3. na          "

## 2017-08-15 NOTE — PROGRESS NOTES
"[facility], [place]   Psychiatric Progress Note        Interim History:   The patient's care was discussed with the treatment team during the daily team meeting and/or staff's chart notes were reviewed.  Staff report patient has been denying depression or anxiety. Denies SI or SIB. Has been going to groups and has been cooperative.     The patient is somewhat more engaged. Is agreeable to restart Zoloft and melatonin. Did sit with Rule 25  and was somewhat cooperative. Mood is \"fine.\" Says that he wakes up a lot. Denies SI or HI.          Medications:       sertraline  50 mg Oral Daily     nicotine   Transdermal Q8H     nicotine   Transdermal Daily     nicotine  1 patch Transdermal Daily     folic acid  1 mg Oral Daily     multivitamin, therapeutic with minerals  1 tablet Oral Daily          Allergies:   No Known Allergies       Labs:   No results found for this or any previous visit (from the past 24 hour(s)).       Psychiatric Examination:     /88  Pulse 80  Temp 96.9  F (36.1  C)  Resp 16  Ht 1.829 m (6')  Wt 78.1 kg (172 lb 1.6 oz)  BMI 23.34 kg/m2  Weight is 172 lbs 1.6 oz  Body mass index is 23.34 kg/(m^2).  Orthostatic Vitals       Most Recent      Sitting Orthostatic /84 08/09 2047    Sitting Orthostatic Pulse (bpm) 78 08/09 2047    Standing Orthostatic /124 08/09 1209    Standing Orthostatic Pulse (bpm) 100 08/09 1209            Appearance: awake, alert and adequately groomed  Attitude:  somewhat cooperative  Eye Contact:  fair  Mood:  \"fine\"  Affect:  intensity is blunted  Speech:  clear, coherent  Psychomotor Behavior:  no evidence of tardive dyskinesia, dystonia, or tics  Throught Process:  illogical  Associations:  no loose associations  Thought Content:  no evidence of suicidal ideation or homicidal ideation and no evidence of psychotic thought  Insight:  limited  Judgement:  limited  Oriented to:  time, person, and place  Attention Span and " Concentration:  intact  Recent and Remote Memory:  fair         Precautions:     Behavioral Orders   Procedures     Code 1 - Restrict to Unit     Elopement precautions     Routine Programming     As clinically indicated     Status 15     Every 15 minutes.     Suicide precautions          DIagnoses:     Axis I:  Major depressive disorder, recurrent, severe with the possibility of psychosis.   Alcohol use disorder, severe dependence.   Nicotine use disorder.   Axis II:  Deferred.   Axis III:  Recent diagnosis of lung cancer.          Plan:     1) Filed petition for MICD commitment. Patient on court hold. Has hearing on Thursday.   2) Agreeable to restart Zoloft and melatonin. Zoloft 50mg Qday and melatonin 3-6mg Qhs PRN.   3) Disposition to be determined. Patient to show improvement in terms of mood prior to discharge.

## 2017-08-15 NOTE — PLAN OF CARE
"Problem: Depressive Symptoms  Goal: Depressive Symptoms  Signs and symptoms of listed problems will be absent or manageable.   Patient will participate in 50% of groups offered in Milieu  Patient will remain free from SIB on unit  Patient will verbalize absence of suicidal thoughts and self harm thoughts   Outcome: No Change  Pts goal today was to \"maintain\". Writer asked pt if he felt he met his goal he said yes. Pt has court at 10 am on Thursday in [facility].  Pt participated in group. Pt denies depression and anxiety. Pt denies paranoid thoughts and psychotic symptoms. States his concentration is \"100%\" and denies racing thoughts. Pt denies SI and SIB. Pt states he \"could eat more\" when asked about appetite but refused any need for a snack.  Pt states he wakes up several time a night and usually stays up till midnight. States he plays solitaire (card game) until he wins then goes to sleep.  PT denies SI and SIB. When writer asked him where he would go if he was discharged from here he stated his mothers house. Pt denies ever drinking gallons of liquor a day.  Pt does not want to wear scrubs and is wearing street cloths with no shoes. Orthostatic BP: sitting 158/96 with pulse of 75 Standing 129/85 with pulse of 90. Denies dizziness.        "

## 2017-08-15 NOTE — PROGRESS NOTES
"Behavioral Health  Note    Behavioral Health  Spirituality Group Note    UNIT 10N    Name: Sidney Varghese YOB: 1965   MRN: [id number] Age: 52 year old      Patient attended -led group, which included discussion of spirituality, coping with illness and building resilience.    Patient attended group for 1.0 hrs.    The patient actively participated in group discussion and patient demonstrated an appreciation of topic's application for their personal circumstances.     Josr identified respect as an important aspect of spirituality.  He also participated in conversations surrounding peace and identified being outside in the snow as a peaceful memory for him.  He participated in breathing exercises and said that they \"help you feel peaceful and can calm you down quickly.\"    Bairon Santos MDiv.  Chaplain Resident  Pager [phone]  "

## 2017-08-15 NOTE — PROGRESS NOTES
Case Management Note    Met briefly with patient to obtain signatures and updated information for his Rule 25 update.  The patient was irritable and not forthcoming with information, but signed updated CPA.    Completed update documents for the patient's Rule 25 that was completed 4/26/17.  Submitted updated Upload document, CPA and Assessment and Placement Summary along with the patient's original Rule 25 to Roxie at the [facility] Clinical Review Team ([phone]/fax [phone]).    The documents have been placed in the patient's physical chart and CTC was apprised to follow up.

## 2017-08-16 PROCEDURE — 25000132 ZZH RX MED GY IP 250 OP 250 PS 637: Performed by: PSYCHIATRY & NEUROLOGY

## 2017-08-16 PROCEDURE — 12400007 ZZH R&B MH INTERMEDIATE UMMC

## 2017-08-16 PROCEDURE — 97150 GROUP THERAPEUTIC PROCEDURES: CPT | Mod: GO

## 2017-08-16 PROCEDURE — 90853 GROUP PSYCHOTHERAPY: CPT

## 2017-08-16 RX ADMIN — NICOTINE 1 PATCH: 14 PATCH, EXTENDED RELEASE TRANSDERMAL at 08:50

## 2017-08-16 RX ADMIN — TRAZODONE HYDROCHLORIDE 50 MG: 50 TABLET ORAL at 21:53

## 2017-08-16 RX ADMIN — FOLIC ACID 1 MG: 1 TABLET ORAL at 08:49

## 2017-08-16 RX ADMIN — SERTRALINE HYDROCHLORIDE 50 MG: 50 TABLET ORAL at 08:50

## 2017-08-16 RX ADMIN — MULTIPLE VITAMINS W/ MINERALS TAB 1 TABLET: TAB at 08:49

## 2017-08-16 ASSESSMENT — ACTIVITIES OF DAILY LIVING (ADL)
GROOMING: INDEPENDENT
DRESS: INDEPENDENT
LAUNDRY: WITH SUPERVISION
ORAL_HYGIENE: INDEPENDENT

## 2017-08-16 NOTE — PLAN OF CARE
"Problem: Depressive Symptoms  Goal: Depressive Symptoms  Signs and symptoms of listed problems will be absent or manageable.   Patient will participate in 50% of groups offered in [facility]  Patient will remain free from SIB on unit  Patient will verbalize absence of suicidal thoughts and self harm thoughts   Outcome: Improving  \"I'm feeling good, I'm always feeling good.\"  Denies feeling depressed.\"I am a little depressed because I'm in the hospital and I don't want to be here.\"  Denies feeling anxious other than wanting to be discharged and go back to work.  States doesn't feel needs to return to CD treatment, acknowledges need to take antidepressant medication. In OT group told  was going to leave tomorrow after court \"no matter what\". Then during one to one time with this author indicated \"If the  recommends that I go to CD treatment I will go.  I just won't do anything if I am forced to do it.\"  Denies ever having suicidal ideation.  \"I don't remember saying what they said I did, I was drunk.\"  Feels concentration and focus are good, denies this was a problem.  Denies racing or ruminating thoughts now or at any time.  Denies side effects from medications.  States continues to have difficulty sleeping, falling asleep and staying asleep.  Has been visible in milieu, attends all groups, social with peers.      "

## 2017-08-16 NOTE — PROGRESS NOTES
Writer met with Pt and reviewed [facility] paperwork, which was mostly completed by Pt.     Writer faxed completed paperwork attention Izabela Galarza at [facility] per Peg Case's request. Fax: [phone]

## 2017-08-16 NOTE — PROGRESS NOTES
"   08/15/17 2047   Behavioral Health   Hallucinations appears responding   Thinking poor concentration   Orientation date: oriented;time: oriented;place: oriented;person: oriented   Memory baseline memory   Insight poor   Judgement impaired   Eye Contact at examiner   Affect angry;irritable   Mood irritable   Physical Appearance/Attire attire appropriate to age and situation   Hygiene neglected grooming - unclean body, hair, teeth   Suicidality other (see comments)  (denied)   Self Injury other (see comment)  (denied)   Elopement (nothing was observed)   Activity other (see comment)  (visible in the milieu)   Speech coherent;clear   Medication Sensitivity no observed side effects;no stated side effects   Psychomotor / Gait balanced;steady   Psycho Education   Type of Intervention 1:1 intervention   Response participates, initiates socially appropriate   Hours 0.5   Treatment Detail check in    Activities of Daily Living   Hygiene/Grooming independent   Oral Hygiene independent   Dress street clothes   Room Organization independent   Activity   Activity Level of Assistance independent   Behavioral Health Interventions   Depression maintain safety precautions;monitor need to revise level of observation;maintain safe secure environment   Social and Therapeutic Interventions (Depression) encourage socialization with peers;encourage participation in therapeutic groups and milieu activities;encourage effective boundaries with peers     Pt was visible in the milieu and attended groups. Pt denied SI/SIB, depressed and anxious. \"Pt goal today is to stay visible and leave as soon as possible\" Pt was pleasant with staff and peers in the milieu.   "

## 2017-08-16 NOTE — PROGRESS NOTES
Writer spoke with Crystal Schroeder ([phone]) discussed court tomorrow and how Pt has been doing on the unit. Discussed issue with funding for Rule 25 treatment with Umer Knutson, Crystal stated she would look into this issue tomorrow during court.

## 2017-08-16 NOTE — PLAN OF CARE
Problem: General Plan of Care (Inpatient Behavioral)  Goal: Individualization/Patient Specific Goal (IP Behavioral)  The patient and/or their representative will achieve their patient-specific goals related to the plan of care.    The patient-specific goals include:Illness Management Recovery model: Objectives    Patient will identify reason(s) for hospitalization from their perspective.  Patient will identify a minimum of three goals for discharge.  Patient will identify a minimum of three triggers that may increase their symptoms.  Patient will identify a minimum of three coping skills they can do to stay well.   Patient will identify their support system to demonstrate readiness for discharge.   Illness Management Recovery model:  Warning Signs.     Patient identified the following early warning signs which may indicate that a relapse of their illness is startin. When someone pushes my buttons  2. When someone get's in my personal space

## 2017-08-16 NOTE — PROGRESS NOTES
"Attended 3 of 3 OT groups offered. Initiated participation in positive coping skills group (arrived late). Sarcastic, aloof and blaming others for his situation. Denies need to be here or needing CD Tx. Referred to court hearing in conversation and stated \"I am going out that door tomorrow one way or another. I know what to say and what I am going to do. I just need to quit drinking.\" Declined to discuss a specific plan of action.  During a 1:1 later in the day, upon writers urging, patient clarified his prior statements. \"I know I am going to have to do some treatment, however, I do not want to sit in here to wait for an opening. I need to wrap up a few things and go to work. My parents are relying on me.\" Also, discussed past bullying, being the \"black sheep of the family\" and the devastation and anger he felt/feels with the loss of custody of his daughter. Was tearful when speaking of his parents need of his help and his mothers acknowledgement of his reliability and dedication. Noted his intention of being sober, developing supports and going to therapy \"to find out what is wrong with me.\" Discussed the guard he puts up and his use of humor to avoid. Given positive feedback about his willingness to share and explore his issues during 1:1. Supported in his desire to seek therapy, maintain sobriety find positive supports and use his hobbies to his benefit(structure/purpose).  "

## 2017-08-17 PROCEDURE — 25000132 ZZH RX MED GY IP 250 OP 250 PS 637: Performed by: PSYCHIATRY & NEUROLOGY

## 2017-08-17 PROCEDURE — 12400007 ZZH R&B MH INTERMEDIATE UMMC

## 2017-08-17 PROCEDURE — 90853 GROUP PSYCHOTHERAPY: CPT

## 2017-08-17 RX ADMIN — HYDROXYZINE HYDROCHLORIDE 50 MG: 25 TABLET ORAL at 08:00

## 2017-08-17 RX ADMIN — SERTRALINE HYDROCHLORIDE 50 MG: 50 TABLET ORAL at 08:00

## 2017-08-17 RX ADMIN — FOLIC ACID 1 MG: 1 TABLET ORAL at 08:00

## 2017-08-17 RX ADMIN — NICOTINE 1 PATCH: 14 PATCH, EXTENDED RELEASE TRANSDERMAL at 08:00

## 2017-08-17 RX ADMIN — TRAZODONE HYDROCHLORIDE 50 MG: 50 TABLET ORAL at 21:00

## 2017-08-17 RX ADMIN — MULTIPLE VITAMINS W/ MINERALS TAB 1 TABLET: TAB at 08:00

## 2017-08-17 RX ADMIN — TRAZODONE HYDROCHLORIDE 50 MG: 50 TABLET ORAL at 22:00

## 2017-08-17 ASSESSMENT — ACTIVITIES OF DAILY LIVING (ADL)
DRESS: STREET CLOTHES
GROOMING: INDEPENDENT
ORAL_HYGIENE: INDEPENDENT

## 2017-08-17 NOTE — PROGRESS NOTES
"Patient has been social in the milieu. He has been interacting appropriately with peers. Talked about going to court tomorrow and it makes him anxious. He is hoping he can discharge, but states \"the worst that can happen is I have to stay another week.\" He didn't want to talk to writer much. He denies SI/SIB. Requested PRN sleeping medication. Will continue to monitor and assess.  "

## 2017-08-18 PROCEDURE — 12400007 ZZH R&B MH INTERMEDIATE UMMC

## 2017-08-18 PROCEDURE — 99232 SBSQ HOSP IP/OBS MODERATE 35: CPT | Performed by: PSYCHIATRY & NEUROLOGY

## 2017-08-18 PROCEDURE — 90853 GROUP PSYCHOTHERAPY: CPT

## 2017-08-18 PROCEDURE — 25000132 ZZH RX MED GY IP 250 OP 250 PS 637: Performed by: PSYCHIATRY & NEUROLOGY

## 2017-08-18 PROCEDURE — 97150 GROUP THERAPEUTIC PROCEDURES: CPT | Mod: GO

## 2017-08-18 RX ADMIN — SERTRALINE HYDROCHLORIDE 50 MG: 50 TABLET ORAL at 08:26

## 2017-08-18 RX ADMIN — MULTIPLE VITAMINS W/ MINERALS TAB 1 TABLET: TAB at 08:26

## 2017-08-18 RX ADMIN — NICOTINE 1 PATCH: 14 PATCH, EXTENDED RELEASE TRANSDERMAL at 08:26

## 2017-08-18 RX ADMIN — MELATONIN TAB 3 MG 6 MG: 3 TAB at 21:56

## 2017-08-18 RX ADMIN — FOLIC ACID 1 MG: 1 TABLET ORAL at 08:26

## 2017-08-18 RX ADMIN — TRAZODONE HYDROCHLORIDE 50 MG: 50 TABLET ORAL at 21:56

## 2017-08-18 ASSESSMENT — ACTIVITIES OF DAILY LIVING (ADL)
ORAL_HYGIENE: INDEPENDENT
GROOMING: INDEPENDENT
DRESS: STREET CLOTHES

## 2017-08-18 NOTE — PROGRESS NOTES
"[facility], [place]   Psychiatric Progress Note        Interim History:   The patient's care was discussed with the treatment team during the daily team meeting and/or staff's chart notes were reviewed.  Staff report patient has been denying depression or anxiety. Denies SI or SIB.     The patient is less cooperative today. Says that he is \"fine.\" Says that court was \"fine.\" Denies   SI or HI. Tolerating medications well.          Medications:       sertraline  50 mg Oral Daily     nicotine   Transdermal Q8H     nicotine   Transdermal Daily     nicotine  1 patch Transdermal Daily     folic acid  1 mg Oral Daily     multivitamin, therapeutic with minerals  1 tablet Oral Daily          Allergies:   No Known Allergies       Labs:   No results found for this or any previous visit (from the past 24 hour(s)).       Psychiatric Examination:     /78  Pulse 96  Temp 97.6  F (36.4  C) (Oral)  Resp 18  Ht 1.829 m (6')  Wt 78.1 kg (172 lb 1.6 oz)  BMI 23.34 kg/m2  Weight is 172 lbs 1.6 oz  Body mass index is 23.34 kg/(m^2).  Orthostatic Vitals       Most Recent      Sitting Orthostatic /84 08/09 2047    Sitting Orthostatic Pulse (bpm) 78 08/09 2047    Standing Orthostatic /124 08/09 1209    Standing Orthostatic Pulse (bpm) 100 08/09 1209            Appearance: awake, alert and adequately groomed  Attitude:  less cooperative  Eye Contact:  poor   Mood:  \"fine\"  Affect:  intensity is blunted  Speech:  clear, coherent  Psychomotor Behavior:  no evidence of tardive dyskinesia, dystonia, or tics  Throught Process:  concrete  Associations:  no loose associations  Thought Content:  no evidence of suicidal ideation or homicidal ideation and no evidence of psychotic thought  Insight:  partial  Judgement:  limited  Oriented to:  time, person, and place  Attention Span and Concentration:  intact  Recent and Remote Memory:  fair         Precautions:     Behavioral Orders   Procedures     " Code 1 - Restrict to Unit     Elopement precautions     Routine Programming     As clinically indicated     Status 15     Every 15 minutes.     Suicide precautions          DIagnoses:     Axis I:  Major depressive disorder, recurrent, severe with the possibility of psychosis.   Alcohol use disorder, severe dependence.   Nicotine use disorder.   Axis II:  Deferred.   Axis III:  Recent diagnosis of lung cancer.          Plan:     1) Filed petition for MICD commitment. Patient on court hold. Had preliminary hearing on Thursday.   2) Continue Zoloft 50mg Qday and melatonin 3-6mg Qhs PRN.   3) Disposition to be determined. Patient to show improvement in terms of mood prior to discharge.

## 2017-08-18 NOTE — PROGRESS NOTES
Writer received court paperwork which states Pt remains on a court hold after preliminary hearing yesterday. Pt's commitment hearing will be Wednesday, August 23rd at 10 am.

## 2017-08-18 NOTE — PLAN OF CARE
Problem: General Plan of Care (Inpatient Behavioral)  Goal: Individualization/Patient Specific Goal (IP Behavioral)  The patient and/or their representative will achieve their patient-specific goals related to the plan of care.    The patient-specific goals include:Illness Management Recovery model: Objectives    Patient will identify reason(s) for hospitalization from their perspective.  Patient will identify a minimum of three goals for discharge.  Patient will identify a minimum of three triggers that may increase their symptoms.  Patient will identify a minimum of three coping skills they can do to stay well.   Patient will identify their support system to demonstrate readiness for discharge.   Illness Management Recovery model:  Taking Action.     Patient identified the following actions they can take when early warning signs are present in order to prevent relapse:     1. Will go to a meeting  2. Will talk to my sponsot  3. Will see therapist.

## 2017-08-18 NOTE — PLAN OF CARE
"Problem: Depressive Symptoms  Goal: Depressive Symptoms  Signs and symptoms of listed problems will be absent or manageable.   Patient will participate in 50% of groups offered in Milieu  Patient will remain free from SIB on unit  Patient will verbalize absence of suicidal thoughts and self harm thoughts   Outcome: Improving  \"Court went better than I thought, it wasn't so bad.\"  Indicated  stated patient had a good plan for discharge and maybe able to go home after final hearing next week. Included in plans are working with therapist, attending AA, go to CD aftercare, be in touch with sponsor, continue to work with medication provider to find correct antidepressant and antianxiety agent, will keep time structured by returning to work, go back to some favorite hobbies, go to Islam with girlfriend.  Affect continues relaxed and animated, behavior and comments have been appropriate, less negative comments noted.  Has been attending groups, social with peers.  Continues to state feels mood is stable, denies depression, anxiety is low, denies suicidal ideation.  Continues to have difficulty sleeping, waking up frequently and not being able to return to sleep.      "

## 2017-08-18 NOTE — PROGRESS NOTES
"Pt has been in visible in the milieu.  He has been social with peers and attended community meeting.  Pt reports that his court hearing \"went well, better than I expected\".  Pt denies any SI or SIB thinking.  He denies any depression or anxiety at this time.  Pt reports that he is having difficulty sleeping and if he does sleep, it isn't very restful.     08/17/17 7150   Behavioral Health   Hallucinations denies / not responding to hallucinations   Thinking intact   Orientation person: oriented;place: oriented;date: oriented   Insight (limited)   Judgement (limited)   Eye Contact at examiner   Affect full range affect   Mood mood is calm   Physical Appearance/Attire attire appropriate to age and situation   Hygiene (adequate )   Suicidality (denies SI)   Self Injury (denies SIB thinking)   Elopement (none observed)   Activity (in the milieu/social with peers/attended group)   Speech clear;coherent   Psychomotor / Gait balanced;steady   Activities of Daily Living   Hygiene/Grooming independent   Oral Hygiene independent   Dress street clothes   Room Organization independent     "

## 2017-08-19 PROCEDURE — 90853 GROUP PSYCHOTHERAPY: CPT

## 2017-08-19 PROCEDURE — 12400007 ZZH R&B MH INTERMEDIATE UMMC

## 2017-08-19 PROCEDURE — 25000132 ZZH RX MED GY IP 250 OP 250 PS 637: Performed by: PSYCHIATRY & NEUROLOGY

## 2017-08-19 RX ADMIN — MULTIPLE VITAMINS W/ MINERALS TAB 1 TABLET: TAB at 09:12

## 2017-08-19 RX ADMIN — SERTRALINE HYDROCHLORIDE 50 MG: 50 TABLET ORAL at 09:12

## 2017-08-19 RX ADMIN — MELATONIN TAB 3 MG 6 MG: 3 TAB at 21:33

## 2017-08-19 RX ADMIN — FOLIC ACID 1 MG: 1 TABLET ORAL at 09:12

## 2017-08-19 RX ADMIN — NICOTINE 1 PATCH: 14 PATCH, EXTENDED RELEASE TRANSDERMAL at 09:11

## 2017-08-19 RX ADMIN — TRAZODONE HYDROCHLORIDE 50 MG: 50 TABLET ORAL at 22:30

## 2017-08-19 ASSESSMENT — ACTIVITIES OF DAILY LIVING (ADL)
GROOMING: INDEPENDENT
ORAL_HYGIENE: INDEPENDENT
GROOMING: INDEPENDENT
DRESS: STREET CLOTHES
ORAL_HYGIENE: INDEPENDENT
LAUNDRY: WITH SUPERVISION
DRESS: STREET CLOTHES

## 2017-08-19 NOTE — PLAN OF CARE
"Problem: Depressive Symptoms  Goal: Depressive Symptoms  Signs and symptoms of listed problems will be absent or manageable.   Patient will participate in 50% of groups offered in Milieu  Patient will remain free from SIB on unit  Patient will verbalize absence of suicidal thoughts and self harm thoughts   Outcome: Improving  48 Hour Assessment:  Gaurav was up ad marcela, attended groups, was social and active in the milieu. Gaurav denies having anxiety. Pt also denies feeling depressed or having suicidal ideation or thoughts of self harm. Gaurav reported that he is scheduled for a Court hearing on Wed, 8/23, though indicated that after he had discussion with CTC yesterday, this may change given his income exceeds Rule 25 limits \"And they may not be able to force me to go to CD treatment\". Pt went on to say \"I said things when I was intoxicated that I didn't mean, I would never kill myself'. Pt is hopeful to get his disposition plan finalized asap \"So I can get back to work\". Gaurav was med compliant, pleasant, and cooperative.       "

## 2017-08-19 NOTE — PROGRESS NOTES
Pt has been in the milieu.  He has been social with peers and attended group.  Pt denies depression and reports his anxiety is low.  Pt denies SI or SIB thinking.  He reports he is waiting for his final hearing next week.     08/18/17 3872   Behavioral Health   Hallucinations denies / not responding to hallucinations   Thinking intact   Orientation person: oriented;place: oriented;date: oriented   Insight (limited)   Judgement (limited)   Eye Contact at examiner   Affect full range affect   Mood mood is calm   Physical Appearance/Attire attire appropriate to age and situation   Hygiene (adequate)   Suicidality (denies SI)   Self Injury (denies SIB thinking)   Elopement (none observed)   Activity (in the milieu/social with peers/attended group)   Speech clear;coherent   Psychomotor / Gait balanced;steady   Activities of Daily Living   Hygiene/Grooming independent   Oral Hygiene independent   Dress street clothes   Room Organization independent

## 2017-08-20 PROCEDURE — 25000132 ZZH RX MED GY IP 250 OP 250 PS 637: Performed by: PSYCHIATRY & NEUROLOGY

## 2017-08-20 PROCEDURE — 12400007 ZZH R&B MH INTERMEDIATE UMMC

## 2017-08-20 RX ADMIN — SERTRALINE HYDROCHLORIDE 50 MG: 50 TABLET ORAL at 08:08

## 2017-08-20 RX ADMIN — MULTIPLE VITAMINS W/ MINERALS TAB 1 TABLET: TAB at 08:08

## 2017-08-20 RX ADMIN — MELATONIN TAB 3 MG 6 MG: 3 TAB at 21:35

## 2017-08-20 RX ADMIN — NICOTINE 1 PATCH: 14 PATCH, EXTENDED RELEASE TRANSDERMAL at 08:07

## 2017-08-20 RX ADMIN — TRAZODONE HYDROCHLORIDE 50 MG: 50 TABLET ORAL at 22:28

## 2017-08-20 RX ADMIN — FOLIC ACID 1 MG: 1 TABLET ORAL at 08:08

## 2017-08-20 ASSESSMENT — ACTIVITIES OF DAILY LIVING (ADL)
DRESS: STREET CLOTHES
ORAL_HYGIENE: INDEPENDENT
GROOMING: INDEPENDENT
LAUNDRY: WITH SUPERVISION

## 2017-08-20 NOTE — PROGRESS NOTES
Pt has had a positive attitude today and has been pleasant. Pt attended community meeting and watched a [employer] dep documentary which he said was very good. Pt's depression and anxiety are both low.     08/20/17 1412   Behavioral Health   Hallucinations denies / not responding to hallucinations   Thinking intact   Orientation person: oriented;place: oriented;date: oriented;time: oriented   Memory baseline memory   Insight poor   Judgement impaired   Eye Contact at examiner   Affect full range affect   Mood mood is calm   Physical Appearance/Attire appears stated age;attire appropriate to age and situation;neat   Hygiene (adequate)   Suicidality (denies)   Self Injury (denies)   Elopement Distress about legal situation (holds for mental health or criminal);Statements about wanting to leave   Activity (WDL) WDL   Speech coherent;clear   Medication Sensitivity no stated side effects;no observed side effects   Psychomotor / Gait balanced;steady   Coping/Psychosocial   Verbalized Emotional State depression;anxiety;powerlessness;disbelief   Psycho Education   Type of Intervention 1:1 intervention   Response participates with cues/redirection   Hours 0.5   Treatment Detail (self-reflection and planning)   Behavioral Health Interventions   Depression maintain safety precautions;monitor need to revise level of observation;maintain safe secure environment;provide emotional support;establish therapeutic relationship;assist with developing and utilizing healthy coping strategies;build upon strengths   Social and Therapeutic Interventions (Depression) encourage effective boundaries with peers;encourage participation in therapeutic groups and milieu activities

## 2017-08-21 PROCEDURE — 99232 SBSQ HOSP IP/OBS MODERATE 35: CPT | Performed by: PSYCHIATRY & NEUROLOGY

## 2017-08-21 PROCEDURE — 25000132 ZZH RX MED GY IP 250 OP 250 PS 637: Performed by: PSYCHIATRY & NEUROLOGY

## 2017-08-21 PROCEDURE — 12400007 ZZH R&B MH INTERMEDIATE UMMC

## 2017-08-21 RX ORDER — QUETIAPINE FUMARATE 50 MG/1
50-100 TABLET, FILM COATED ORAL
Status: DISCONTINUED | OUTPATIENT
Start: 2017-08-21 | End: 2017-08-25 | Stop reason: HOSPADM

## 2017-08-21 RX ORDER — QUETIAPINE FUMARATE 50 MG/1
50-100 TABLET, FILM COATED ORAL AT BEDTIME
Status: DISCONTINUED | OUTPATIENT
Start: 2017-08-21 | End: 2017-08-21

## 2017-08-21 RX ADMIN — MELATONIN TAB 3 MG 6 MG: 3 TAB at 21:48

## 2017-08-21 RX ADMIN — TRAZODONE HYDROCHLORIDE 50 MG: 50 TABLET ORAL at 21:48

## 2017-08-21 RX ADMIN — FOLIC ACID 1 MG: 1 TABLET ORAL at 09:05

## 2017-08-21 RX ADMIN — QUETIAPINE FUMARATE 100 MG: 50 TABLET, FILM COATED ORAL at 22:29

## 2017-08-21 RX ADMIN — MAGNESIUM HYDROXIDE 30 ML: 400 SUSPENSION ORAL at 20:53

## 2017-08-21 RX ADMIN — SERTRALINE HYDROCHLORIDE 50 MG: 50 TABLET ORAL at 09:05

## 2017-08-21 RX ADMIN — MULTIPLE VITAMINS W/ MINERALS TAB 1 TABLET: TAB at 09:05

## 2017-08-21 RX ADMIN — NICOTINE 1 PATCH: 14 PATCH, EXTENDED RELEASE TRANSDERMAL at 09:04

## 2017-08-21 ASSESSMENT — ACTIVITIES OF DAILY LIVING (ADL)
DRESS: STREET CLOTHES
ORAL_HYGIENE: INDEPENDENT
GROOMING: INDEPENDENT
ORAL_HYGIENE: INDEPENDENT
DRESS: INDEPENDENT

## 2017-08-21 NOTE — PROGRESS NOTES
Jessicar received call from Dr. Jennie Kearns ([phone]) [facility] appointed examiner who is requesting recommendations from treatment team. Writer stated she would discuss with Pt's doctor during team meeting this morning and get back to Dr. Schneider as soon as that is over.     Writer LVM for Dr. Jennie Kearns and informed her of treatment team's recommendations of a stay of commitment for Pt.

## 2017-08-21 NOTE — PROGRESS NOTES
Pt was visible in the milieu during this shift, social with peers and staff, and attending groups. Pt denies SI/SIB, depression, and anxiety. No changes in behavior nor demeanor.       08/20/17 1694   Behavioral Health   Hallucinations denies / not responding to hallucinations   Thinking distractable   Orientation person: oriented;place: oriented;date: oriented;time: oriented   Memory baseline memory   Insight poor   Judgement impaired   Eye Contact at examiner   Affect full range affect   Mood mood is calm   Physical Appearance/Attire appears stated age   Hygiene well groomed   Suicidality other (see comments)  (Denies)   Self Injury other (see comment)  (Denies)   Elopement (None observed)   Activity other (see comment)  (Visible in milieu, social with peers and staff)   Speech clear;coherent   Medication Sensitivity no stated side effects   Psychomotor / Gait balanced;steady   Safety   Elopement status 15;room away from unit doors;no shoes;signs posted on unit entrance / exit doors   Psycho Education   Type of Intervention 1:1 intervention   Response participates, initiates socially appropriate   Hours 0.5   Treatment Detail (Self-Refletion & Planning, MH Check-in)   Activities of Daily Living   Hygiene/Grooming independent   Oral Hygiene independent   Dress street clothes   Laundry with supervision   Room Organization independent   Behavioral Health Interventions   Depression maintain safety precautions;monitor need to revise level of observation;maintain safe secure environment;establish therapeutic relationship;provide emotional support   Social and Therapeutic Interventions (Depression) encourage socialization with peers;encourage effective boundaries with peers;encourage participation in therapeutic groups and milieu activities

## 2017-08-21 NOTE — PROGRESS NOTES
[facility], [place]   Psychiatric Progress Note        Interim History:   The patient's care was discussed with the treatment team during the daily team meeting and/or staff's chart notes were reviewed.  Staff report patient has been rating depression or anxiety at 2/10. Denies SI or SIB.     The patient is more cooperative today. Mood is good. Eating well. Still not sleeping well. Discussed options and patient has never tried Seroquel. Denies SI or HI. Denies withdrawal symptoms.          Medications:       sertraline  50 mg Oral Daily     nicotine   Transdermal Q8H     nicotine   Transdermal Daily     nicotine  1 patch Transdermal Daily     folic acid  1 mg Oral Daily     multivitamin, therapeutic with minerals  1 tablet Oral Daily          Allergies:   No Known Allergies       Labs:   No results found for this or any previous visit (from the past 24 hour(s)).       Psychiatric Examination:     /78  Pulse 96  Temp 98.3  F (36.8  C) (Oral)  Resp 16  Ht 1.829 m (6')  Wt 79.8 kg (176 lb)  BMI 23.87 kg/m2  Weight is 176 lbs 0 oz  Body mass index is 23.87 kg/(m^2).  Orthostatic Vitals       Most Recent      Sitting Orthostatic /84 08/09 2047    Sitting Orthostatic Pulse (bpm) 78 08/09 2047    Standing Orthostatic /124 08/09 1209    Standing Orthostatic Pulse (bpm) 100 08/09 1209            Appearance: awake, alert and adequately groomed  Attitude:  more cooperative  Eye Contact:  good  Mood: good  Affect:  intensity is blunted  Speech:  clear, coherent  Psychomotor Behavior:  no evidence of tardive dyskinesia, dystonia, or tics  Throught Process:  concrete  Associations:  no loose associations  Thought Content:  no evidence of suicidal ideation or homicidal ideation and no evidence of psychotic thought  Insight:  partial  Judgement:  fair  Oriented to:  time, person, and place  Attention Span and Concentration:  intact  Recent and Remote Memory:  fair          Precautions:     Behavioral Orders   Procedures     Code 1 - Restrict to Unit     Elopement precautions     Routine Programming     As clinically indicated     Status 15     Every 15 minutes.     Suicide precautions          DIagnoses:     Axis I:  Major depressive disorder, recurrent, severe with the possibility of psychosis.   Alcohol use disorder, severe dependence.   Nicotine use disorder.   Axis II:  Deferred.   Axis III:  Recent diagnosis of lung cancer.          Plan:     1) Filed petition for MICD commitment. Patient on court hold. Had preliminary hearing last week. Final hearing on 8/23/17.   2) Continue Zoloft 50mg Qday and melatonin 3-6mg Qhs PRN.   3) Start Seroquel 50-100mg Qhs PRN insomnia.   4) Disposition to be determined. Patient to show improvement in terms of mood prior to discharge.

## 2017-08-21 NOTE — PROGRESS NOTES
08/21/17 1506   Behavioral Health   Hallucinations appears responding   Thinking poor concentration   Orientation time: oriented;date: oriented;place: oriented;person: oriented   Memory baseline memory   Insight poor   Judgement impaired   Eye Contact at examiner   Affect blunted, flat   Mood mood is calm   Physical Appearance/Attire attire appropriate to age and situation   Hygiene neglected grooming - unclean body, hair, teeth   Suicidality other (see comments)  (denied)   Self Injury other (see comment)  (denied)   Elopement (nothing was observed)   Activity other (see comment)  (visible in the milieu )   Speech coherent;clear   Medication Sensitivity no stated side effects;no observed side effects   Psychomotor / Gait steady;balanced   Psycho Education   Type of Intervention 1:1 intervention   Response participates, initiates socially appropriate   Hours 0.5   Treatment Detail (check in )   Activities of Daily Living   Hygiene/Grooming with supervision;handwashing   Oral Hygiene independent   Dress street clothes   Room Organization independent   Behavioral Health Interventions   Depression maintain safety precautions;monitor need to revise level of observation;maintain safe secure environment   Social and Therapeutic Interventions (Depression) encourage socialization with peers;encourage effective boundaries with peers;encourage participation in therapeutic groups and milieu activities     Pt was visible in the milieu, but refused to attend groups. Pt denied SI/SIB, depressed and anxious.

## 2017-08-22 PROCEDURE — 99232 SBSQ HOSP IP/OBS MODERATE 35: CPT | Performed by: PSYCHIATRY & NEUROLOGY

## 2017-08-22 PROCEDURE — 12400007 ZZH R&B MH INTERMEDIATE UMMC

## 2017-08-22 PROCEDURE — 25000132 ZZH RX MED GY IP 250 OP 250 PS 637: Performed by: PSYCHIATRY & NEUROLOGY

## 2017-08-22 RX ORDER — TRAZODONE HYDROCHLORIDE 100 MG/1
100 TABLET ORAL
Status: DISCONTINUED | OUTPATIENT
Start: 2017-08-22 | End: 2017-08-25 | Stop reason: HOSPADM

## 2017-08-22 RX ORDER — SENNOSIDES 8.6 MG
2 TABLET ORAL 2 TIMES DAILY PRN
Status: DISCONTINUED | OUTPATIENT
Start: 2017-08-22 | End: 2017-08-25 | Stop reason: HOSPADM

## 2017-08-22 RX ADMIN — NICOTINE 1 PATCH: 14 PATCH, EXTENDED RELEASE TRANSDERMAL at 09:00

## 2017-08-22 RX ADMIN — FOLIC ACID 1 MG: 1 TABLET ORAL at 09:01

## 2017-08-22 RX ADMIN — MULTIPLE VITAMINS W/ MINERALS TAB 1 TABLET: TAB at 09:01

## 2017-08-22 RX ADMIN — MAGNESIUM HYDROXIDE 30 ML: 400 SUSPENSION ORAL at 21:00

## 2017-08-22 RX ADMIN — SERTRALINE HYDROCHLORIDE 50 MG: 50 TABLET ORAL at 09:01

## 2017-08-22 NOTE — PROGRESS NOTES
"[facility], [place]   Psychiatric Progress Note        Interim History:   The patient's care was discussed with the treatment team during the daily team meeting and/or staff's chart notes were reviewed.  Staff report patient has been rating depression or anxiety at 0/10. Denies SI or SIB.     The patient is cooperative today. Says that he hasn't had a bowel movement in 9 days but doesn't want to do an enema. Says that he has some pain but has gone longer than this in the past. Mood is good. Slept \"a little bit\" with the Seroquel. Denies SI or HI. Looking forward to court tomorrow as he feels he will be discharged.          Medications:       sertraline  50 mg Oral Daily     nicotine   Transdermal Q8H     nicotine   Transdermal Daily     nicotine  1 patch Transdermal Daily     folic acid  1 mg Oral Daily     multivitamin, therapeutic with minerals  1 tablet Oral Daily          Allergies:   No Known Allergies       Labs:   No results found for this or any previous visit (from the past 24 hour(s)).       Psychiatric Examination:     /78  Pulse 96  Temp 98  F (36.7  C)  Resp 16  Ht 1.829 m (6')  Wt 81.2 kg (179 lb)  BMI 24.28 kg/m2  Weight is 179 lbs 0 oz  Body mass index is 24.28 kg/(m^2).  Orthostatic Vitals       Most Recent      Sitting Orthostatic /84 08/09 2047    Sitting Orthostatic Pulse (bpm) 78 08/09 2047    Standing Orthostatic /124 08/09 1209    Standing Orthostatic Pulse (bpm) 100 08/09 1209            Appearance: awake, alert and adequately groomed  Attitude:  more cooperative  Eye Contact:  good  Mood: good  Affect:  intensity is blunted  Speech:  clear, coherent  Psychomotor Behavior:  no evidence of tardive dyskinesia, dystonia, or tics  Throught Process:  goal oriented  Associations:  no loose associations  Thought Content:  no evidence of suicidal ideation or homicidal ideation and no evidence of psychotic thought  Insight:  partial  Judgement:  " fair  Oriented to:  time, person, and place  Attention Span and Concentration:  intact  Recent and Remote Memory:  fair         Precautions:     Behavioral Orders   Procedures     Code 1 - Restrict to Unit     Elopement precautions     Routine Programming     As clinically indicated     Status 15     Every 15 minutes.     Suicide precautions          DIagnoses:     Axis I:  Major depressive disorder, recurrent, severe with the possibility of psychosis.   Alcohol use disorder, severe dependence.   Nicotine use disorder.   Axis II:  Deferred.   Axis III:  Recent diagnosis of lung cancer.          Plan:     1) Filed petition for MICD commitment. Patient on court hold. Had preliminary hearing last week. Final hearing on 8/23/17.   2) Continue Zoloft 50mg Qday and melatonin 3-6mg Qhs PRN.   3) Continue Seroquel 50-100mg Qhs PRN insomnia.   4) Disposition to be determined. Patient to show improvement in terms of mood prior to discharge.

## 2017-08-22 NOTE — PLAN OF CARE
Problem: Depressive Symptoms  Goal: Depressive Symptoms  Signs and symptoms of listed problems will be absent or manageable.   Patient will participate in 50% of groups offered in Milieu  Patient will remain free from SIB on unit  Patient will verbalize absence of suicidal thoughts and self harm thoughts   Outcome: No Change  Pt wants to discharge as soon as possible. Pt has court tomorrow and is hoping to discharge after court.  Pt tells me he does not want to go to treatment and wants to discharge home.  Pt has been social with others. Did not attend group.  Pt denies SI and SIB.  Appetite and sleep are good.   Pt refuses to wear scrubs and does not have any shoes on.  Pt states he has not had a BM in 9 days.  Writer encouraged pt to use a suppository and pt refused.  Was updated.  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  Pt refused lunch and was irritable at lunch time

## 2017-08-22 NOTE — PROGRESS NOTES
"Pt became agitated in milieu after other patients were redirected for inappropriate speech. Began to say \"My heart is about to beat out of my chest! I feel like I'm going to have a fucking heart attack.\" /104 P 112. When asked why he was anxious he refused to say. Stated \"I'm not anxious\" and kept watching TV. When offered PRN medication for anxiety he stated \"Just let me flatline and die.\" Pt was again encouraged to take PRN medication or talk with staff about what was bothering him but he refused.   "

## 2017-08-22 NOTE — PROGRESS NOTES
Pt wants to discharge as soon as possible. Pt has court tomorrow and is hoping to discharge after court.  Pt tells me he does not want to go to treatment and wants to discharge home.  Pt has been social with others. Did not attend group.  Pt denies SI and SIB.  Appetite and sleep are good.   Pt refuses to wear scrubs and does not have any shoes on.  Pt states he has not had a BM in 9 days.  Writer encouraged pt to use a suppository and pt refused.  Was updated.  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  Pt refused lunch and was irritable at lunch time.

## 2017-08-22 NOTE — PLAN OF CARE
Problem: General Plan of Care (Inpatient Behavioral)  Goal: Individualization/Patient Specific Goal (IP Behavioral)  The patient and/or their representative will achieve their patient-specific goals related to the plan of care.    The patient-specific goals include:Illness Management Recovery model: Objectives    Patient will identify reason(s) for hospitalization from their perspective.  Patient will identify a minimum of three goals for discharge.  Patient will identify a minimum of three triggers that may increase their symptoms.  Patient will identify a minimum of three coping skills they can do to stay well.   Patient will identify their support system to demonstrate readiness for discharge.   Outcome: No Change  Illness Management Recovery model:  Wellness Strategies.     Patient identified the following actions/activities they can do to stay well.     1. Go to AA  2. Work with PO officer  3.work full time

## 2017-08-22 NOTE — PROGRESS NOTES
"Writer received return VM from Peg  ([phone]) who requested Writer obtain CHARLENE for [facility], so we can send Pt information over to them for intake.     Writer explained [facility] and asked Pt if he would sign CHARLENE so we could get information for intake over to them. Pt replied, \"hell no\" then asked for information about [facility].     Writer provided Pt with information about [facility] ([facility]) asked for him to think it over and sign CHARLENE if willing.   "

## 2017-08-22 NOTE — PROGRESS NOTES
Writer received VM from Peg Case with [facility] CD Review Team ([phone]) who stated Pt would be funded for MICD residential treatment by [facility], they are recommending [facility] who has IOP with lod[facility]     Writer discussed the information above with Pt who reported he does not believe he needs treatment, and indicated he will discuss this at his hearing tomorrow.     Writer WILLIAM for Peg Care at [facility], asked for a call back to discuss MICD treatment.

## 2017-08-23 PROCEDURE — 12400007 ZZH R&B MH INTERMEDIATE UMMC

## 2017-08-23 PROCEDURE — 90853 GROUP PSYCHOTHERAPY: CPT

## 2017-08-23 PROCEDURE — 25000132 ZZH RX MED GY IP 250 OP 250 PS 637: Performed by: NURSE PRACTITIONER

## 2017-08-23 PROCEDURE — 25000132 ZZH RX MED GY IP 250 OP 250 PS 637: Performed by: PSYCHIATRY & NEUROLOGY

## 2017-08-23 RX ORDER — LANOLIN ALCOHOL/MO/W.PET/CERES
3-6 CREAM (GRAM) TOPICAL
Qty: 60 TABLET | Refills: 1 | Status: ON HOLD | OUTPATIENT
Start: 2017-08-23 | End: 2017-11-29

## 2017-08-23 RX ORDER — TRAZODONE HYDROCHLORIDE 100 MG/1
100 TABLET ORAL
Qty: 60 TABLET | Refills: 1 | Status: ON HOLD | OUTPATIENT
Start: 2017-08-23 | End: 2017-11-29

## 2017-08-23 RX ORDER — QUETIAPINE FUMARATE 50 MG/1
50-100 TABLET, FILM COATED ORAL
Qty: 60 TABLET | Refills: 1 | Status: ON HOLD | OUTPATIENT
Start: 2017-08-23 | End: 2017-11-29

## 2017-08-23 RX ADMIN — FOLIC ACID 1 MG: 1 TABLET ORAL at 08:29

## 2017-08-23 RX ADMIN — MELATONIN TAB 3 MG 6 MG: 3 TAB at 21:32

## 2017-08-23 RX ADMIN — MULTIPLE VITAMINS W/ MINERALS TAB 1 TABLET: TAB at 08:29

## 2017-08-23 RX ADMIN — QUETIAPINE FUMARATE 100 MG: 50 TABLET, FILM COATED ORAL at 21:32

## 2017-08-23 RX ADMIN — SERTRALINE HYDROCHLORIDE 50 MG: 50 TABLET ORAL at 08:29

## 2017-08-23 RX ADMIN — TRAZODONE HYDROCHLORIDE 100 MG: 100 TABLET ORAL at 21:32

## 2017-08-23 RX ADMIN — OLANZAPINE 10 MG: 10 TABLET, FILM COATED ORAL at 08:31

## 2017-08-23 RX ADMIN — NICOTINE 1 PATCH: 14 PATCH, EXTENDED RELEASE TRANSDERMAL at 08:28

## 2017-08-23 ASSESSMENT — ACTIVITIES OF DAILY LIVING (ADL)
GROOMING: INDEPENDENT
ORAL_HYGIENE: INDEPENDENT
DRESS: INDEPENDENT
LAUNDRY: WITH SUPERVISION
LAUNDRY: WITH SUPERVISION
DRESS: INDEPENDENT
GROOMING: INDEPENDENT
ORAL_HYGIENE: INDEPENDENT

## 2017-08-23 NOTE — PROGRESS NOTES
Case Cheri note  Update 2:07pm:  Natanael called to say that he will review the documents.  They have a bed available for tomorrow (Thursday 8/24/17).    Update 12:52pm: Sent Pt.'s Rule 25 and supporting documentation, signed CHARLENE, Stay of Commitment paperwork and [facility] 's contact info (Kallie Case [facility] [phone]) to [facility] (Natanael in Admissions - [phone]/fax [phone]) for admissions consideration.      Update 11:57am: Kallie Case from Rule 25  from [facility] ([phone]) called to say that the patient has been placed on a stay of commitment.  She suggested that if the pt is not amenable to receiving care at [facility] due to the length of the program, he can be referred (and she is willing to fund) [facility] ([phone]/fax [phone]) High intensity or outpatient with lodging in [place] instead. We should talk to the pt and have him sign CHARLENE for program he wants to attend.      Nima Hernandez from [facility] Pre-Petition Screening ([phone]) called to say that he would like to obtain updated clinical information.  Faxed MAR, last two physician notes and the last RN note in preparation for pt.'s court appearance this morning.

## 2017-08-23 NOTE — PROGRESS NOTES
Pt requested Senna for constipation. Placed call to on call MD.  New order for Senna 2 tabs BID PRN and also requested increase in Trazodone to 100 mg HS PRN.  Will offer to pt when meds available.

## 2017-08-23 NOTE — PROGRESS NOTES
"Pt out in the lounge watching tv.  Approached pt to inform him that I obtained orders for Senna and increase in Trazodone for sleep.  Pt did not acknowledge this nurse and continued to look at the tv, not making any eye contact.  After aprox 1 min, pt stated, \"I am listening\", continuing to look at the tv.  Informed pt of meds that were available and asked when he would like them.  Pt stated, \"Now\".  Invited pt to med window for med administration.  Pt continued to sit out on the unit and watch TV, not attempting to obtain medications.  Will administer medication when pt requests.    "

## 2017-08-23 NOTE — PROGRESS NOTES
08/22/17 8274   Behavioral Health   Hallucinations denies / not responding to hallucinations   Thinking distractable   Orientation person: oriented;place: oriented;date: oriented;time: oriented   Memory baseline memory   Insight poor;denial of illness   Judgement impaired   Eye Contact at examiner   Affect irritable;angry   Mood irritable   Physical Appearance/Attire appears stated age   Hygiene other (see comment)  (fair)   Suicidality other (see comments)  (CHELSI (Pt declined check-in))   Self Injury other (see comment)  (CHELSI (Pt declined check-in))   Elopement Distress about legal situation (holds for mental health or criminal)   Activity refusal   Speech coherent;clear   Medication Sensitivity no stated side effects;no observed side effects   Psychomotor / Gait balanced;steady     Pt was visible in milieu. Pt's mood was irritable/angry and affect irritable. Pt refused check-in so SI/SIB was not able to be assessed. Pt did make a statement to an RN that may suggest suicidal ideation (see RN progress note from earlier in shift). No reports or observations of psychotic symptoms.

## 2017-08-23 NOTE — PROGRESS NOTES
"Pt out on the unit watching TV.  This nurse approached pt to check in with him.  Pt irritable, mood depressed.  Pt stated, \"You know I have an intestinal infection.  I haven't taken a crap for 10 days now.  I had this once before.\"  Asked pt for more specific information, such as if it was previously diagnosed, any medication that he had previously taken etc.  Pt stated, \"I don't know.  It doesn't matter.  I will deal with it tomorrow.\"  Pt reported, when asked about how he slept last noc, \"Like shit.  Tossed and turned all noc.  I need something to knock me out\".  Informed pt that I was unaware of any new sleep medication orders but will check on it for him.  Pt took Melatonin, Trazodone and Seroquel last noc for sleep.  Pt will request meds when he is ready for hs.  Pt also stated, \"I have court tomorrow and I am hoping I can go to court and head back to [place]\".  Reminded pt that he will need to return to [place] before discharging.  Pt stated, \"Oh I know, I have money here\".    Will offer meds for bowel movement when pt comes for sleep medication.  Encouraged increased activity and fluids.      "

## 2017-08-23 NOTE — PLAN OF CARE
"Problem: Depressive Symptoms  Goal: Depressive Symptoms  Signs and symptoms of listed problems will be absent or manageable.   Patient will participate in 50% of groups offered in Milieu  Patient will remain free from SIB on unit  Patient will verbalize absence of suicidal thoughts and self harm thoughts   Outcome: No Change    08/23/17 1345   Depressive Symptoms   Depressive Symptoms Assessed all   Depressive Symptoms Present mood;affect   48 hour nursing assessment:  Patient evaluation continues. Assessed mood,anxiety,thoughts and behavior. Is progressing towards goals. Encourage participation in groups and developing healthy coping skills. Will continue to assess. Patient denies auditory or visual  Hallucinations. Refer to daily team meeting notes for individualized plan of care.     Pt appeared irritable and agitated during morning medication administration. Pt left for court at 0830. PRN was given to pt to help calm pt. Pt stated his anxiety was 20/10 and denies depression. Pt stated he slept \"crappy.\"   Pt returned from court upset and agitated. Pt refused search. Unit manager did okay pt to come onto unit without search. Pt has been calm and cooperative. Pt has refused all groups. Pt appears tense and sad. CTC spoke with pt.  Writer approached pt in room while resting. Pt stated his frustration towards doctor. Pt feels he should not have been here for as long as he was. Pt stated he has a home and a job and feels the amount of time was unnecessary. Pt stated he does not want tx. Pt has been to tx in the past and stated he did not like it and it did not work. Writer empathized with pt and pt stated \"it doesn't matter.\"   Pt did not c/o constipation during this shift.       "

## 2017-08-24 PROCEDURE — 25000132 ZZH RX MED GY IP 250 OP 250 PS 637: Performed by: PSYCHIATRY & NEUROLOGY

## 2017-08-24 PROCEDURE — 12400007 ZZH R&B MH INTERMEDIATE UMMC

## 2017-08-24 PROCEDURE — 99232 SBSQ HOSP IP/OBS MODERATE 35: CPT | Performed by: PSYCHIATRY & NEUROLOGY

## 2017-08-24 PROCEDURE — 25000132 ZZH RX MED GY IP 250 OP 250 PS 637: Performed by: NURSE PRACTITIONER

## 2017-08-24 RX ADMIN — TRAZODONE HYDROCHLORIDE 100 MG: 100 TABLET ORAL at 21:49

## 2017-08-24 RX ADMIN — MULTIPLE VITAMINS W/ MINERALS TAB 1 TABLET: TAB at 08:40

## 2017-08-24 RX ADMIN — NICOTINE 1 PATCH: 14 PATCH, EXTENDED RELEASE TRANSDERMAL at 08:39

## 2017-08-24 RX ADMIN — MELATONIN TAB 3 MG 6 MG: 3 TAB at 21:49

## 2017-08-24 RX ADMIN — SERTRALINE HYDROCHLORIDE 50 MG: 50 TABLET ORAL at 08:40

## 2017-08-24 RX ADMIN — FOLIC ACID 1 MG: 1 TABLET ORAL at 08:39

## 2017-08-24 RX ADMIN — QUETIAPINE FUMARATE 100 MG: 50 TABLET, FILM COATED ORAL at 21:48

## 2017-08-24 ASSESSMENT — ACTIVITIES OF DAILY LIVING (ADL)
ORAL_HYGIENE: INDEPENDENT
DRESS: STREET CLOTHES
LAUNDRY: WITH SUPERVISION
ORAL_HYGIENE: INDEPENDENT
DRESS: INDEPENDENT
GROOMING: INDEPENDENT
GROOMING: INDEPENDENT

## 2017-08-24 NOTE — PROGRESS NOTES
"Sidney spent the whole shift in the lounge, watching tv. He declined to \"check in\", but stated he was not suicidal; he also stated that his depression was rated at a 0/10, and that his anxiety was \"even keel\" (he refused to rate his anxiety). He was tense and irritable and his affect was incongruent.     08/23/17 2131   Behavioral Health   Hallucinations denies / not responding to hallucinations   Thinking distractable   Orientation person: oriented;place: oriented;date: oriented;time: oriented   Memory baseline memory   Insight poor;denial of illness   Judgement impaired   Eye Contact staring  (no eye contact)   Affect blunted, flat;tense;irritable;incongruent  (\"not depressed\")   Mood irritable;mood is calm   Physical Appearance/Attire appears stated age   Hygiene other (see comment)  (adequate)   Suicidality other (see comments)  (denies)   Self Injury other (see comment)  (denies)   Elopement (none stated)   Activity withdrawn   Speech clear;coherent   Medication Sensitivity no stated side effects;no observed side effects   Psychomotor / Gait balanced;steady   Activities of Daily Living   Hygiene/Grooming independent   Oral Hygiene independent   Dress independent   Laundry with supervision   Room Organization independent   Activity   Activity Level of Assistance independent     "

## 2017-08-24 NOTE — PROGRESS NOTES
Anticipates discharge Friday to court ordered treatment, not attending unit programming, declined formal 1:1 communication.     08/24/17 1426   Behavioral Health   Hallucinations denies / not responding to hallucinations   Thinking intact   Orientation person: oriented;place: oriented;date: oriented   Memory baseline memory   Eye Contact at examiner   Affect full range affect   Mood mood is calm   Suicidality (none expressed)   Activity (situated in milieu)   Speech clear;coherent   Psychomotor / Gait balanced;steady   Psycho Education   Type of Intervention 1:1 intervention   Response refuses   Hours 0.5   Treatment Detail feedback   Activities of Daily Living   Hygiene/Grooming independent   Oral Hygiene independent   Dress street clothes   Laundry with supervision   Room Organization independent   Activity   Activity Level of Assistance independent

## 2017-08-24 NOTE — DISCHARGE INSTRUCTIONS
Behavioral Discharge Planning and Instructions      Summary:  You were admitted on 8/7/2017  For Depression and Substance Use Disorder.  You were treated by Dr. Gutierrez Yates MD and discharged on 08/25/2017 from [facility] to Substance Abuse Treatment Program [facility]     Main Diagnosis: Major depressive disorder, recurrent, severe with the possibility of psychosis.   Alcohol use disorder, severe dependence.   Nicotine use disorder.     Health Care Follow-up Appointments:   [place] [address]  Tel:  [phone]        Fax: [phone] HUC TO FAX DISCHARGE PAPERWORK     If no appointments scheduled, you are discharging with a 30 day supply of your medications, after you have settled at the treatment center, please make an appointment with your PCP or psychiatrist.   Attend all scheduled appointments with your outpatient providers. Call at least 24 hours in advance if you need to reschedule an appointment to ensure continued access to your outpatient providers.   Major Treatments, Procedures and Findings:  You were provided with: a psychiatric assessment, assessed for medical stability, medication evaluation and/or management, group therapy, CD evaluation/assessment and milieu management    Symptoms to Report: feeling more aggressive, increased confusion, losing more sleep, mood getting worse or thoughts of suicide    Early warning signs can include: increased depression or anxiety sleep disturbances increased thoughts or behaviors of suicide or self-harm  increased unusual thinking, such as paranoia or hearing voices    Safety and Wellness:  Take all medicines as directed.  Make no changes unless your doctor suggests them.      Follow treatment recommendations.  Refrain from alcohol and non-prescribed drugs.  Ask your support system to help you reduce your access to items that could harm yourself or others. If there is a concern for safety,  "call 911.    Resources:   Crisis Intervention: 943.574.5980 or 924-661-1208 (TTY: 432.841.7110).  Call anytime for help.  National Shepherd on Mental Illness (www.[place].brissa.org): 282.444.4200 or 945-656-7086.  Alcoholics Anonymous (www.alcoholics-anonymous.org): Check your phone book for your local chapter.  Suicide Awareness Voices of Education (SAVE) (www.save.org): 711-453-CETH (3858)  National Suicide Prevention Line (www.mentalhealthmn.org): 580-760-KZKZ (8350)  Mental Health Consumer/Survivor Network of [place] (www.mhcsn.net): 772.516.9877 or 311-774-5109  Mental Health Association of [place] (www.mentalhealth.org): 651.943.4632 or 571-407-9239  Self- Management and Recovery Training., Digiting-- Toll free: 759.339.6337  www.[employer].Fixetude  [facility] Crisis Response 285-629-3529  [facility] Crisis Response 079-374-3957  Text 4 Life: txt \"LIFE\" to 28662 for immediate support and crisis intervention  Crisis text line: Text \"START\" to 048-993. Free, confidential, 24/7.  Crisis Intervention: 115.375.6599 or 481-288-3029. Call anytime for help.   [facility] Crisis Team:  [phone]    The treatment team has appreciated the opportunity to work with you.     Please take care and make your recovery a daily recovery.   If you have any questions or concerns our unit number is [phone].  Thank you.       "

## 2017-08-24 NOTE — PROGRESS NOTES
Writer spoke with Natanael at [address] [place] [address] ([phone]) they can accept Pt tomorrow. Pt should go by cab by 11:00 am to [facility] [address] in [place], [place], and medications should go with him. Writer notified Pt, treatment team, and added information along with cab voucher to the brain board.

## 2017-08-24 NOTE — PROGRESS NOTES
[facility], [place]   Psychiatric Progress Note        Interim History:   The patient's care was discussed with the treatment team during the daily team meeting and/or staff's chart notes were reviewed.  Staff report patient has been rating depression or anxiety at 0/10. Denies SI or SIB.     The patient is cooperative today. Mood is good. Sleeping better. Denies SI or HI. Agreeable to go to CD treatment tomorrow.          Medications:       sertraline  50 mg Oral Daily     nicotine   Transdermal Q8H     nicotine   Transdermal Daily     nicotine  1 patch Transdermal Daily     folic acid  1 mg Oral Daily     multivitamin, therapeutic with minerals  1 tablet Oral Daily          Allergies:   No Known Allergies       Labs:   No results found for this or any previous visit (from the past 24 hour(s)).       Psychiatric Examination:     BP (!) 168/104  Pulse 112  Temp 97.7  F (36.5  C)  Resp 16  Ht 1.829 m (6')  Wt 79.4 kg (175 lb)  BMI 23.73 kg/m2  Weight is 175 lbs 0 oz  Body mass index is 23.73 kg/(m^2).  Orthostatic Vitals       Most Recent      Sitting Orthostatic /84 08/09 2047    Sitting Orthostatic Pulse (bpm) 78 08/09 2047    Standing Orthostatic /124 08/09 1209    Standing Orthostatic Pulse (bpm) 100 08/09 1209            Appearance: awake, alert and adequately groomed  Attitude:  more cooperative  Eye Contact:  good  Mood: good  Affect:  intensity is blunted  Speech:  clear, coherent  Psychomotor Behavior:  no evidence of tardive dyskinesia, dystonia, or tics  Throught Process:  goal oriented  Associations:  no loose associations  Thought Content:  no evidence of suicidal ideation or homicidal ideation and no evidence of psychotic thought  Insight:  partial  Judgement:  fair  Oriented to:  time, person, and place  Attention Span and Concentration:  intact  Recent and Remote Memory:  fair         Precautions:     Behavioral Orders   Procedures     Code 1 - Restrict  to Unit     Elopement precautions     Routine Programming     As clinically indicated     Status 15     Every 15 minutes.          DIagnoses:     Axis I:  Major depressive disorder, recurrent, severe with the possibility of psychosis.   Alcohol use disorder, severe dependence.   Nicotine use disorder.   Axis II:  Deferred.   Axis III:  Recent diagnosis of lung cancer.          Plan:     1) Filed petition for MICD commitment. Patient on stay of commitment.   2) Continue Zoloft 50mg Qday and melatonin 3-6mg Qhs PRN.   3) Continue Seroquel 50-100mg Qhs PRN insomnia.   4) Disposition to [facility] tomorrow. Medications ordered.

## 2017-08-25 VITALS
HEIGHT: 72 IN | RESPIRATION RATE: 16 BRPM | WEIGHT: 175 LBS | TEMPERATURE: 98.2 F | BODY MASS INDEX: 23.7 KG/M2 | HEART RATE: 112 BPM | SYSTOLIC BLOOD PRESSURE: 168 MMHG | DIASTOLIC BLOOD PRESSURE: 104 MMHG

## 2017-08-25 PROCEDURE — 99238 HOSP IP/OBS DSCHRG MGMT 30/<: CPT | Performed by: PSYCHIATRY & NEUROLOGY

## 2017-08-25 PROCEDURE — 90853 GROUP PSYCHOTHERAPY: CPT

## 2017-08-25 PROCEDURE — 25000132 ZZH RX MED GY IP 250 OP 250 PS 637: Performed by: PSYCHIATRY & NEUROLOGY

## 2017-08-25 RX ADMIN — NICOTINE 1 PATCH: 14 PATCH, EXTENDED RELEASE TRANSDERMAL at 09:43

## 2017-08-25 RX ADMIN — MULTIPLE VITAMINS W/ MINERALS TAB 1 TABLET: TAB at 09:43

## 2017-08-25 RX ADMIN — SERTRALINE HYDROCHLORIDE 50 MG: 50 TABLET ORAL at 09:43

## 2017-08-25 RX ADMIN — FOLIC ACID 1 MG: 1 TABLET ORAL at 09:43

## 2017-08-25 ASSESSMENT — ACTIVITIES OF DAILY LIVING (ADL)
DRESS: INDEPENDENT
LAUNDRY: WITH SUPERVISION
GROOMING: INDEPENDENT
ORAL_HYGIENE: INDEPENDENT

## 2017-08-25 NOTE — PLAN OF CARE
"Problem: Depressive Symptoms  Goal: Depressive Symptoms  Signs and symptoms of listed problems will be absent or manageable.   Patient will participate in 50% of groups offered in Milieu  Patient will remain free from SIB on unit  Patient will verbalize absence of suicidal thoughts and self harm thoughts   Outcome: Completed Date Met:  08/25/17  \"I'm doing fine, I want to get out of here.\"  Indicates mood continues to be stable, denies depression, anxiety or suicidal ideation.  States concentration and focus continue to be good, denies racing or ruminating thoughts \"except at night when I am trying to get to sleep\".  Denies side effects from medication, \"the antidepressant hasn't had time to start to work yet\".  States has already gotten in touch with AA sponsor and plans to attend 12 step groups upon completion of CD treatment.  Also plans to continue to work with therapist and psychiatrist to find the correct antidepressant and antianxiety agents.  Also wants to return to work and will keep time structured.  Has attended and participated in programming while hospitalized as well as been social with peers.  Left unit for discharge at 1040 per cab to go to [facility] in [facility], with belongings, instructions and medications.      "

## 2017-08-25 NOTE — DISCHARGE SUMMARY
"Psychiatric Discharge Summary    Sidney Varghese MRN# [id number]   Age: 52 year old YOB: 1965     Date of Admission:  8/7/2017  Date of Discharge:  8/25/2017 10:51 AM  Admitting Physician:  Gutierrez Yates MD  Discharge Physician:  Gutierrez Yates MD          Event Leading to Hospitalization:   HISTORY OF PRESENT ILLNESS:  Sidney Varghese is a 52-year-old white male with a history of depression and alcohol use disorder who was admitted after calling his substance abuse counselor and saying that he was going to drink a lead additive and bury himself in a hole in the ground.  The patient denies that he was having any suicidal thoughts.  Denies any thoughts of suicide now.  He does say he has been a little bit depressed.  Our records indicate there were guns in the home that he lives with his parents, but he says that he does not have access to those.  The guns are all locked up.  He says that he recently finished treatment at [facility], did a 28-day stay there and was sober for 2 weeks.  He is unsure why he relapsed.  Does not want to be on naltrexone or Antabuse.  He does not feel that he needs an antidepressant.  Says his primary issue is with sleep.  Discussed options including Remeron, which he thinks he has at home and doxepin.  He said that nothing works better than alcohol.  Does endorse auditory and visual hallucinations, says that their house is haunted by a ghost of a man from he said the 50s.  He has a pinstriped suit and a top hat.  He said his mother has seen the ghost as well, but he said that he is safe at home and wants to return home now.  Is agreeable to have this provider speak to his parents.  I did speak to his father Contreras at [phone].  He says the patient had been drinking \"an awful lot\" and says that the patient does not communicate with very many people.  Said that he did speak to his  and Contreras and his wife feel that the patient was " scared that he would go to half-way because he recently failed a urine drug screen and knows he would fail another one.  They are unsure if this last CD treatment was court ordered.  Does say that the guns are not all locked up.  Says that as far he knows he patient has never attempted suicide but has talked about it and does believe he has some legal issues where he could end up in half-way.        See Admission note by Gutierrez Yates MD for additional details.          DIagnoses:     Axis I:  Major depressive disorder, recurrent, severe with the possibility of psychosis.   Alcohol use disorder, severe dependence.   Nicotine use disorder.   Axis II:  Deferred.   Axis III:  Recent diagnosis of lung cancer.          Labs:          Lab Results   Component Value Date     08/07/2017    Lab Results   Component Value Date    CHLORIDE 105 08/07/2017    Lab Results   Component Value Date    BUN 10 08/07/2017      Lab Results   Component Value Date    POTASSIUM 3.7 08/07/2017    Lab Results   Component Value Date    CO2 27 08/07/2017    Lab Results   Component Value Date    CR 0.68 08/07/2017        Lab Results   Component Value Date    WBC 4.3 08/07/2017    HGB 14.0 08/07/2017    HCT 41.5 08/07/2017    MCV 98 08/07/2017     08/07/2017     Lab Results   Component Value Date    AST 60 (H) 08/07/2017    ALT 39 08/07/2017    ALKPHOS 106 08/07/2017    BILITOTAL 0.4 08/07/2017     Lab Results   Component Value Date    TSH 1.77 10/05/2015              Consults:   Consultation during this admission received from internal medicine:    Assessment and Recommendations:   Sidney Varghese is a 52 year old male with a history of alcohol dependency and depression admitted for suicidal ideation.       # Depression, SI - Management per Psychiatry     # Alcohol dependency - Patient reports long term history of ETOH use with multiple attempts at treatment.  No hx of withdrawal seizures.  Noted to be hypertensive to 170-180s  systolic today. LFTs wnl.   - Continue MSSA protocol  - Continue withdrawal precautions   - Hydralazine PRN for SBP > 160     # Questionable dx of lung CA - Reviewed records with patient.  Most recent CT C/A/P on 4/23/2015 at [facility] shows 2-3mm pulmonary nodule in the R lung apex.  Pt most recently had CXR on 6/15/17 with no acute findings, and no comment on any nodules.  Discussed with patient and advised him to follow up with his PCP for referral for further imaging.   - Recommend follow up with PCP for OP referral for CT chest      Medicine will sign off, no further recommendations at this time.  Please feel free to reconsult if patient's symptoms worsen or if new problems arise.  Thank you for this consult.           Hospital Course:   Sidney Varghese was admitted to [facility] with attending Gutierrez Yates MD on a 72 hour mental health hold. The patient was placed under status 15 (15 minute checks) to ensure patient safety.   MSSA protocol was initiated due to the patient's history of alcohol abuse and concern for withdrawal symptoms.  CBC, BMP and utox obtained. A petition for commitment was filed and the patient was placed on a stay.     The patient had not been taking any medications as an outpatient. His Zoloft and melatonin were restarted to good effect. Seroquel was added for insomnia and anxiety as well.     Sidney Varghese did participate in groups and was visible in the milieu.     The patient's symptoms of withdrawal and depression improved.     Sidney Varghese was released to  treatment. At the time of discharge Sidney Varghese was determined to not be a danger to himself or others. At the current time of discharge, the patient does not meet criteria for involuntary hospitalization. On the day of discharge, the patient reports that they do not have suicidal or homicidal ideation and would never hurt themselves or others. Steps taken to minimize risk include: assessing patient s behavior and  thought process daily during hospital stay, discharging patient with adequate plan for follow up for mental and physical health and discussing safety plan of returning to the hospital should the patient ever have thoughts of harming themselves or others. Therefore, based on all available evidence including the factors cited above, the patient does not appear to be at imminent risk for self-harm, and is appropriate for outpatient level of care.            Discharge Medications:     Current Discharge Medication List      START taking these medications    Details   melatonin 3 MG tablet Take 1-2 tablets (3-6 mg) by mouth nightly as needed for sleep  Qty: 60 tablet, Refills: 1      sertraline (ZOLOFT) 50 MG tablet Take 1 tablet (50 mg) by mouth daily  Qty: 30 tablet, Refills: 1      traZODone (DESYREL) 100 MG tablet Take 1 tablet (100 mg) by mouth nightly as needed for sleep  Qty: 60 tablet, Refills: 1      QUEtiapine (SEROQUEL) 50 MG tablet Take 1-2 tablets ( mg) by mouth nightly as needed (insomnia)  Qty: 60 tablet, Refills: 1                  Psychiatric Examination:   Appearance:  awake, alert and adequately groomed  Attitude:  cooperative  Eye Contact:  fair  Mood:  good  Affect:  mood congruent  Speech:  clear, coherent  Psychomotor Behavior:  no evidence of tardive dyskinesia, dystonia, or tics  Thought Process:  linear  Associations:  no loose associations  Thought Content:  no evidence of suicidal ideation or homicidal ideation and no evidence of psychotic thought  Insight:  fair  Judgment:  fair  Oriented to:  time, person, and place  Attention Span and Concentration:  intact  Recent and Remote Memory:  intact  Language: Able to read and write  Fund of Knowledge: appropriate  Muscle Strength and Tone: normal  Gait and Station: Normal         Discharge Plan:   Continue medications as above.     Health Care Follow-up Appointments:   [address]  [address]  Tel:  [phone]        Fax: [phone] HUC TO FAX DISCHARGE PAPERWORK      If no appointments scheduled, you are discharging with a 30 day supply of your medications, after you have settled at the treatment center, please make an appointment with your PCP or psychiatrist.   Attend all scheduled appointments with your outpatient providers. Call at least 24 hours in advance if you need to reschedule an appointment to ensure continued access to your outpatient providers.   Major Treatments, Procedures and Findings:  You were provided with: a psychiatric assessment, assessed for medical stability, medication evaluation and/or management, group therapy, CD evaluation/assessment and milieu management     Symptoms to Report: feeling more aggressive, increased confusion, losing more sleep, mood getting worse or thoughts of suicide     Early warning signs can include: increased depression or anxiety sleep disturbances increased thoughts or behaviors of suicide or self-harm  increased unusual thinking, such as paranoia or hearing voices     Safety and Wellness:  Take all medicines as directed.  Make no changes unless your doctor suggests them.      Follow treatment recommendations.  Refrain from alcohol and non-prescribed drugs.  Ask your support system to help you reduce your access to items that could harm yourself or others. If there is a concern for safety, call 911.     Resources:   Crisis Intervention: 313.100.8789 or 531-572-2271 (TTY: 637.647.8655).  Call anytime for help.  [place] on [place]ness (www.mn.brissa.org): 262.981.3263 or 401-258-5026.  Alcoholics Anonymous (www.alcoholics-anonymous.org): Check your phone book for your local chapter.  Suicide Awareness Voices of Education (SAVE) (www.save.org): 676-295-SYPE (3383)  National Suicide Prevention Line (www.mentalhealthmn.org): 645-306-ZVUZ (6595)  Mental Health Consumer/Survivor Network of [place] (www.mhcsn.net): 550.534.3254 or  "088-571-3510  Mental Health Association of [place] (www.mentalhealth.org): 596.288.8590 or 530-924-0913  Self- Management and Recovery Training., SMART-- Toll free: 732.706.3087  www.[employer]  [place] Crisis Response 204-013-6560  [facility] Crisis Response 919-703-7922  Text 4 Life: txt \"LIFE\" to 29343 for immediate support and crisis intervention  Crisis text line: Text \"START\" to 659-665. Free, confidential, 24/7.  Crisis Intervention: 195.585.6039 or 311-416-0591. Call anytime for help.   [place] [facility] Crisis Team:  [phone]    Attestation:  The patient was seen and evaluated by me. I spent less than 30 minutes on discharge day activities. Gutierrez Yates MD    "

## 2017-11-22 ENCOUNTER — TRANSFERRED RECORDS (OUTPATIENT)
Dept: HEALTH INFORMATION MANAGEMENT | Facility: CLINIC | Age: 52
End: 2017-11-22

## 2017-11-22 ENCOUNTER — TELEPHONE (OUTPATIENT)
Dept: BEHAVIORAL HEALTH | Facility: CLINIC | Age: 52
End: 2017-11-22

## 2017-11-22 ENCOUNTER — HOSPITAL ENCOUNTER (INPATIENT)
Facility: CLINIC | Age: 52
LOS: 19 days | Discharge: PSYCHIATRIC OR SUBSTANCE USE FACILITY | DRG: 885 | End: 2017-12-11
Attending: PSYCHIATRY & NEUROLOGY | Admitting: PSYCHIATRY & NEUROLOGY
Payer: COMMERCIAL

## 2017-11-22 PROBLEM — F19.20 SUBSTANCE DEPENDENCE (H): Status: ACTIVE | Noted: 2017-11-22

## 2017-11-22 PROCEDURE — 12400007 ZZH R&B MH INTERMEDIATE UMMC

## 2017-11-22 RX ORDER — HYDROXYZINE HYDROCHLORIDE 25 MG/1
25-50 TABLET, FILM COATED ORAL EVERY 4 HOURS PRN
Status: DISCONTINUED | OUTPATIENT
Start: 2017-11-22 | End: 2017-12-11 | Stop reason: HOSPADM

## 2017-11-22 RX ORDER — OLANZAPINE 10 MG/2ML
10 INJECTION, POWDER, FOR SOLUTION INTRAMUSCULAR
Status: DISCONTINUED | OUTPATIENT
Start: 2017-11-22 | End: 2017-12-11 | Stop reason: HOSPADM

## 2017-11-22 RX ORDER — IBUPROFEN 200 MG
400 TABLET ORAL EVERY 6 HOURS PRN
Status: DISCONTINUED | OUTPATIENT
Start: 2017-11-22 | End: 2017-12-11 | Stop reason: HOSPADM

## 2017-11-22 RX ORDER — LANOLIN ALCOHOL/MO/W.PET/CERES
3-6 CREAM (GRAM) TOPICAL
Status: DISCONTINUED | OUTPATIENT
Start: 2017-11-22 | End: 2017-11-28

## 2017-11-22 RX ORDER — TRAZODONE HYDROCHLORIDE 100 MG/1
100 TABLET ORAL
Status: DISCONTINUED | OUTPATIENT
Start: 2017-11-22 | End: 2017-11-28

## 2017-11-22 RX ORDER — LANOLIN ALCOHOL/MO/W.PET/CERES
100 CREAM (GRAM) TOPICAL DAILY
Status: DISCONTINUED | OUTPATIENT
Start: 2017-11-23 | End: 2017-11-24

## 2017-11-22 RX ORDER — BISACODYL 10 MG
10 SUPPOSITORY, RECTAL RECTAL DAILY PRN
Status: DISCONTINUED | OUTPATIENT
Start: 2017-11-22 | End: 2017-12-11 | Stop reason: HOSPADM

## 2017-11-22 RX ORDER — QUETIAPINE FUMARATE 200 MG/1
200 TABLET, FILM COATED ORAL
Status: DISCONTINUED | OUTPATIENT
Start: 2017-11-22 | End: 2017-11-28

## 2017-11-22 RX ORDER — DIAZEPAM 5 MG
5-20 TABLET ORAL EVERY 30 MIN PRN
Status: DISCONTINUED | OUTPATIENT
Start: 2017-11-22 | End: 2017-11-24

## 2017-11-22 RX ORDER — ALBUTEROL SULFATE 90 UG/1
2 AEROSOL, METERED RESPIRATORY (INHALATION) 4 TIMES DAILY PRN
COMMUNITY
End: 2021-06-03

## 2017-11-22 RX ORDER — OLANZAPINE 10 MG/1
10 TABLET ORAL
Status: DISCONTINUED | OUTPATIENT
Start: 2017-11-22 | End: 2017-12-11 | Stop reason: HOSPADM

## 2017-11-22 RX ORDER — MULTIPLE VITAMINS W/ MINERALS TAB 9MG-400MCG
1 TAB ORAL DAILY
Status: DISCONTINUED | OUTPATIENT
Start: 2017-11-23 | End: 2017-11-24

## 2017-11-22 RX ORDER — FOLIC ACID 1 MG/1
1 TABLET ORAL DAILY
Status: DISCONTINUED | OUTPATIENT
Start: 2017-11-23 | End: 2017-11-24

## 2017-11-22 RX ORDER — ALUMINA, MAGNESIA, AND SIMETHICONE 2400; 2400; 240 MG/30ML; MG/30ML; MG/30ML
30 SUSPENSION ORAL EVERY 4 HOURS PRN
Status: DISCONTINUED | OUTPATIENT
Start: 2017-11-22 | End: 2017-12-11 | Stop reason: HOSPADM

## 2017-11-22 RX ORDER — ALBUTEROL SULFATE 90 UG/1
2 AEROSOL, METERED RESPIRATORY (INHALATION) 4 TIMES DAILY PRN
Status: DISCONTINUED | OUTPATIENT
Start: 2017-11-22 | End: 2017-12-11 | Stop reason: HOSPADM

## 2017-11-22 NOTE — IP AVS SNAPSHOT
MRN:[id number]                      After Visit Summary   11/22/2017    Sidney Varghese    MRN: [id number]           Thank you!     Thank you for choosing [facility] for your care. Our goal is always to provide you with excellent care.        Patient Information     Date Of Birth          1965        Designated Caregiver       Most Recent Value    Caregiver    Will someone help with your care after discharge? -- [unable to assess]      About your hospital stay     You were admitted on:  November 22, 2017 You last received care in the:  [facility]    You were discharged on:  December 11, 2017       Who to Call     For medical emergencies, please call 911.  For non-urgent questions about your medical care, please call your primary care provider or clinic, [phone]          Attending Provider     Provider Specialty    Karson Hartman MD Psychiatry       Primary Care Provider Office Phone # Fax #    [facility] [phone]      Further instructions from your care team        Behavioral Discharge Planning and Instructions      Summary:  You were admitted on 11/22/2017  due to violation of your commitment and substance Use Disorder.  You were treated by Beth Spencer NP and discharged on 12/11/2017 from [facility] to chemical dependency treatment at [facility].    You were committed to the [facility] on 8/23/2017 and you were discharged on a stay of commitment which was revoked 11/22/2017. You are now  being discharged on a Provisional Discharge Agreement which shall remain in effect for the duration of the Commitment which expires on 5/22/2018.         Principal Diagnosis:   Major depressive disorder, moderate, recurrent  Panic disorder  Alcohol use disorder, severe  Nicotine use disorder    Health Care Follow-up Appointments:     You are being discharged to [address] [address]  [place]  at [facility]  [phone]. [place] and [place] are also requesting we fax current MAR, Provisional Discharge, and H&P to [place] upon d/c. Fax number: [phone].      :  Brenda Blackburn from [facility] Phone: [phone]  Fax: [phone]     You are discharging with a 30 day supply of your medications, after you have settled at the treatment center, please make an appointment with your PCP or psychiatrist.   Primary Care Provider is:   [facility]  [address], [place] [address]  Phone: [phone]  Fax: [phone]    Attend all scheduled appointments with your outpatient providers. Call at least 24 hours in advance if you need to reschedule an appointment to ensure continued access to your outpatient providers.   Major Treatments, Procedures and Findings:  You were provided with: a psychiatric assessment, medication evaluation and/or management, group therapy, CD evaluation/assessment and milieu management    Symptoms to Report: feeling more aggressive, increased confusion, losing more sleep, mood getting worse or thoughts of suicide    Early warning signs can include: increased depression or anxiety sleep disturbances increased thoughts or behaviors of suicide or self-harm  increased unusual thinking, such as paranoia or hearing voices    Safety and Wellness:  Take all medicines as directed.  Make no changes unless your doctor suggests them.      Follow treatment recommendations.  Refrain from alcohol and non-prescribed drugs.  If there is a concern for safety, call 541.    Resources:   [facility] Crisis: 129.603.1183    National Valatie on Mental Illness (www.[place].brissa.org): 218.477.1457 or 516-433-9380.  Alcoholics Anonymous (www.alcoholics-anonymous.org): Check your phone book for your local chapter.  Suicide Awareness Voices of Education (SAVE) (www.save.org): 385-227-DGPJ (8824)  National Suicide Prevention Line (www.mentalhealthmn.org):  "898-672-UCWM (5054)  Mental Health Consumer/Survivor Network of [place] (www.mhcsn.net): 239.806.8111 or 751-514-8137  Mental Health Association of [place] (www.mentalhealth.org): 966.373.5333 or 079-573-6882  Self- Management and Recovery Training., SMART-- Toll free: 306.816.6847  www.Row44  Text 4 Life: txt \"LIFE\" to 81793 for immediate support and crisis intervention  Crisis text line: Text \"START\" to 944-756. Free, confidential, .  Crisis Intervention: 879.422.9803 or 244-409-7122. Call anytime for help.     The treatment team has appreciated the opportunity to work with you.     If you have any questions or concerns our unit number is [phone]          Pending Results     No orders found from 2017 to 2017.            Admission Information     Date & Time Provider Department Dept. Phone    2017 Karson Hartman MD [facility] [phone]      Your Vitals Were     Blood Pressure Pulse Temperature Respirations Height Weight    137/91 (BP Location: Left arm) 76 97.8  F (36.6  C) (Oral) 16 1.778 m (5' 10\") 86.2 kg (190 lb)    BMI (Body Mass Index)                   27.26 kg/m2           MyChart Information     [employer] lets you send messages to your doctor, view your test results, renew your prescriptions, schedule appointments and more. To sign up, go to [email or web address] . Click on \"Log in\" on the left side of the screen, which will take you to the Welcome page. Then click on \"Sign up Now\" on the right side of the page.     You will be asked to enter the access code listed below, as well as some personal information. Please follow the directions to create your username and password.     Your access code is: [email or web address]  Expires: 3/11/2018 12:06 PM     Your access code will  in 90 days. If you need help or a new code, please call your [facility] or [phone].        Care EveryWhere ID     This is your Care EveryWhere ID. This could be used by other organizations to " access your [facility] medical records  [id number]        Equal Access to Services     NAYA FLETCHER : Hadii aad ku hadjohnfiorella Simon, wapatrickda ananda, qaybta karhinaharesh amezquita, florencio alvaresjoannarayan montes. So [facility] [phone].    ATENCIÓN: Si habla español, tiene a holbrook disposición servicios gratuitos de asistencia lingüística. Llame al [phone].    We comply with applicable federal civil rights laws and [place] laws. We do not discriminate on the basis of race, color, national origin, age, disability, sex, sexual orientation, or gender identity.               Review of your medicines      START taking        Dose / Directions    FLUoxetine 40 MG capsule   Commonly known as:  PROzac        Dose:  40 mg   Take 1 capsule (40 mg) by mouth daily   Quantity:  30 capsule   Refills:  1       hydrOXYzine 50 MG tablet   Commonly known as:  ATARAX        Dose:  50 mg   Take 1 tablet (50 mg) by mouth At Bedtime   Quantity:  30 tablet   Refills:  1         CONTINUE these medicines which may have CHANGED, or have new prescriptions. If we are uncertain of the size of tablets/capsules you have at home, strength may be listed as something that might have changed.        Dose / Directions    melatonin 3 MG tablet   This may have changed:    - how much to take  - when to take this  - reasons to take this        Dose:  6 mg   Take 2 tablets (6 mg) by mouth At Bedtime   Quantity:  60 tablet   Refills:  1       QUEtiapine 400 MG tablet   Commonly known as:  SEROquel   This may have changed:    - medication strength  - how much to take  - when to take this  - reasons to take this        Dose:  400 mg   Take 1 tablet (400 mg) by mouth At Bedtime   Quantity:  30 tablet   Refills:  1       traZODone 100 MG tablet   Commonly known as:  DESYREL   This may have changed:    - when to take this  - reasons to take this        Dose:  100 mg   Take 1 tablet (100 mg) by mouth At Bedtime   Quantity:  30 tablet   Refills:  1          CONTINUE these medicines which have NOT CHANGED        Dose / Directions    albuterol 108 (90 BASE) MCG/ACT Inhaler   Commonly known as:  PROAIR HFA/PROVENTIL HFA/VENTOLIN HFA   Indication:  Asthma        Dose:  2 puff   Inhale 2 puffs into the lungs 4 times daily as needed for shortness of breath / dyspnea or wheezing   Refills:  0         STOP taking     AMBIEN PO           sertraline 50 MG tablet   Commonly known as:  ZOLOFT                Where to get your medicines      These medications were sent to [facility] [place], [place] - [address]  [address] [facility] [address], [facility] [address]     Phone:  [phone]     FLUoxetine 40 MG capsule    hydrOXYzine 50 MG tablet    melatonin 3 MG tablet    QUEtiapine 400 MG tablet    traZODone 100 MG tablet                Protect others around you: Learn how to safely use, store and throw away your medicines at www.disposemymeds.org.             Medication List: This is a list of all your medications and when to take them. Check marks below indicate your daily home schedule. Keep this list as a reference.      Medications           Morning Afternoon Evening Bedtime As Needed    albuterol 108 (90 BASE) MCG/ACT Inhaler   Commonly known as:  PROAIR HFA/PROVENTIL HFA/VENTOLIN HFA   Inhale 2 puffs into the lungs 4 times daily as needed for shortness of breath / dyspnea or wheezing                                FLUoxetine 40 MG capsule   Commonly known as:  PROzac   Take 1 capsule (40 mg) by mouth daily   Last time this was given:  40 mg on 12/11/2017  8:57 AM                                hydrOXYzine 50 MG tablet   Commonly known as:  ATARAX   Take 1 tablet (50 mg) by mouth At Bedtime   Last time this was given:  50 mg on 12/10/2017  9:22 PM                                melatonin 3 MG tablet   Take 2 tablets (6 mg) by mouth At Bedtime   Last time this was given:  6 mg on 12/10/2017  9:23 PM                                QUEtiapine 400 MG tablet    Commonly known as:  SEROquel   Take 1 tablet (400 mg) by mouth At Bedtime   Last time this was given:  400 mg on 12/10/2017  9:22 PM                                traZODone 100 MG tablet   Commonly known as:  DESYREL   Take 1 tablet (100 mg) by mouth At Bedtime   Last time this was given:  100 mg on 12/10/2017  9:23 PM

## 2017-11-22 NOTE — IP AVS SNAPSHOT
[address] [address]    Phone:  [phone]                                       After Visit Summary   11/22/2017    Sidney Varghese    MRN: [id number]           After Visit Summary Signature Page     I have received my discharge instructions, and my questions have been answered. I have discussed any challenges I see with this plan with the nurse or doctor.    ..........................................................................................................................................  Patient/Patient Representative Signature      ..........................................................................................................................................  Patient Representative Print Name and Relationship to Patient    ..................................................               ................................................  Date                                            Time    ..........................................................................................................................................  Reviewed by Signature/Title    ...................................................              ..............................................  Date                                                            Time

## 2017-11-22 NOTE — TELEPHONE ENCOUNTER
S: Brenda ([facility] ) Phone# [phone] called in regards so pt's provisional discharge being revoked. Is on his way to our ED BIB ambulance.    B: Pt reportedly is suicidal with a plan to crash his snowmobile into trees    A: Court papers received via fax.     R: awaiting arrival to ED for placement.

## 2017-11-23 PROCEDURE — 25000132 ZZH RX MED GY IP 250 OP 250 PS 637: Performed by: PSYCHIATRY & NEUROLOGY

## 2017-11-23 PROCEDURE — 12400007 ZZH R&B MH INTERMEDIATE UMMC

## 2017-11-23 PROCEDURE — 99223 1ST HOSP IP/OBS HIGH 75: CPT | Mod: AI | Performed by: PSYCHIATRY & NEUROLOGY

## 2017-11-23 RX ADMIN — TRAZODONE HYDROCHLORIDE 100 MG: 100 TABLET ORAL at 22:26

## 2017-11-23 RX ADMIN — MELATONIN TAB 3 MG 6 MG: 3 TAB at 22:26

## 2017-11-23 ASSESSMENT — ACTIVITIES OF DAILY LIVING (ADL)
ORAL_HYGIENE: INDEPENDENT
DRESS: SCRUBS (BEHAVIORAL HEALTH);INDEPENDENT
ORAL_HYGIENE: INDEPENDENT
GROOMING: INDEPENDENT
LAUNDRY: WITH SUPERVISION
DRESS: SCRUBS (BEHAVIORAL HEALTH)
GROOMING: HANDWASHING;INDEPENDENT

## 2017-11-23 NOTE — PROGRESS NOTES
"Pt refused MSSA assessment and vital signs this morning, replied \"I don't need them.\" Appeared to sleep approximately 7 hours.  "

## 2017-11-23 NOTE — PROGRESS NOTES
11/22/17 2254   Patient Belongings   Did you bring any home meds/supplements to the hospital?  No   Patient Belongings money (see comment);shoes;cell phone/electronics;clothing   Disposition of Belongings Locker;Sent to security per site process   Belongings Search Yes   Clothing Search Yes   Second Staff Fox MENDIETA 30 Evenings   In locker 6:  Clothing, comb, cellphone, boots, hat  Security:  $122.00  A               Admission:  I am responsible for any personal items that are not sent to the safe or pharmacy.  [facility] is not responsible for loss, theft or damage of any property in my possession.    Signature:  _________________________________ Date: _______  Time: _____                                              Staff Signature:  ____________________________ Date: ________  Time: _____      2nd Staff person, if patient is unable/unwilling to sign:    Signature: ________________________________ Date: ________  Time: _____     Discharge:  [place] has returned all of my personal belongings:    Signature: _________________________________ Date: ________  Time: _____                                          Staff Signature:  ____________________________ Date: ________  Time: _____

## 2017-11-23 NOTE — PLAN OF CARE
Admission:     Admitted to [facility] after being picked up at home by police [facility]. Hx substance dependence. Hx liver damage and HTN. Recent dx lung cancer reported. (See telephone encounter in chart review and paperwork in chart for further details and hx). Was originally taken to [facility] and transferred to [facility]. Patient intoxicated on arrival to ER with a BAL of 0.162 @ 1550. Reported absence of SI and HI. Reported to have been making suicidal statements yesterday. Denies hx of seizures.     When I approach to complete admission, he is lying in bed. Refusing to answer questions and refusing all interventions at this time. Unable to complete MSSA scoring at this time. Unable to complete admission at this time due to pt refusal. See flowsheet for details. Encouraged to notify staff of needs, questions, and concerns. Pt does not answer.

## 2017-11-23 NOTE — H&P
"[facility], [place]   Psychiatric History & Physical  Admission date: 11/22/2017  Sidney Varghese  [id number]  11/23/17    Time: 72 minutes on encounter, >50% of which was spent in counseling and/or coordination of care consisting of: communication and education with the patient, communication with family and or friends, lab/image/study evaluation, support staff communication, and other sources pertinent to excellent patient care.          Chief Complaint:   \"I'm here because the police picked me up\"        HPI:   Sidney Varghese with a past medical history of alcohol use, scoliosis, depression, possible lung cancer, hypertension was admitted 11/22/2017 for a revoked provisional discharge.    Sidney according to reports was picked up by police after not following through with his substance treatment program and and received a revoke provisional discharge from his .    He describes a confusing situation where he had completed 2 treatment programs and was going to attend a day treatment program once per week and his  wanted him there 3 times per week but he could not go because of transportation issues. He was then potentially revoked and brought into the hospital. He does take medications including trazodone, melatonin, quetiapine and describes strange dreams. Apparently he probably has sleep apnea described as not breathing at times when he is sleeping and excessively loud snoring. He denies any thoughts of harming himself or others and has hopelessness and helplessness that comes and goes and continued sleep difficulties even with medicine. He denies any attention or concentration issues or anhedonia or any psychotic symptoms or paranoia. No eating disorder symptoms excessive compulsive disorder symptoms gambling addiction pornography addiction or sexual addiction. No manic episodes previously and panic episodes at times.    We discussed the likelihood of him " having obstructive sleep apnea and him needing sleep study evaluation and that no medication would potentially help him unless we figured out his sleep dysfunction.    Physically he has back pain and also recently broke his toes upon awakening and had his feet and a mousetrap his mother placed on the floor. He also has a head cold. He did not follow-up with his outpatient plan for the abnormal findings on his lungs.        Past Psychiatric History:     Believes his depression started at the age of 41 and has had 2 previous hospitalizations. His never had electroconvulsive therapy but possibly traumatic brain injuries and no seizures. He is never attempted suicide and does not currently have a psychiatrist or therapist. He has an [place] CD commitment from [facility] and has a  by the name of Brenda. No previous violent behavior or current charges beyond that.          Substance Use and History:     Substance use started at the age of 17 with alcohol last drink Wednesday, 2 previous DUIs, start smoking cigarettes at age 17 currently smoking half a pack per day previously finished a 5 star treatment has been to 5 treatment facilities in the past.          Past Medical History:   PAST MEDICAL HISTORY:   Past Medical History:   Diagnosis Date     Cancer (H)      Depressive disorder      Scoliosis      Substance abuse        PAST SURGICAL HISTORY:   Past Surgical History:   Procedure Laterality Date     C4-6 cervical fusion  3019-1449     ORTHOPEDIC SURGERY  2016    R ankle              Family History:   FAMILY HISTORY:   Family History   Problem Relation Age of Onset     DIABETES Mother      Colon Cancer Mother      DIABETES Father      CEREBROVASCULAR DISEASE Father      HEART DISEASE Father      Sleep Apnea Brother            Social History:   SOCIAL HISTORY:   Social History     Social History     Marital status: Single     Spouse name: N/A     Number of children: N/A     Years of education: N/A     Social  History Main Topics     Smoking status: Current Every Day Smoker     Packs/day: 0.50     Types: Cigarettes     Smokeless tobacco: Not on file     Alcohol use Yes     Drug use: Not on file     Sexual activity: Not on file     Other Topics Concern     Not on file     Social History Narrative    Born and raised in [place] by his parents and no abuse history. Completed school reported working right after that he has 1 child no previous  or marriages. He does not have contact with his child. He works primarily with his father is a . He lives with his family and enjoys spending time with them. He does not have any access to guns or weapons.            Physical ROS:   The patient endorsed the above issues. The remainder of 10-point review of systems was negative except as noted in HPI.         PTA Medications:     Prescriptions Prior to Admission   Medication Sig Dispense Refill Last Dose     Zolpidem Tartrate (AMBIEN PO) Take 5 mg by mouth nightly as needed for sleep        albuterol (PROAIR HFA/PROVENTIL HFA/VENTOLIN HFA) 108 (90 BASE) MCG/ACT Inhaler Inhale 2 puffs into the lungs 4 times daily as needed for shortness of breath / dyspnea or wheezing        melatonin 3 MG tablet Take 1-2 tablets (3-6 mg) by mouth nightly as needed for sleep 60 tablet 1      sertraline (ZOLOFT) 50 MG tablet Take 1 tablet (50 mg) by mouth daily 30 tablet 1      traZODone (DESYREL) 100 MG tablet Take 1 tablet (100 mg) by mouth nightly as needed for sleep 60 tablet 1      QUEtiapine (SEROQUEL) 50 MG tablet Take 1-2 tablets ( mg) by mouth nightly as needed (insomnia) (Patient taking differently: Take 200 mg by mouth nightly as needed (insomnia) ) 60 tablet 1           Allergies:   No Known Allergies       Labs:   No results found for this or any previous visit (from the past 48 hour(s)).       Physical and Psychiatric Examination:     BP (!) 147/94  Pulse 99  Temp 98.2  F (36.8  C) (Oral)  Resp 16  Ht 1.778 m (5'  "10\")  Wt 81.6 kg (180 lb)  BMI 25.83 kg/m2  Weight is 180 lbs 0 oz  Body mass index is 25.83 kg/(m^2).                Sitting Orthostatic BP: 165/100      Sitting Orthostatic Pulse: 98 bpm      Standing Orthostatic BP: 160/94      Standing Orthostatic Pulse: 99 bpm     Last 4 weights:  Wt Readings from Last 4 Encounters:   11/23/17 81.6 kg (180 lb)   08/24/17 79.4 kg (175 lb)   08/07/17 81 kg (178 lb 9.2 oz)   02/27/17 77.1 kg (170 lb)       Physical Exam:  None completed    Mental Status Exam:  Sidney is a 52-year-old male that appears older than his stated age with hospital scrubs. His speech is appropriate rate and tone and his behavior is appropriate and he does not have any abnormal movements. His affect is neutral and he smiles at times. His mood he describes as SH I T. His thought content consists of the above issues without thoughts of harming himself or others or any current delusions. His thought processes logical without looseness of association. He does not have any abnormal perceptions. He is alert of his current location and circumstances. His attention and concentration appears adequate. His cognition and fund of knowledge appears normal. His long-term/short-term/remote memory appears intact. His insight and judgment are both limited.         Admission Diagnoses:   Major depressive disorder recurrent currently in partial remission  Panic disorder  Alcohol use disorder severe  Tobacco use disorder  Possible history of traumatic brain injury         Assessment & Plan:     Assessment:  Sidney was picked up for a provisional discharge that was revoked by his  or possibly not following through with treatment recommendations as above. He does have a potential history of depression and also alcohol use but might have obstructive sleep apnea leading to sleep difficulties. We discussed that no medicine could assist us in that he needs to have his sleep evaluated in detail. He is accepting of " this information and we continue to monitor him and obtain further details from his .    Plan:  Continue current outpatient medications and currently having his provisional discharge revoked  Needs outpatient sleep study  Ordering CPAP while he is here             Will Parr  [facility] Psychiatry      The following document has been created with voice recognition software and may contain unintentional word substitutions.

## 2017-11-24 PROCEDURE — 12400007 ZZH R&B MH INTERMEDIATE UMMC

## 2017-11-24 PROCEDURE — 99232 SBSQ HOSP IP/OBS MODERATE 35: CPT | Performed by: NURSE PRACTITIONER

## 2017-11-24 PROCEDURE — 99207 ZZC CONSULT E&M CHANGED TO INITIAL LEVEL: CPT | Performed by: PHYSICIAN ASSISTANT

## 2017-11-24 PROCEDURE — 99221 1ST HOSP IP/OBS SF/LOW 40: CPT | Performed by: PHYSICIAN ASSISTANT

## 2017-11-24 PROCEDURE — 25000132 ZZH RX MED GY IP 250 OP 250 PS 637: Performed by: PSYCHIATRY & NEUROLOGY

## 2017-11-24 RX ORDER — CLONIDINE HYDROCHLORIDE 0.1 MG/1
0.1 TABLET ORAL 3 TIMES DAILY PRN
Status: DISCONTINUED | OUTPATIENT
Start: 2017-11-24 | End: 2017-12-11 | Stop reason: HOSPADM

## 2017-11-24 RX ADMIN — QUETIAPINE FUMARATE 200 MG: 200 TABLET, FILM COATED ORAL at 21:53

## 2017-11-24 RX ADMIN — SERTRALINE HYDROCHLORIDE 50 MG: 50 TABLET ORAL at 10:05

## 2017-11-24 RX ADMIN — MELATONIN TAB 3 MG 6 MG: 3 TAB at 21:53

## 2017-11-24 RX ADMIN — TRAZODONE HYDROCHLORIDE 100 MG: 100 TABLET ORAL at 21:53

## 2017-11-24 ASSESSMENT — ACTIVITIES OF DAILY LIVING (ADL)
DRESS: INDEPENDENT
DRESS: SCRUBS (BEHAVIORAL HEALTH)
LAUNDRY: WITH SUPERVISION
ORAL_HYGIENE: INDEPENDENT
GROOMING: INDEPENDENT
ORAL_HYGIENE: INDEPENDENT
GROOMING: INDEPENDENT

## 2017-11-24 NOTE — PROGRESS NOTES
NON ATTENDANCE:      11/24/17 1400   General Information   Has Not Attended OT as of: 11/24/17   Special Considerations [place] attendance and participation.   Pt. will be given a Self Assessment form.  O.T. Staff will explain the value of including them in their treatment plan and offer options to meet their needs and identify goals.

## 2017-11-24 NOTE — PROGRESS NOTES
11/24/17 1443   Behavioral Health   Hallucinations denies / not responding to hallucinations   Thinking intact   Orientation person: oriented;place: oriented;date: oriented   Memory baseline memory   Insight admits / accepts   Judgement impaired   Eye Contact at examiner   Affect blunted, flat   Mood anxious   Physical Appearance/Attire attire appropriate to age and situation   Hygiene well groomed   Suicidality (Denies)   1. Wish to be Dead No   2. Non-Specific Active Suicidal Thoughts  No   3. Active Sucidal Ideation with any Methods (Not Plan) Without Intent to Act  No   4. Active Suicidal Ideation with Some Intent to Act, Without Specific Plan  No   5. Active Suicidal Ideation with Specific Plan and Intent  No   Self Injury (denies)   Elopement (nothing to report)   Activity (In milieu and moderately social)   Speech clear;coherent   Medication Sensitivity no observed side effects   Psychomotor / Gait balanced;steady   Psycho Education   Type of Intervention 1:1 intervention   Response participates, initiates socially appropriate   Hours 0.5   Treatment Detail (Check In)   Activities of Daily Living   Hygiene/Grooming independent   Oral Hygiene independent   Dress independent   Room Organization independent   The patient was out of his room and in common areas during this shift.  The patient was not social with peers during the first part of the day, but as the day progressed he was more and more social with those around him.  The patient was not attending groups and had a flat affect.  The patient was not wanting to check in, but with prompting did briefly talk.  The patient stated that he knows the drill and without prompting stated that his anxiety was low, he was not feeling suicidal, did not have thoughts about SiB, and was doing 'good.'  The patient was more casually engaged through the shift in conversation to avoid the formal check in feeling, and was pleasent to speak too.  The patient is awaiting  placement and content with how things are progressing here at the hospital.

## 2017-11-24 NOTE — PROGRESS NOTES
"Pt visible in milieu all shift watching TV with peers, blunted flat affect. Pt irritable on approach, declined 1:1 check in, dismissive towards staff, \"it's not my first rodeo, I know how this works\" . CHELSI mental health symptoms and SI/SIB thoughts.      11/23/17 2100   Behavioral Health   Hallucinations denies / not responding to hallucinations   Thinking poor concentration   Orientation time: oriented;date: oriented;place: oriented;person: oriented   Memory baseline memory   Insight insight appropriate to situation   Judgement impaired   Eye Contact at examiner   Affect irritable;tense   Mood irritable   Physical Appearance/Attire untidy   Hygiene neglected grooming - unclean body, hair, teeth   Suicidality other (see comments)  (CHELSI declined check in.)   Self Injury other (see comment)  (no SIB thoughts stated or observed.)   Activity other (see comment)  (Pt visible watching TV.)   Speech clear   Medication Sensitivity no stated side effects;no observed side effects   Psychomotor / Gait steady;balanced   Psycho Education   Type of Intervention 1:1 intervention   Response participates, initiates socially appropriate   Hours 0.5   Treatment Detail (check in)   Activities of Daily Living   Hygiene/Grooming independent   Oral Hygiene independent   Dress scrubs (behavioral health)   Laundry with supervision   Room Organization independent   Activity   Activity Assistance Provided independent     "

## 2017-11-24 NOTE — PROGRESS NOTES
[facility], [place]   Psychiatric Progress Note      Impression:     Sidney Varghese is a 52-year-old male admitted to [facility] on 11/22/2017.  He was admitted from [place] ED under MICD commitment with his stay of commitment recently vacated.  He had been hospitalized 8/7/2017 through 8/25/2017.  After completing residential CD treatment, he had been inconsistently compliant with outpatient CD treatment, and when his  called him he reported suicidal ideation.  He says that he consumed alcohol on about 10 occasions since his discharge.  The MSSA was initiated but he did not required treatment with Valium.  Zoloft was discontinued and replaced with Prozac.  PRNs of Trazodone and Seroquel were continued.  PRNs of Vistaril and Zyprexa are available.  He has been irritable.  He reports depressed mood.  He denies suicidal ideation.           Diagnoses:     Major depressive disorder, moderate, recurrent  Panic disorder  Alcohol use disorder, severe  Nicotine use disorder  History of possible TBI  Asthma   Questionable diagnosis of lung cancer  Possible sleep apnea         Plan:     Medications:  Continue Zoloft.  Continue PRNs of Seroquel, Trazodone, Vistaril and Zyprexa.  Discontinue vitamins ordered as part of the MSSA and he refuses to take them.    He is under MICD commitment.  Coordinate disposition with outpatient .  He must follow up with his PCP regarding questionable diagnosis of lung cancer, and for a sleep study referral.      Attestation:  Patient has been seen and evaluated by me,  JAMIA Spears CNP  The patient was counseled on  nature of illness and treatment plan/options  Care was coordinated with  tx team          Interim History:     The patient's care was discussed with the treatment team and chart notes were reviewed.  Pt was documented as sleeping 7.5 hours during the overnight shift.  Staff describe his behaviors as  "dismissive and state he has been irritable.  During our conversation today pt had many questions and concerns.  Stated that he wanted a copy of his revocation paperwork.  This was provided.  Stated he wanted to be moved to a room farther away from the desk because of the noise.  He requested his CM's number. This was provided.  Pt asked about length of stay and disposition and was informed that the inpatient team will work with his outpatient CM to determine this.  Pt is upset because he works as a  and cannot go to outpatient CD treatment 3 times per week, which is what his  reportedly recommended.  His mood is depressed, anxious and irritable.  He denies SI.  He does not think that Zoloft has helped and would like to try a new medication.           Medications:     Current Facility-Administered Medications   Medication     albuterol (PROAIR HFA/PROVENTIL HFA/VENTOLIN HFA) Inhaler 2 puff     melatonin tablet 3-6 mg     QUEtiapine (SEROquel) tablet 200 mg     sertraline (ZOLOFT) tablet 50 mg     traZODone (DESYREL) tablet 100 mg     hydrOXYzine (ATARAX) tablet 25-50 mg     diazepam (VALIUM) tablet 5-20 mg     thiamine tablet 100 mg     folic acid (FOLVITE) tablet 1 mg     multivitamin, therapeutic with minerals (THERA-VIT-M) tablet 1 tablet     alum & mag hydroxide-simethicone (MYLANTA ES/MAALOX  ES) suspension 30 mL     magnesium hydroxide (MILK OF MAGNESIA) suspension 30 mL     bisacodyl (DULCOLAX) Suppository 10 mg     OLANZapine (zyPREXA) tablet 10 mg    Or     OLANZapine (zyPREXA) injection 10 mg     nicotine polacrilex (NICORETTE) gum 2-4 mg     ibuprofen (ADVIL/MOTRIN) tablet 400 mg             Allergies:   No Known Allergies         Psychiatric Examination:   BP (!) 157/107  Pulse 69  Temp 98.6  F (37  C) (Oral)  Resp 16  Ht 1.778 m (5' 10\")  Wt 81.6 kg (180 lb)  BMI 25.83 kg/m2  Weight is 180 lbs 0 oz  Body mass index is 25.83 kg/(m^2).    Appearance:  awake, alert, somewhat " disheveled  Attitude:  guarded  Eye Contact:  good  Mood:  anxious and depressed, irritable  Affect:  irritable  Speech:  clear, coherent  Psychomotor Behavior:  no evidence of tardive dyskinesia, dystonia, or tics  Thought Process:  linear and goal oriented  Associations:  no loose associations  Thought Content:  no evidence of suicidal ideation or homicidal ideation and no evidence of psychotic thought  Insight:  partial  Judgment:  fair  Oriented to: date, time, person, and place  Attention Span and Concentration:  intact  Recent and Remote Memory:  intact  Language: intact  Fund of Knowledge: appropriate  Muscle Strength and Tone: normal  Gait and Station: Normal         Labs:   No results found for this or any previous visit (from the past 24 hour(s)).

## 2017-11-24 NOTE — CONSULTS
"  Internal Medicine History and Physical    Patient Name: Sidney Varghese MRN# [id number]   Age: 52 year old YOB: 1965     Date of Admission: 11/22/2017      Primary care provider: [facility], [place]         Assessment and Plan:   Sidney Varghese is a 52 year old male with a hx of alcohol dependency and depression who was admitted to [facility] due to revoked provisional discharge.     # Depression; Alcohol abuse, acute withdrawal. Management per primary team, psychiatry. AST 60 ALT 39, mildly elevated in 2:1 ratio consistent with alcohol abuse. Tbili normal.   - Agree with MSSA using valium  - Agree with folic acid/thimaine replacement    Elevated BP. Intermittently elevated since admission. No PTA meds or official diagnosis of hypertension. Current elevation likely 2/2 acute withdrawal from alcohol.   - clonidine prn for SBP >170, DBP >100  - notify IM if SBP consistently >150 despite resolution of withdrawal    Chronic L flank pain. Ongoing since at least 05/2017. Evaluated in clinic for this issue. CT abd pelvis at this time without renal stone or hydronephrosis. Denies dysuria, frequency or urgency. No suprapubic pain. No fevers or chills. VSS.  - UA  - Ibuprofen prn  - Could consider renal US to evaluate for hydronephrosis if UA unremarkable and pain ongoing    Upper URI. 2 week hx of rhinorrhea, cough and nasal congestion. Afebrile. Lungs clear on exam. Likely viral URI.  - Encourage fluid intake  - Nasal saline spray for congestion  - Ibuprofen prn    Internal medicine will follow for results of UA.      Kiley Rodgers  Internal Medicine [facility] Hospitalist  [facility]  Pager: [phone]         Chief Complaint:   \"I have no idea where I am here\"         HPI:   Sidney Varghese is a 52 year old male with a hx of alcohol dependency and depression who was admitted to [facility] station 32 due to revoked provisional discharge.   Upon interview, patient states that \"I " "have no idea why I am here, you tell me!\". He complains of a two week history of nasal congestion, rhinorrhea and productive cough. He denies fevers or chills. He also complains of L flank pain that wrasp around to his abdomen. He states that this pain comes and goes. He cant remember when the pain started. It is not associated with constipation, nausea, vomiting, fevers or chill. He rates the pain as 8/10. He denies ever being evaluated for this problem before (incorrect per chart review).   He denies any hx of heart or lung disease, and currently denies chest pain, shortness of breath or difficulty breathing.          Review of Systems:   A 10 point ROS was performed and negative unless otherwise noted in HPI.          Past Medical History:   Reviewed and updated in Epic.  Past Medical History:   Diagnosis Date     Cancer (H)      Depressive disorder      Scoliosis      Substance abuse             Past Surgical History:   Reviewed and updated in Epic.  Past Surgical History:   Procedure Laterality Date     C4-6 cervical fusion  [id number]     ORTHOPEDIC SURGERY  2016    R ankle             Social History:   Reviewed and updated in [employer].  Social History     Social History     Marital status: Single     Spouse name: N/A     Number of children: N/A     Years of education: N/A     Occupational History     Not on file.     Social History Main Topics     Smoking status: Current Every Day Smoker     Packs/day: 0.50     Types: Cigarettes     Smokeless tobacco: Not on file     Alcohol use Yes     Drug use: Not on file     Sexual activity: Not on file     Other Topics Concern     Not on file     Social History Narrative    Born and raised in [place] by his parents and no abuse history. Completed school reported working right after that he has 1 child no previous  or marriages. He does not have contact with his child. He works primarily with his father is a . He lives with his family and enjoys spending " "time with them. He does not have any access to guns or weapons.            Family History:   Reviewed and updated in Epic.  Family History   Problem Relation Age of Onset     DIABETES Mother      Colon Cancer Mother      DIABETES Father      CEREBROVASCULAR DISEASE Father      HEART DISEASE Father      Sleep Apnea Brother             Allergies:    No Known Allergies         Medications:     Prescriptions Prior to Admission   Medication Sig Dispense Refill Last Dose     Zolpidem Tartrate (AMBIEN PO) Take 5 mg by mouth nightly as needed for sleep        albuterol (PROAIR HFA/PROVENTIL HFA/VENTOLIN HFA) 108 (90 BASE) MCG/ACT Inhaler Inhale 2 puffs into the lungs 4 times daily as needed for shortness of breath / dyspnea or wheezing        melatonin 3 MG tablet Take 1-2 tablets (3-6 mg) by mouth nightly as needed for sleep 60 tablet 1      sertraline (ZOLOFT) 50 MG tablet Take 1 tablet (50 mg) by mouth daily 30 tablet 1      traZODone (DESYREL) 100 MG tablet Take 1 tablet (100 mg) by mouth nightly as needed for sleep 60 tablet 1      QUEtiapine (SEROQUEL) 50 MG tablet Take 1-2 tablets ( mg) by mouth nightly as needed (insomnia) (Patient taking differently: Take 200 mg by mouth nightly as needed (insomnia) ) 60 tablet 1              Physical Exam:   Blood pressure (!) 157/107, pulse 69, temperature 98.6  F (37  C), temperature source Oral, resp. rate 16, height 1.778 m (5' 10\"), weight 81.6 kg (180 lb).  GENERAL: Alert and oriented x 3. NAD.   HEENT: Anicteric sclera. PERRL. Mucous membranes moist and without lesions.   CV: RRR. S1, S2. No murmurs appreciated.   RESPIRATORY: Effort normal on room air. Lungs CTAB with no wheezing, rales, rhonchi.   GI: Abdomen soft and non distended, bowel sounds present. CVA tenderness of L flank.   MUSCULOSKELETAL: No joint swelling or tenderness. Moves all extremities.   NEUROLOGICAL: No focal deficits. Strength 5/5 bilaterally in upper and lower extremities.   EXTREMITIES: No " peripheral edema. Intact bilateral pedal pulses.   SKIN: No jaundice. No rashes.

## 2017-11-25 PROCEDURE — 12400007 ZZH R&B MH INTERMEDIATE UMMC

## 2017-11-25 PROCEDURE — 25000132 ZZH RX MED GY IP 250 OP 250 PS 637: Performed by: PSYCHIATRY & NEUROLOGY

## 2017-11-25 PROCEDURE — 25000132 ZZH RX MED GY IP 250 OP 250 PS 637: Performed by: NURSE PRACTITIONER

## 2017-11-25 RX ADMIN — QUETIAPINE FUMARATE 200 MG: 200 TABLET, FILM COATED ORAL at 21:37

## 2017-11-25 RX ADMIN — TRAZODONE HYDROCHLORIDE 100 MG: 100 TABLET ORAL at 21:37

## 2017-11-25 RX ADMIN — HYDROXYZINE HYDROCHLORIDE 50 MG: 25 TABLET ORAL at 21:37

## 2017-11-25 RX ADMIN — FLUOXETINE 20 MG: 20 CAPSULE ORAL at 09:48

## 2017-11-25 RX ADMIN — MELATONIN TAB 3 MG 6 MG: 3 TAB at 21:37

## 2017-11-25 ASSESSMENT — ACTIVITIES OF DAILY LIVING (ADL)
LAUNDRY: WITH SUPERVISION
ORAL_HYGIENE: INDEPENDENT
ORAL_HYGIENE: INDEPENDENT
GROOMING: INDEPENDENT
GROOMING: INDEPENDENT
DRESS: SCRUBS (BEHAVIORAL HEALTH)

## 2017-11-25 NOTE — PLAN OF CARE
Problem: Behavioral Disturbance  Goal: Behavioral Disturbance  Signs and symptoms of listed problems will be absent or manageable by discharge or transition of care.    RN 48 hour assessment:  Patient was in and out of the milieu today. Patient is still irritable. He was upset that jeans were put into his locker because of him being on elopement precautions. Patient has competed substance withdrawal.

## 2017-11-26 PROCEDURE — 25000132 ZZH RX MED GY IP 250 OP 250 PS 637: Performed by: PSYCHIATRY & NEUROLOGY

## 2017-11-26 PROCEDURE — 25000132 ZZH RX MED GY IP 250 OP 250 PS 637: Performed by: NURSE PRACTITIONER

## 2017-11-26 PROCEDURE — 12400007 ZZH R&B MH INTERMEDIATE UMMC

## 2017-11-26 RX ADMIN — MELATONIN TAB 3 MG 6 MG: 3 TAB at 21:29

## 2017-11-26 RX ADMIN — QUETIAPINE FUMARATE 200 MG: 200 TABLET, FILM COATED ORAL at 21:29

## 2017-11-26 RX ADMIN — FLUOXETINE 20 MG: 20 CAPSULE ORAL at 09:07

## 2017-11-26 RX ADMIN — TRAZODONE HYDROCHLORIDE 100 MG: 100 TABLET ORAL at 21:29

## 2017-11-26 RX ADMIN — HYDROXYZINE HYDROCHLORIDE 50 MG: 25 TABLET ORAL at 21:29

## 2017-11-26 ASSESSMENT — ACTIVITIES OF DAILY LIVING (ADL)
ORAL_HYGIENE: INDEPENDENT
DRESS: SCRUBS (BEHAVIORAL HEALTH)
GROOMING: INDEPENDENT
GROOMING: INDEPENDENT
LAUNDRY: WITH SUPERVISION
ORAL_HYGIENE: INDEPENDENT

## 2017-11-26 NOTE — PROGRESS NOTES
"Pt visible in milieu, watching tv in lounge,socializing with peers. Pt is a bit irritable & not wanting to engage in a check in . He said \" I'm fine, not suicidal, feel like shit but otherwise no problem\"      11/26/17 1400   Behavioral Health   Hallucinations denies / not responding to hallucinations   Thinking poor concentration   Orientation person: oriented   Memory baseline memory   Insight poor;insight appropriate to situation   Judgement impaired   Eye Contact at examiner   Affect full range affect;irritable   Mood depressed   Physical Appearance/Attire untidy   Hygiene neglected grooming - unclean body, hair, teeth   Suicidality other (see comments)  (denies)   Speech clear;coherent   Psychomotor / Gait balanced;steady   Coping/Psychosocial   Verbalized Emotional State depression;frustration   Plan Of Care Reviewed With patient   Psycho Education   Type of Intervention 1:1 intervention   Response participates with encouragement   Hours 0.5   Treatment Detail check in   Activities of Daily Living   Hygiene/Grooming independent   Oral Hygiene independent   Dress scrubs (behavioral health)   Laundry with supervision   Room Organization prompts   Activity   Activity Assistance Provided independent   Behavioral Health Interventions   Behavioral Disturbance maintain safety precautions;maintain safe secure environment   Social and Therapeutic Interventions (Behavioral Disturbance) encourage effective boundaries with peers     "

## 2017-11-26 NOTE — PROGRESS NOTES
Brief Medicine Note    IM following for results of UA ordered 11/24. Per chart review UA has not been collected.    IM will sign off at this time. If UA collected and abnormal, notify IM.       Kiley Rodgers PA-C  Hospitalist Service  Pager [phone]

## 2017-11-27 LAB
ALBUMIN UR-MCNC: NEGATIVE MG/DL
AMPHETAMINES UR QL SCN: NEGATIVE
APPEARANCE UR: CLEAR
BARBITURATES UR QL: NEGATIVE
BENZODIAZ UR QL: NEGATIVE
BILIRUB UR QL STRIP: NEGATIVE
CANNABINOIDS UR QL SCN: NEGATIVE
COCAINE UR QL: NEGATIVE
COLOR UR AUTO: YELLOW
ETHANOL UR QL SCN: NEGATIVE
GLUCOSE UR STRIP-MCNC: NEGATIVE MG/DL
HGB UR QL STRIP: NEGATIVE
KETONES UR STRIP-MCNC: NEGATIVE MG/DL
LEUKOCYTE ESTERASE UR QL STRIP: NEGATIVE
NITRATE UR QL: NEGATIVE
OPIATES UR QL SCN: NEGATIVE
PCP UR QL SCN: NEGATIVE
PH UR STRIP: 7 PH (ref 5–7)
RBC #/AREA URNS AUTO: <1 /HPF (ref 0–2)
SOURCE: NORMAL
SP GR UR STRIP: 1.01 (ref 1–1.03)
UROBILINOGEN UR STRIP-MCNC: NORMAL MG/DL (ref 0–2)
WBC #/AREA URNS AUTO: 0 /HPF (ref 0–2)

## 2017-11-27 PROCEDURE — 12400007 ZZH R&B MH INTERMEDIATE UMMC

## 2017-11-27 PROCEDURE — 25000132 ZZH RX MED GY IP 250 OP 250 PS 637: Performed by: PSYCHIATRY & NEUROLOGY

## 2017-11-27 PROCEDURE — 80320 DRUG SCREEN QUANTALCOHOLS: CPT | Performed by: NURSE PRACTITIONER

## 2017-11-27 PROCEDURE — 81001 URINALYSIS AUTO W/SCOPE: CPT | Performed by: NURSE PRACTITIONER

## 2017-11-27 PROCEDURE — 25000132 ZZH RX MED GY IP 250 OP 250 PS 637: Performed by: NURSE PRACTITIONER

## 2017-11-27 PROCEDURE — 99232 SBSQ HOSP IP/OBS MODERATE 35: CPT | Performed by: NURSE PRACTITIONER

## 2017-11-27 PROCEDURE — 80307 DRUG TEST PRSMV CHEM ANLYZR: CPT | Performed by: NURSE PRACTITIONER

## 2017-11-27 RX ADMIN — QUETIAPINE FUMARATE 200 MG: 200 TABLET, FILM COATED ORAL at 21:15

## 2017-11-27 RX ADMIN — TRAZODONE HYDROCHLORIDE 100 MG: 100 TABLET ORAL at 21:15

## 2017-11-27 RX ADMIN — MELATONIN TAB 3 MG 6 MG: 3 TAB at 21:15

## 2017-11-27 RX ADMIN — FLUOXETINE 20 MG: 20 CAPSULE ORAL at 09:03

## 2017-11-27 RX ADMIN — HYDROXYZINE HYDROCHLORIDE 50 MG: 25 TABLET ORAL at 21:15

## 2017-11-27 ASSESSMENT — ACTIVITIES OF DAILY LIVING (ADL)
DRESS: INDEPENDENT
GROOMING: INDEPENDENT
GROOMING: INDEPENDENT
LAUNDRY: WITH SUPERVISION
LAUNDRY: WITH SUPERVISION
ORAL_HYGIENE: INDEPENDENT
DRESS: SCRUBS (BEHAVIORAL HEALTH)
ORAL_HYGIENE: INDEPENDENT

## 2017-11-27 NOTE — PROGRESS NOTES
[facility], [place]   Psychiatric Progress Note      Impression:     Sidney Varghese is a 52-year-old male admitted to [facility] on 11/22/2017.  He was admitted from [place] ED under MICD commitment with his stay of commitment recently vacated.  He had been hospitalized 8/7/2017 through 8/25/2017.  After completing residential CD treatment, he had been inconsistently compliant with outpatient CD treatment, and when his  called him he reported suicidal ideation.  He says that he consumed alcohol on about 10 occasions since his discharge.  The MSSA was initiated but he did not required treatment with Valium.  Zoloft was discontinued and replaced with Prozac.  PRNs of Trazodone, Melatonin and Seroquel were continued.  PRNs of Vistaril and Zyprexa are available.  He has been irritable.  He reports depressed mood.  He denies suicidal ideation.           Diagnoses:     Major depressive disorder, moderate, recurrent  Panic disorder  Alcohol use disorder, severe  Nicotine use disorder  History of possible TBI  Asthma   Questionable diagnosis of lung cancer  Possible sleep apnea         Plan:     Medications:  Increase Prozac to 40mg.  Continue PRNs of Seroquel, Melatonin, Trazodone, Vistaril and Zyprexa.      Collect specimen for UA and UTOX.    He is under MICD commitment.  Coordinate disposition with outpatient .  He must follow up with his PCP regarding questionable diagnosis of lung cancer, and for a sleep study referral.      Attestation:  Patient has been seen and evaluated by me,  JAMIA Spears CNP  The patient was counseled on  nature of illness and treatment plan/options  Care was coordinated with  tx team          Interim History:     The patient's care was discussed with the treatment team and chart notes were reviewed.  Pt was documented as sleeping 7, 7 and 7.5 hours during the weekend overnight shifts.  He has regularly been  "using PRNs of Seroquel, Melatonin and Trazodone.  His weekend was \"alright.\"  He denies suicidal ideation.  He denies feeling anxious or irritable however appeared irritable during the conversation today.  States he left a message for his outpatient CM to discuss his plan of care.           Medications:     Current Facility-Administered Medications   Medication     [START ON 11/28/2017] FLUoxetine (PROzac) capsule 40 mg     cloNIDine (CATAPRES) tablet 0.1 mg     sodium chloride (OCEAN) 0.65 % nasal spray 1 spray     albuterol (PROAIR HFA/PROVENTIL HFA/VENTOLIN HFA) Inhaler 2 puff     melatonin tablet 3-6 mg     QUEtiapine (SEROquel) tablet 200 mg     traZODone (DESYREL) tablet 100 mg     hydrOXYzine (ATARAX) tablet 25-50 mg     alum & mag hydroxide-simethicone (MYLANTA ES/MAALOX  ES) suspension 30 mL     magnesium hydroxide (MILK OF MAGNESIA) suspension 30 mL     bisacodyl (DULCOLAX) Suppository 10 mg     OLANZapine (zyPREXA) tablet 10 mg    Or     OLANZapine (zyPREXA) injection 10 mg     nicotine polacrilex (NICORETTE) gum 2-4 mg     ibuprofen (ADVIL/MOTRIN) tablet 400 mg             Allergies:   No Known Allergies         Psychiatric Examination:   BP (!) 172/113  Pulse 88  Temp 97.6  F (36.4  C) (Oral)  Resp 16  Ht 1.778 m (5' 10\")  Wt 82.8 kg (182 lb 8 oz)  BMI 26.19 kg/m2  Weight is 182 lbs 8 oz  Body mass index is 26.19 kg/(m^2).    Appearance:  awake, alert, somewhat disheveled  Attitude:  guarded  Eye Contact:  good  Mood:  depressed  Affect:  irritable  Speech:  clear, coherent  Psychomotor Behavior:  no evidence of tardive dyskinesia, dystonia, or tics  Thought Process:  linear and goal oriented  Associations:  no loose associations  Thought Content:  no evidence of suicidal ideation or homicidal ideation and no evidence of psychotic thought  Insight:  partial  Judgment:  fair  Oriented to: date, time, person, and place  Attention Span and Concentration:  intact  Recent and Remote Memory:  " intact  Language: intact  Fund of Knowledge: appropriate  Muscle Strength and Tone: normal  Gait and Station: Normal         Labs:   No results found for this or any previous visit (from the past 24 hour(s)).

## 2017-11-27 NOTE — PLAN OF CARE
Problem: Patient Care Overview  Goal: Team Discussion  Team Plan:   BEHAVIORAL TEAM DISCUSSION    Participants: Beth Spencer, BRISEYDA Lamb, MS,DEEP Barlow RN      Progress: Pt was admitted secondary to vacating of his stay of commitment. Pt was not following the conditions of his commitment.   Continued Stay Criteria/Rationale: Pt is vacated and currently under a full commitment.  Medical/Physical: see chart  Precautions:   Behavioral Orders   Procedures     Assault precautions     Code 1 - Restrict to Unit     Elopement precautions     Routine Programming     As clinically indicated     Status 15     Every 15 minutes.     Suicide precautions     Plan: The patient will continue to meet w/ the treatment team for assessment and treatment planning, CTC will continue to work w/ for discharge planning.    Rationale for change in precautions or plan: Pt's stay of commitment has been vacated. Plan is to [facility] to [facility] and have a remote PD.

## 2017-11-27 NOTE — PROGRESS NOTES
"  INITIAL PSYCHOSOCIAL ASSESSMENT AND NOTE  I have reviewed the chart met with the patient, and developed Care Plan.  Information for assessment was obtained from:  and chart  PRESENTING PROBLEM: Pt was admitted to [facility] following revocation of his stay of commitment. Pt's stay was revoked 11/22/2017. Pt was committed on a MI/CD basis on 8/23/2017. Pt  filed for a vacate of the stay of commitment because he did violate the conditions of the commitment. Pt did not attend aftercare through [facility] for the designated 3x/week; Per court document pt attended \"at most once a week and staying for only half of the group.\" Pt was counseled about this by the treatment program and  but continued to not engage.  suspected pt had relapsed and he had told her that he stopped taking medication.   Last Tuesday, pt's CM spoke with him and he threatened suicide so she sent the police for welfare check; however, they did not interceded when pt didn't answer the phone. She then vacated his stay, tells CTC that the police department reported pt smelling of alcohol. Pt refused a drug screen or breathalyzer in the ED. Was verbally aggressive, intoxicated and slurred his speech.    wants to do a remote PD, [facility] and then go to sober housing. Will contact state operated services to get this arranged.   The following areas have been assessed:  History of Mental Health and Chemical Dependency: This is pt's 3rd inpatient  admit. Reports hx of depression starting at age 41. No hx of suicide attempts.   Substance used started at age 17; pt currently denies use but ED records indicates that he was intoxicated in the ED. Pt refused a drug screen. Smokes 1/2 ppd.  Hx of 5 past CD treatments, 4 times at [facility] in [place] and once at [facility]. Was in aftercare at [facility] but was not attending.   Living Situation: lives with his parents  Significant " Life Events (Illness, Abuse, Trauma, Death): none noted  Family Description (Constellation, Family Psychiatric History): Never , one 20 year old daughter with whom he has no contact. Is in an 18 year relationship with a woman.   Both parents have hx of alcohol abuse. Great grandmother and an uncle committed suicide.   Financial Status: struggles financially, lives with parents. Has [employer] insurance  Occupational History: works as a  with Dad. Work hours vary.   Educational Background: graduated high school     Service History: none  Legal Issues: Probation office is [place], [phone] (CHARLENE on file). Pt on probation for aggravated harassment charges. Has had correction time. History of felony convictions, assault, terroristic threats, weapons possession  Ethnic/Cultural Issues: none  Spiritual Orientation: Church  Social Functioning (organizations, interests): Pt does continue to give conflicting information to various staff. Enjoys snowmobiling, dirt bike riding, race car driving    Current Treatment providers:   PCP: [facility]: [phone]  :  Brenda Blackburn from [facility], [phone] Cell: [phone] Fax: [phone]   Social Service Assessment/Plan:   Pt's stay of commitment has been vacated. Pt will be seen and assessed by provider and staff. Groups will be offered to assist pt with increasing knowledge, strategies and coping techniques to help manage mental health. CTC will meet with pt to provide individualized mental health resources and support. CTC will assist with developing a care plan and after hospital appointments. Pt's [facility]  plans to have pt return to [facility] and then sober housing.   Discharge Plan:   Summary:  You were admitted on 11/22/2017  due to violation of your commitment and substance Use Disorder.  You were treated by Beth Spencer NP and discharged on 12/11/2017 from [facility] to  chemical dependency treatment at [facility].    You were committed to the [employer] on 8/23/2017 and you were discharged on a stay of commitment which was revoked 11/22/2017. You are now  being discharged on a Provisional Discharge Agreement which shall remain in effect for the duration of the Commitment which expires on 2/23/2018.         Principal Diagnosis:   Major depressive disorder, moderate, recurrent  Panic disorder  Alcohol use disorder, severe  Nicotine use disorder    Health Care Follow-up Appointments:   You are being discharged to [facility]  [address] [place] [address]    :  Brenda Blackburn from [facility] Phone: [phone]  Fax: [phone]     You are discharging with a 30 day supply of your medications, after you have settled at the [facility] center, please make an appointment with your PCP or psychiatrist.   Primary Care Provider is:   [facility]  [address], [place] [address]  Phone: [phone]  Fax: [phone]

## 2017-11-27 NOTE — PROGRESS NOTES
"Pt still complaining of left flank pain. UA with culture negative. Pt has history of kidney stones, \"but it doesn't feel like that. Maybe it's my muscles, I'm not sure, but I've been getting it on and off for about 3 months.\" Denies need for prn medication at this time. Will continue to monitor.  "

## 2017-11-27 NOTE — PROGRESS NOTES
Pt sarcastic & cutting to staff at times. Irritated when asked if he wanted to go to group. Shows little motivation, visible in milieu watching movies & socializing w/ peers.     11/27/17 1100   Behavioral Health   Hallucinations denies / not responding to hallucinations   Thinking poor concentration   Orientation person: oriented   Memory baseline memory   Insight poor   Judgement impaired   Eye Contact at examiner   Affect full range affect   Mood mood is calm;irritable   Physical Appearance/Attire untidy   Hygiene neglected grooming - unclean body, hair, teeth   Suicidality other (see comments)  (none stated)   Self Injury other (see comment)  (none)   Activity other (see comment)  (visible in milieu, no group attendence)   Speech clear;coherent   Coping/Psychosocial   Verbalized Emotional State frustration;other (see comments)  (irritable)   Plan Of Care Reviewed With patient   Psycho Education   Type of Intervention 1:1 intervention   Response participates with encouragement   Hours (.03)   Treatment Detail brief check in   Activities of Daily Living   Hygiene/Grooming independent   Oral Hygiene independent   Dress scrubs (behavioral health)   Laundry with supervision   Room Organization independent   Activity   Activity Assistance Provided independent   Behavioral Health Interventions   Behavioral Disturbance maintain safety precautions   Social and Therapeutic Interventions (Behavioral Disturbance) encourage participation in therapeutic groups and milieu activities

## 2017-11-28 PROCEDURE — 99232 SBSQ HOSP IP/OBS MODERATE 35: CPT | Performed by: NURSE PRACTITIONER

## 2017-11-28 PROCEDURE — 12400007 ZZH R&B MH INTERMEDIATE UMMC

## 2017-11-28 PROCEDURE — 25000132 ZZH RX MED GY IP 250 OP 250 PS 637: Performed by: NURSE PRACTITIONER

## 2017-11-28 PROCEDURE — 25000132 ZZH RX MED GY IP 250 OP 250 PS 637: Performed by: PSYCHIATRY & NEUROLOGY

## 2017-11-28 RX ORDER — QUETIAPINE FUMARATE 400 MG/1
400 TABLET, FILM COATED ORAL AT BEDTIME
Status: DISCONTINUED | OUTPATIENT
Start: 2017-11-28 | End: 2017-12-11 | Stop reason: HOSPADM

## 2017-11-28 RX ORDER — TRAZODONE HYDROCHLORIDE 100 MG/1
100 TABLET ORAL AT BEDTIME
Status: DISCONTINUED | OUTPATIENT
Start: 2017-11-28 | End: 2017-12-11 | Stop reason: HOSPADM

## 2017-11-28 RX ORDER — HYDROXYZINE HYDROCHLORIDE 50 MG/1
50 TABLET, FILM COATED ORAL AT BEDTIME
Status: DISCONTINUED | OUTPATIENT
Start: 2017-11-28 | End: 2017-12-11 | Stop reason: HOSPADM

## 2017-11-28 RX ORDER — LANOLIN ALCOHOL/MO/W.PET/CERES
6 CREAM (GRAM) TOPICAL AT BEDTIME
Status: DISCONTINUED | OUTPATIENT
Start: 2017-11-28 | End: 2017-12-11 | Stop reason: HOSPADM

## 2017-11-28 RX ADMIN — TRAZODONE HYDROCHLORIDE 100 MG: 100 TABLET ORAL at 20:57

## 2017-11-28 RX ADMIN — HYDROXYZINE HYDROCHLORIDE 50 MG: 25 TABLET ORAL at 16:46

## 2017-11-28 RX ADMIN — QUETIAPINE FUMARATE 400 MG: 400 TABLET ORAL at 20:57

## 2017-11-28 RX ADMIN — HYDROXYZINE HYDROCHLORIDE 50 MG: 50 TABLET, FILM COATED ORAL at 20:56

## 2017-11-28 RX ADMIN — FLUOXETINE 40 MG: 20 CAPSULE ORAL at 09:09

## 2017-11-28 RX ADMIN — MELATONIN TAB 3 MG 6 MG: 3 TAB at 20:56

## 2017-11-28 ASSESSMENT — ACTIVITIES OF DAILY LIVING (ADL)
GROOMING: INDEPENDENT
GROOMING: INDEPENDENT
DRESS: STREET CLOTHES
LAUNDRY: UNABLE TO COMPLETE
ORAL_HYGIENE: INDEPENDENT
ORAL_HYGIENE: INDEPENDENT

## 2017-11-28 NOTE — PROGRESS NOTES
[facility], [place]   Psychiatric Progress Note      Impression:     Sidney Varghese is a 52-year-old male admitted to [facility] on 11/22/2017.  He was admitted from [place] ED under MICD commitment with his stay of commitment recently vacated.  He had been hospitalized 8/7/2017 through 8/25/2017.  After completing residential CD treatment, he had been inconsistently compliant with outpatient CD treatment, and when his  called him he reported suicidal ideation.  He says that he consumed alcohol on about 10 occasions since his discharge.  The MSSA was initiated but he did not required treatment with Valium.  Zoloft was discontinued and replaced with Prozac.  Trazodone, Melatonin and Seroquel were changed from PRN to scheduled at bedtime.  Vistaril was scheduled at bedtime.  PRNs of Vistaril and Zyprexa are available.  He has been irritable.  He reports depressed mood.  He denies suicidal ideation.           Diagnoses:     Major depressive disorder, moderate, recurrent  Panic disorder  Alcohol use disorder, severe  Nicotine use disorder  History of possible TBI  Asthma   Questionable diagnosis of lung cancer  Possible sleep apnea         Plan:     Medications:  Continue Prozac to 40mg.  Schedule Seroquel, Melatonin, Trazodone, and Vistaril at bedtime.  Continue PRNs of Vistaril and Zyprexa.      He is under MICD commitment.  Discharge to [facility] when there is a bed available.  He must follow up with his PCP regarding questionable diagnosis of lung cancer, and for a sleep study referral.      Attestation:  Patient has been seen and evaluated by me,  Sofya Spencer, JAMIA CNP  The patient was counseled on  nature of illness and treatment plan/options  Care was coordinated with  tx team          Interim History:     The patient's care was discussed with the treatment team and chart notes were reviewed.  Pt was documented as sleeping 7 hours  "during the overnight shift.  However he says he slept poorly, 4-5 hours.  States that his \"mind won't shut off\" at night.  Pt has been taking the same PRNs every night and agreed to have them changed to scheduled.  He has been spending time in the milieu but not attending groups.  Pt employed liberal use of sarcasm throughout our conversation.  He was irritable.  Pt is very upset about the plan to return to [facility] and states the only acceptable plan would be to reside with his parents.           Medications:     Current Facility-Administered Medications   Medication     QUEtiapine (SEROquel) tablet 400 mg     traZODone (DESYREL) tablet 100 mg     melatonin tablet 6 mg     hydrOXYzine (ATARAX) tablet 50 mg     FLUoxetine (PROzac) capsule 40 mg     cloNIDine (CATAPRES) tablet 0.1 mg     sodium chloride (OCEAN) 0.65 % nasal spray 1 spray     albuterol (PROAIR HFA/PROVENTIL HFA/VENTOLIN HFA) Inhaler 2 puff     hydrOXYzine (ATARAX) tablet 25-50 mg     alum & mag hydroxide-simethicone (MYLANTA ES/MAALOX  ES) suspension 30 mL     magnesium hydroxide (MILK OF MAGNESIA) suspension 30 mL     bisacodyl (DULCOLAX) Suppository 10 mg     OLANZapine (zyPREXA) tablet 10 mg    Or     OLANZapine (zyPREXA) injection 10 mg     nicotine polacrilex (NICORETTE) gum 2-4 mg     ibuprofen (ADVIL/MOTRIN) tablet 400 mg             Allergies:   No Known Allergies         Psychiatric Examination:   BP (!) 172/113  Pulse 88  Temp 97.6  F (36.4  C)  Resp 16  Ht 1.778 m (5' 10\")  Wt 83.4 kg (183 lb 14.4 oz)  BMI 26.39 kg/m2  Weight is 183 lbs 14.4 oz  Body mass index is 26.39 kg/(m^2).    Appearance:  awake, alert, somewhat disheveled  Attitude:  guarded, sarcastic  Eye Contact:  good  Mood:  Depressed, irritable  Affect:  irritable  Speech:  clear, coherent  Psychomotor Behavior:  no evidence of tardive dyskinesia, dystonia, or tics  Thought Process:  linear and goal oriented  Associations:  no loose associations  Thought " Content:  no evidence of suicidal ideation or homicidal ideation and no evidence of psychotic thought  Insight:  partial  Judgment:  fair  Oriented to: date, time, person, and place  Attention Span and Concentration:  intact  Recent and Remote Memory:  intact  Language: intact  Fund of Knowledge: appropriate  Muscle Strength and Tone: normal  Gait and Station: Normal         Labs:   No results found for this or any previous visit (from the past 24 hour(s)).

## 2017-11-28 NOTE — PLAN OF CARE
Problem: Behavioral Disturbance  Goal: Behavioral Disturbance  Signs and symptoms of listed problems will be absent or manageable by discharge or transition of care.   Outcome: Improving  Pt was visible in milieu, social with peers and staff. Affect was full range, tense. Mood was calm, irritable at times. No SI/SIB or medication side effects noted. No safety concerns this shift. Will continue to monitor.

## 2017-11-28 NOTE — PROGRESS NOTES
Pt. Was visible in the milieu, social and engaged with others. He stated the plan is for him to go to chemical dependency treatment after discharge, but that he does not feel it will be effective because he doesn't want to stop using. Pt. Remarked the only reason he is going is because he is committed and obligated. No other significant events to report.       11/28/17 1340   Behavioral Health   Hallucinations denies / not responding to hallucinations   Thinking intact   Orientation person: oriented;place: oriented;date: oriented   Memory baseline memory   Insight poor   Judgement impaired   Eye Contact at examiner   Affect full range affect   Mood mood is calm   Physical Appearance/Attire disheveled   Hygiene neglected grooming - unclean body, hair, teeth   Suicidality other (see comments)  (denies)   Wish to be Dead Description None stated.    Non-Specific Active Suicidal Thought Description None stated.    Change in Protective Factors? No   Enviromental Risk Factors None   Self Injury other (see comment)  (denies)   Elopement Distress about legal situation (holds for mental health or criminal);Statements about wanting to leave   Activity other (see comment)  (Present in the milieu, social and engaged with others.)   Speech clear;coherent   Medication Sensitivity no stated side effects;no observed side effects   Psychomotor / Gait balanced;steady   Coping/Psychosocial   Verbalized Emotional State frustration;powerlessness   Psycho Education   Type of Intervention 1:1 intervention   Response participates, initiates socially appropriate   Hours 0.5   Treatment Detail 1:1 check-in   Activities of Daily Living   Hygiene/Grooming independent   Oral Hygiene independent   Dress street clothes   Laundry unable to complete   Room Organization independent   Behavioral Health Interventions   Behavioral Disturbance maintain safety precautions;maintain safe secure environment;assist in development of alternative methods of  expressive communication;encourage clear communication of needs;assist patient in developing safety plan;encourage participation / independence with adls;provide emotional support;establish therapeutic relationship;assist with developing & utilizing healthy coping strategies;provide positive feedback for use of effective coping skills;build upon strengths;assess patient response to medication;assess medication adherance;monitor need for prn medication;monitor confusion, memory loss, decision making ability and reorient / intervent as needed   Social and Therapeutic Interventions (Behavioral Disturbance) encourage socialization with peers;encourage effective boundaries with peers;encourage participation in therapeutic groups and milieu activities

## 2017-11-29 PROCEDURE — 12400007 ZZH R&B MH INTERMEDIATE UMMC

## 2017-11-29 PROCEDURE — 99232 SBSQ HOSP IP/OBS MODERATE 35: CPT | Performed by: NURSE PRACTITIONER

## 2017-11-29 PROCEDURE — 25000132 ZZH RX MED GY IP 250 OP 250 PS 637: Performed by: NURSE PRACTITIONER

## 2017-11-29 PROCEDURE — H0001 ALCOHOL AND/OR DRUG ASSESS: HCPCS

## 2017-11-29 RX ORDER — LANOLIN ALCOHOL/MO/W.PET/CERES
6 CREAM (GRAM) TOPICAL AT BEDTIME
Qty: 60 TABLET | Refills: 1 | Status: SHIPPED | OUTPATIENT
Start: 2017-11-29 | End: 2018-11-01

## 2017-11-29 RX ORDER — TRAZODONE HYDROCHLORIDE 100 MG/1
100 TABLET ORAL AT BEDTIME
Qty: 30 TABLET | Refills: 1 | Status: SHIPPED | OUTPATIENT
Start: 2017-11-29 | End: 2018-11-01

## 2017-11-29 RX ORDER — QUETIAPINE FUMARATE 400 MG/1
400 TABLET, FILM COATED ORAL AT BEDTIME
Qty: 30 TABLET | Refills: 1 | Status: SHIPPED | OUTPATIENT
Start: 2017-11-29 | End: 2018-11-01

## 2017-11-29 RX ORDER — FLUOXETINE 40 MG/1
40 CAPSULE ORAL DAILY
Qty: 30 CAPSULE | Refills: 1 | Status: SHIPPED | OUTPATIENT
Start: 2017-11-30 | End: 2018-11-01

## 2017-11-29 RX ORDER — HYDROXYZINE HYDROCHLORIDE 50 MG/1
50 TABLET, FILM COATED ORAL AT BEDTIME
Qty: 30 TABLET | Refills: 1 | Status: SHIPPED | OUTPATIENT
Start: 2017-11-29 | End: 2018-11-01

## 2017-11-29 RX ADMIN — TRAZODONE HYDROCHLORIDE 100 MG: 100 TABLET ORAL at 21:14

## 2017-11-29 RX ADMIN — FLUOXETINE 40 MG: 20 CAPSULE ORAL at 09:14

## 2017-11-29 RX ADMIN — MELATONIN TAB 3 MG 6 MG: 3 TAB at 21:15

## 2017-11-29 RX ADMIN — QUETIAPINE FUMARATE 400 MG: 400 TABLET ORAL at 21:15

## 2017-11-29 RX ADMIN — HYDROXYZINE HYDROCHLORIDE 50 MG: 50 TABLET, FILM COATED ORAL at 21:14

## 2017-11-29 ASSESSMENT — ACTIVITIES OF DAILY LIVING (ADL)
LAUNDRY: WITH SUPERVISION
DRESS: STREET CLOTHES
GROOMING: INDEPENDENT
DRESS: INDEPENDENT
LAUNDRY: WITH SUPERVISION
GROOMING: INDEPENDENT
ORAL_HYGIENE: INDEPENDENT
ORAL_HYGIENE: INDEPENDENT

## 2017-11-29 NOTE — PROGRESS NOTES
[facility], [place]   Psychiatric Progress Note      Impression:     Sidney Varghese is a 52-year-old male admitted to [facility] on 11/22/2017.  He was admitted from [facility] under MICD commitment with his stay of commitment recently vacated.  He had been hospitalized 8/7/2017 through 8/25/2017.  After completing residential CD treatment, he had been inconsistently compliant with outpatient CD treatment, and when his  called him he reported suicidal ideation.  He says that he consumed alcohol on about 10 occasions since his discharge.  The MSSA was initiated but he did not require treatment with Valium.  Zoloft was discontinued and replaced with Prozac.  Trazodone, Melatonin and Seroquel were changed from PRN to scheduled at bedtime.  Vistaril was scheduled at bedtime.  PRNs of Vistaril and Zyprexa are available.  He has been irritable.  Mood is slightly improved.  He denies suicidal ideation.           Diagnoses:     Major depressive disorder, moderate, recurrent  Panic disorder  Alcohol use disorder, severe  Nicotine use disorder  History of possible TBI  Asthma   Questionable diagnosis of lung cancer  Possible sleep apnea         Plan:     Medications:  Continue Prozac to 40mg.  Continue Seroquel, Melatonin, Trazodone, and Vistaril at bedtime.  Continue PRNs of Vistaril and Zyprexa.  Discharge meds ordered.  His girlfriend will also retrieve medications from his parents' home.    He is under MICD commitment.  Discharge to [facility] when funding is approved and there is bed availability.  He will need a remote PD.  He must follow up with his PCP regarding questionable diagnosis of lung cancer, and for a sleep study referral.      Attestation:  Patient has been seen and evaluated by me,  Sofya Spencer, APRN CNP  The patient was counseled on  nature of illness and treatment plan/options  Care was coordinated with  tx team          Interim  "History:     The patient's care was discussed with the treatment team and chart notes were reviewed. Pt was documented as sleeping 7 hours during the overnight shift, but reports he slept poorly.  Mood is \"good\" and improved today, which he attributes to learning that he may be going to [facility] soon.  He denies SI.  He has not been attending groups and states he is not interested in doing so.           Medications:     Current Facility-Administered Medications   Medication     QUEtiapine (SEROquel) tablet 400 mg     traZODone (DESYREL) tablet 100 mg     melatonin tablet 6 mg     hydrOXYzine (ATARAX) tablet 50 mg     FLUoxetine (PROzac) capsule 40 mg     cloNIDine (CATAPRES) tablet 0.1 mg     sodium chloride (OCEAN) 0.65 % nasal spray 1 spray     albuterol (PROAIR HFA/PROVENTIL HFA/VENTOLIN HFA) Inhaler 2 puff     hydrOXYzine (ATARAX) tablet 25-50 mg     alum & mag hydroxide-simethicone (MYLANTA ES/MAALOX  ES) suspension 30 mL     magnesium hydroxide (MILK OF MAGNESIA) suspension 30 mL     bisacodyl (DULCOLAX) Suppository 10 mg     OLANZapine (zyPREXA) tablet 10 mg    Or     OLANZapine (zyPREXA) injection 10 mg     nicotine polacrilex (NICORETTE) gum 2-4 mg     ibuprofen (ADVIL/MOTRIN) tablet 400 mg             Allergies:   No Known Allergies         Psychiatric Examination:   BP (!) 172/113  Pulse 88  Temp 99.1  F (37.3  C)  Resp 16  Ht 1.778 m (5' 10\")  Wt 83.4 kg (183 lb 14.4 oz)  BMI 26.39 kg/m2  Weight is 183 lbs 14.4 oz  Body mass index is 26.39 kg/(m^2).    Appearance:  awake, alert, somewhat disheveled  Attitude:  guarded  Eye Contact:  good  Mood:  \"good\" less depressed, somewhat irritable  Affect: normal range  Speech:  clear, coherent  Psychomotor Behavior:  no evidence of tardive dyskinesia, dystonia, or tics  Thought Process:  linear and goal oriented  Associations:  no loose associations  Thought Content:  no evidence of suicidal ideation or homicidal ideation and no evidence of " psychotic thought  Insight:  partial  Judgment:  fair  Oriented to: date, time, person, and place  Attention Span and Concentration:  intact  Recent and Remote Memory:  intact  Language: intact  Fund of Knowledge: appropriate  Muscle Strength and Tone: normal  Gait and Station: Normal         Labs:   No results found for this or any previous visit (from the past 24 hour(s)).

## 2017-11-29 NOTE — PROGRESS NOTES
Met with patient for :60 minutes to conduct CD Assessment.  Spoke with patient's  Brenda Blackburn and left message for Natanael White in Admissions [facility].  Pt signed [facility]LENE for [facility].      Rule 25 assessment update, original Rule 25, current H&P has been sent to:   - Leonides pardo  [facility] ([phone]/fax [phone]) for funding auth/approval  - [facility] Admissions (Natanael White [phone]/fax [phone])   - [facility]  Brenda Blackburn [phone]/fax [phone].

## 2017-11-29 NOTE — PROGRESS NOTES
11/29/17 1330   Behavioral Health   Hallucinations denies / not responding to hallucinations   Orientation person: oriented;place: oriented;date: oriented;time: oriented   Memory baseline memory   Judgement impaired   Mood mood is calm   Physical Appearance/Attire attire appropriate to age and situation   Hygiene well groomed   Suicidality other (see comments)  (denied)   Self Injury (denied)   Psychomotor / Gait balanced;steady   Psycho Education   Type of Intervention structured groups   Response refuses   Activities of Daily Living   Hygiene/Grooming independent   Oral Hygiene independent   Dress street clothes   Laundry with supervision   Room Organization independent   Activity   Activity Assistance Provided independent     Pt denied SI/HI. Pt reported having no anxiety/depression. Pt was very brief, not very willing to check in. Pt attended meals but did not attend groups. No concerns or issues to report otherwise.

## 2017-11-30 PROCEDURE — 25000132 ZZH RX MED GY IP 250 OP 250 PS 637: Performed by: NURSE PRACTITIONER

## 2017-11-30 PROCEDURE — 12400007 ZZH R&B MH INTERMEDIATE UMMC

## 2017-11-30 PROCEDURE — 99231 SBSQ HOSP IP/OBS SF/LOW 25: CPT | Performed by: NURSE PRACTITIONER

## 2017-11-30 RX ADMIN — HYDROXYZINE HYDROCHLORIDE 50 MG: 50 TABLET, FILM COATED ORAL at 21:32

## 2017-11-30 RX ADMIN — TRAZODONE HYDROCHLORIDE 100 MG: 100 TABLET ORAL at 21:32

## 2017-11-30 RX ADMIN — FLUOXETINE 40 MG: 20 CAPSULE ORAL at 09:22

## 2017-11-30 RX ADMIN — MELATONIN TAB 3 MG 6 MG: 3 TAB at 21:32

## 2017-11-30 RX ADMIN — QUETIAPINE FUMARATE 400 MG: 400 TABLET ORAL at 21:32

## 2017-11-30 ASSESSMENT — ACTIVITIES OF DAILY LIVING (ADL)
ORAL_HYGIENE: INDEPENDENT
GROOMING: INDEPENDENT
DRESS: SCRUBS (BEHAVIORAL HEALTH);STREET CLOTHES
ORAL_HYGIENE: INDEPENDENT
DRESS: INDEPENDENT
GROOMING: INDEPENDENT
LAUNDRY: UNABLE TO COMPLETE

## 2017-11-30 NOTE — PROGRESS NOTES
[facility], [place]   Psychiatric Progress Note      Impression:     Sidney Varghese is a 52-year-old male admitted to [facility] on 11/22/2017.  He was admitted from [place] [facility] under MICD commitment with his stay of commitment recently vacated.  He had been hospitalized 8/7/2017 through 8/25/2017.  After completing residential CD treatment, he had been inconsistently compliant with outpatient CD treatment, and when his  called him he reported suicidal ideation.  He says that he consumed alcohol on about 10 occasions since his discharge.  The MSSA was initiated but he did not require treatment with Valium.  Zoloft was discontinued and replaced with Prozac.  Trazodone, Melatonin and Seroquel were changed from PRN to scheduled at bedtime.  Vistaril was scheduled at bedtime.  PRNs of Vistaril and Zyprexa are available.  He has been irritable.  Mood is slightly improved.  He denies suicidal ideation.           Diagnoses:     Major depressive disorder, moderate, recurrent  Panic disorder  Alcohol use disorder, severe  Nicotine use disorder  History of possible TBI  Asthma   Questionable diagnosis of lung cancer  Possible sleep apnea         Plan:     Medications:  Continue Prozac to 40mg.  Continue Seroquel, Melatonin, Trazodone, and Vistaril at bedtime.  Continue PRNs of Vistaril and Zyprexa.  Discharge meds ordered.  His girlfriend will also retrieve medications from his parents' home.    He is under MICD commitment.  Discharge to [facility] when funding is approved and there is bed availability.  He will need a remote PD.  He must follow up with his PCP regarding questionable diagnosis of lung cancer, and for a sleep study referral.      Attestation:  Patient has been seen and evaluated by me,  Sofya Spencer, APRN CNP  The patient was counseled on  nature of illness and treatment plan/options  Care was coordinated with  tx team          Interim  "History:     The patient's care was discussed with the treatment team and chart notes were reviewed.   Pt was documented as sleeping 7 hours during the overnight shift.  His mood is overall \"good\" and irritability is \"so-so.\"  Pt feels more hopeful knowing he has a plan for discharge and will likely discharge next week.  Pt continues to decline groups.           Medications:     Current Facility-Administered Medications   Medication     QUEtiapine (SEROquel) tablet 400 mg     traZODone (DESYREL) tablet 100 mg     melatonin tablet 6 mg     hydrOXYzine (ATARAX) tablet 50 mg     FLUoxetine (PROzac) capsule 40 mg     cloNIDine (CATAPRES) tablet 0.1 mg     sodium chloride (OCEAN) 0.65 % nasal spray 1 spray     albuterol (PROAIR HFA/PROVENTIL HFA/VENTOLIN HFA) Inhaler 2 puff     hydrOXYzine (ATARAX) tablet 25-50 mg     alum & mag hydroxide-simethicone (MYLANTA ES/MAALOX  ES) suspension 30 mL     magnesium hydroxide (MILK OF MAGNESIA) suspension 30 mL     bisacodyl (DULCOLAX) Suppository 10 mg     OLANZapine (zyPREXA) tablet 10 mg    Or     OLANZapine (zyPREXA) injection 10 mg     nicotine polacrilex (NICORETTE) gum 2-4 mg     ibuprofen (ADVIL/MOTRIN) tablet 400 mg             Allergies:   No Known Allergies         Psychiatric Examination:   /77  Pulse 74  Temp 97.6  F (36.4  C) (Oral)  Resp 16  Ht 1.778 m (5' 10\")  Wt 85.3 kg (188 lb 1.6 oz)  BMI 26.99 kg/m2  Weight is 188 lbs 1.6 oz  Body mass index is 26.99 kg/(m^2).    Appearance:  awake, alert, somewhat disheveled  Attitude:  guarded  Eye Contact:  good  Mood:  \"good\" less depressed, somewhat irritable  Affect: normal range  Speech:  clear, coherent  Psychomotor Behavior:  no evidence of tardive dyskinesia, dystonia, or tics  Thought Process:  linear and goal oriented  Associations:  no loose associations  Thought Content:  no evidence of suicidal ideation or homicidal ideation and no evidence of psychotic thought  Insight:  partial  Judgment:  " fair  Oriented to: date, time, person, and place  Attention Span and Concentration:  intact  Recent and Remote Memory:  intact  Language: intact  Fund of Knowledge: appropriate  Muscle Strength and Tone: normal  Gait and Station: Normal         Labs:   No results found for this or any previous visit (from the past 24 hour(s)).

## 2017-11-30 NOTE — PROGRESS NOTES
Voice mail from Rahul Mclaughlin at [place] pre-admit regarding the remote PD. [phone]    [facility] called back, left message that we are simply waiting for confirmation that he's been accepted into the treatment program.

## 2017-11-30 NOTE — PROGRESS NOTES
Pt visible, social in milieu all shift. Irritable at times, and dismissive of vitals unless needed for medications. Refused a check in, but no SI, SIB stated/observed. Waiting to leave, otherwise unremarkable day.     11/29/17 2157   Behavioral Health   Hallucinations denies / not responding to hallucinations   Thinking intact   Orientation person: oriented;place: oriented;date: oriented;time: oriented   Memory baseline memory   Insight poor   Judgement impaired   Eye Contact at examiner   Affect blunted, flat   Mood mood is calm;irritable   Physical Appearance/Attire attire appropriate to age and situation   Hygiene well groomed   Suicidality other (see comments)  (denies)   1. Wish to be Dead No   2. Non-Specific Active Suicidal Thoughts  No   3. Active Sucidal Ideation with any Methods (Not Plan) Without Intent to Act  No   4. Active Suicidal Ideation with Some Intent to Act, Without Specific Plan  No   5. Active Suicidal Ideation with Specific Plan and Intent  No   Self Injury other (see comment)  (denies)   Elopement (N/A)   Activity other (see comment)  (visible/social in milieu)   Speech clear;coherent   Medication Sensitivity no stated side effects;no observed side effects   Psychomotor / Gait balanced;steady   Psycho Education   Type of Intervention 1:1 intervention   Response refuses   Activities of Daily Living   Hygiene/Grooming independent   Oral Hygiene independent   Dress independent   Laundry with supervision   Room Organization independent   Behavioral Health Interventions   Behavioral Disturbance maintain safety precautions;monitor need to revise level of observation;maintain safe secure environment;reality orientation;simple, clear language;decrease environmental stimulation;encourage clear communication of needs;redirection of intrusive behaviors;redirection of aggressive behaviors;encourage nutrition and hydration;encourage participation / independence with adls;provide emotional  support;establish therapeutic relationship;provide positive feedback for use of effective coping skills;build upon strengths;monitor need for prn medication;monitor confusion, memory loss, decision making ability and reorient / intervent as needed   Social and Therapeutic Interventions (Behavioral Disturbance) encourage socialization with peers;encourage effective boundaries with peers;encourage participation in therapeutic groups and milieu activities

## 2017-11-30 NOTE — PLAN OF CARE
"Problem: Behavioral Disturbance  Goal: Behavioral Disturbance  Signs and symptoms of listed problems will be absent or manageable by discharge or transition of care.   Outcome: Improving  \"I'm better in every way\". States doesn't really have depression or anxiety. Does not attend groups, is social with peers. Asking when is going to leave and go to [facility]? \"I know they have a bed available now\".      "

## 2017-12-01 PROCEDURE — 25000132 ZZH RX MED GY IP 250 OP 250 PS 637: Performed by: NURSE PRACTITIONER

## 2017-12-01 PROCEDURE — 12400007 ZZH R&B MH INTERMEDIATE UMMC

## 2017-12-01 PROCEDURE — 99231 SBSQ HOSP IP/OBS SF/LOW 25: CPT | Performed by: NURSE PRACTITIONER

## 2017-12-01 RX ADMIN — HYDROXYZINE HYDROCHLORIDE 50 MG: 50 TABLET, FILM COATED ORAL at 21:03

## 2017-12-01 RX ADMIN — TRAZODONE HYDROCHLORIDE 100 MG: 100 TABLET ORAL at 21:04

## 2017-12-01 RX ADMIN — FLUOXETINE 40 MG: 20 CAPSULE ORAL at 09:23

## 2017-12-01 RX ADMIN — MELATONIN TAB 3 MG 6 MG: 3 TAB at 21:03

## 2017-12-01 RX ADMIN — QUETIAPINE FUMARATE 400 MG: 400 TABLET ORAL at 21:04

## 2017-12-01 ASSESSMENT — ACTIVITIES OF DAILY LIVING (ADL)
DRESS: INDEPENDENT
LAUNDRY: WITH SUPERVISION
ORAL_HYGIENE: INDEPENDENT
GROOMING: INDEPENDENT

## 2017-12-01 NOTE — PLAN OF CARE
Problem: Behavioral Disturbance  Goal: Behavioral Disturbance  Signs and symptoms of listed problems will be absent or manageable by discharge or transition of care.   Outcome: Adequate for Discharge Date Met: 12/01/17  Patient is cooperative, med-compliant, eating 100% and visible in lounge. NO behaviors.    Patient denies SI/SIB/HI.    Nursing will continue to monitor.

## 2017-12-01 NOTE — PROGRESS NOTES
[facility], [place]   Psychiatric Progress Note      Impression:     Sidney Varghese is a 52-year-old male admitted to [facility] on 11/22/2017.  He was admitted from [place] [facility] under MICD commitment with his stay of commitment recently vacated.  He had been hospitalized 8/7/2017 through 8/25/2017.  After completing residential CD treatment, he had been inconsistently compliant with outpatient CD treatment, and when his  called him he reported suicidal ideation.  He says that he consumed alcohol on about 10 occasions since his discharge.  The MSSA was initiated but he did not require treatment with Valium.  Zoloft was discontinued and replaced with Prozac.  Trazodone, Melatonin and Seroquel were changed from PRN to scheduled at bedtime.  Vistaril was scheduled at bedtime.  PRNs of Vistaril and Zyprexa are available.  He has been irritable.  Mood is slightly improved.  He denies suicidal ideation.           Diagnoses:     Major depressive disorder, moderate, recurrent  Panic disorder  Alcohol use disorder, severe  Nicotine use disorder  History of possible TBI  Asthma   Questionable diagnosis of lung cancer  Possible sleep apnea         Plan:     Medications:  Continue Prozac to 40mg.  Continue Seroquel, Melatonin, Trazodone, and Vistaril at bedtime.  Continue PRNs of Vistaril and Zyprexa.  Discharge meds ordered.  His girlfriend will also retrieve medications from his parents' home.    He is under MICD commitment.  Discharge to [facility] when funding is approved and there is bed availability.  He will need a remote PD.  He must follow up with his PCP regarding questionable diagnosis of lung cancer, and for a sleep study referral.      Attestation:  Patient has been seen and evaluated by me,  Sofya Spencer, APRN CNP  The patient was counseled on  nature of illness and treatment plan/options  Care was coordinated with  tx team          Interim  "History:     The patient's care was discussed with the treatment team and chart notes were reviewed.   Pt was documented as sleeping 7.5 hours during the overnight shift.  He has been spending time in the milieu talking to other patients, often making negative comments about staff.  When I asked to meet with him in the interview room, he said, \"I've got nothing better to do\" and acquiesced.  He said his mood is \"so-so, same shit, different day.\"  He denies SI.  When asked what he could do to avoid future hospitalizations, since it is clear that he is very dissatisfied being here, he said, \"Not answer the phone.\"  I asked whether abstaining from alcohol might also help prevent future hospitalizations, to which he replied, \"I've got a handle on it\" and made statements implying his intent to continue drinking.           Medications:     Current Facility-Administered Medications   Medication     QUEtiapine (SEROquel) tablet 400 mg     traZODone (DESYREL) tablet 100 mg     melatonin tablet 6 mg     hydrOXYzine (ATARAX) tablet 50 mg     FLUoxetine (PROzac) capsule 40 mg     cloNIDine (CATAPRES) tablet 0.1 mg     sodium chloride (OCEAN) 0.65 % nasal spray 1 spray     albuterol (PROAIR HFA/PROVENTIL HFA/VENTOLIN HFA) Inhaler 2 puff     hydrOXYzine (ATARAX) tablet 25-50 mg     alum & mag hydroxide-simethicone (MYLANTA ES/MAALOX  ES) suspension 30 mL     magnesium hydroxide (MILK OF MAGNESIA) suspension 30 mL     bisacodyl (DULCOLAX) Suppository 10 mg     OLANZapine (zyPREXA) tablet 10 mg    Or     OLANZapine (zyPREXA) injection 10 mg     nicotine polacrilex (NICORETTE) gum 2-4 mg     ibuprofen (ADVIL/MOTRIN) tablet 400 mg             Allergies:   No Known Allergies         Psychiatric Examination:   /77  Pulse 74  Temp 97.2  F (36.2  C) (Oral)  Resp 16  Ht 1.778 m (5' 10\")  Wt 85.3 kg (188 lb 1.6 oz)  BMI 26.99 kg/m2  Weight is 188 lbs 1.6 oz  Body mass index is 26.99 kg/(m^2).    Appearance:  awake, alert, " "somewhat disheveled  Attitude:  guarded  Eye Contact:  good  Mood:  \"so so\"  Affect: irritable  Speech:  clear, coherent  Psychomotor Behavior:  no evidence of tardive dyskinesia, dystonia, or tics  Thought Process:  linear and goal oriented  Associations:  no loose associations  Thought Content:  no evidence of suicidal ideation or homicidal ideation and no evidence of psychotic thought  Insight:  partial  Judgment:  fair  Oriented to: date, time, person, and place  Attention Span and Concentration:  intact  Recent and Remote Memory:  intact  Language: intact  Fund of Knowledge: appropriate  Muscle Strength and Tone: normal  Gait and Station: Normal         Labs:   No results found for this or any previous visit (from the past 24 hour(s)).     "

## 2017-12-01 NOTE — PROGRESS NOTES
[facility] called Rahul Mclaughlin at [place] pre-admit regarding the remote PD. [phone]. Discussed the remote PD timing. Rahul can do the PD on Thursday morning at 9:30.     [facility] called Natanael White, [phone], at  [facility]. Left message asking for information as to where pt is in the admit process.    [facility] called Leonieds at [facility], [phone]. 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 Directed [facility] to fax the assessment to the chemical health department at [phone]. Also advised that the fax number used earlier is defunct. [facility] faxed the needed information.     [facility] met with pt to give him an update. Pt irritable, stated that his insurance has nothing to do with anything and that [facility] has to pay for everything. [facility] attempted to explain the difference between commitment and financial accountability; pt just began swearing.

## 2017-12-01 NOTE — PROGRESS NOTES
"Pt visible in milieu all shift watching TV and socializing with peers.  Pt refused 1:1 check in, irritable on approach stated \"I am fine\".     11/30/17 5541   Behavioral Health   Hallucinations denies / not responding to hallucinations   Thinking poor concentration   Orientation time: oriented;date: oriented;place: oriented;person: oriented   Memory baseline memory   Insight poor;denial of illness   Judgement impaired   Eye Contact at examiner   Affect full range affect;irritable   Mood irritable   Physical Appearance/Attire untidy   Hygiene neglected grooming - unclean body, hair, teeth   Suicidality other (see comments)  (CHELSI pt refused 1:1 check in.)   Self Injury other (see comment)  (CHELSI pt refused 1:1 check in.)   Activity other (see comment)  (Visible in milieu , social with peers.)   Speech clear;coherent   Medication Sensitivity no stated side effects;no observed side effects   Psychomotor / Gait balanced;steady   Psycho Education   Type of Intervention (Pt refused)   Response refuses   Treatment Detail Pt refused 1:1 check in.   Activities of Daily Living   Hygiene/Grooming independent   Oral Hygiene independent   Dress scrubs (behavioral health);street clothes   Laundry unable to complete   Room Organization independent   Activity   Activity Assistance Provided independent   Behavioral Health Interventions   Behavioral Disturbance maintain safety precautions     "

## 2017-12-02 PROCEDURE — 12400007 ZZH R&B MH INTERMEDIATE UMMC

## 2017-12-02 PROCEDURE — 25000132 ZZH RX MED GY IP 250 OP 250 PS 637: Performed by: NURSE PRACTITIONER

## 2017-12-02 RX ADMIN — MELATONIN TAB 3 MG 6 MG: 3 TAB at 20:43

## 2017-12-02 RX ADMIN — HYDROXYZINE HYDROCHLORIDE 50 MG: 50 TABLET, FILM COATED ORAL at 20:43

## 2017-12-02 RX ADMIN — QUETIAPINE FUMARATE 400 MG: 400 TABLET ORAL at 20:43

## 2017-12-02 RX ADMIN — FLUOXETINE 40 MG: 20 CAPSULE ORAL at 08:43

## 2017-12-02 RX ADMIN — TRAZODONE HYDROCHLORIDE 100 MG: 100 TABLET ORAL at 20:43

## 2017-12-02 ASSESSMENT — ACTIVITIES OF DAILY LIVING (ADL)
ORAL_HYGIENE: INDEPENDENT
ORAL_HYGIENE: INDEPENDENT
LAUNDRY: WITH SUPERVISION
GROOMING: INDEPENDENT
DRESS: STREET CLOTHES
HYGIENE/GROOMING: HANDWASHING;SHOWER;INDEPENDENT
LAUNDRY: WITH SUPERVISION
DRESS: STREET CLOTHES;INDEPENDENT

## 2017-12-03 PROCEDURE — 12400007 ZZH R&B MH INTERMEDIATE UMMC

## 2017-12-03 PROCEDURE — 25000132 ZZH RX MED GY IP 250 OP 250 PS 637: Performed by: NURSE PRACTITIONER

## 2017-12-03 RX ADMIN — QUETIAPINE FUMARATE 400 MG: 400 TABLET ORAL at 21:21

## 2017-12-03 RX ADMIN — MELATONIN TAB 3 MG 6 MG: 3 TAB at 21:21

## 2017-12-03 RX ADMIN — HYDROXYZINE HYDROCHLORIDE 50 MG: 50 TABLET, FILM COATED ORAL at 21:21

## 2017-12-03 RX ADMIN — TRAZODONE HYDROCHLORIDE 100 MG: 100 TABLET ORAL at 21:21

## 2017-12-03 RX ADMIN — FLUOXETINE 40 MG: 20 CAPSULE ORAL at 10:22

## 2017-12-03 ASSESSMENT — ACTIVITIES OF DAILY LIVING (ADL)
LAUNDRY: WITH SUPERVISION
SWALLOWING: 0-->SWALLOWS FOODS/LIQUIDS WITHOUT DIFFICULTY
AMBULATION: 0-->INDEPENDENT
BATHING: 0-->INDEPENDENT
ORAL_HYGIENE: INDEPENDENT
RETIRED_COMMUNICATION: 0-->UNDERSTANDS/COMMUNICATES WITHOUT DIFFICULTY
DRESS: 0-->INDEPENDENT
DRESS: SCRUBS (BEHAVIORAL HEALTH);INDEPENDENT
COGNITION: 0 - NO COGNITION ISSUES REPORTED
RETIRED_EATING: 0-->INDEPENDENT
ORAL_HYGIENE: INDEPENDENT
GROOMING: INDEPENDENT
TOILETING: 0-->INDEPENDENT
DRESS: INDEPENDENT
HYGIENE/GROOMING: INDEPENDENT
LAUNDRY: WITH SUPERVISION
TRANSFERRING: 0-->INDEPENDENT
FALL_HISTORY_WITHIN_LAST_SIX_MONTHS: NO

## 2017-12-03 NOTE — PROGRESS NOTES
"Pt was active in milieu and appropriate with staff and peers. Pt appeared guarded and irritable during 1:1 check in. Pt appeared frustrated with his hospitalization and stated \"I'm ready for treatment\". Pt denies SI and SIB.        12/02/17 2263   Behavioral Health   Hallucinations denies / not responding to hallucinations   Thinking distractable   Orientation person: oriented;place: oriented;date: oriented;time: oriented   Memory baseline memory   Insight poor   Judgement impaired   Eye Contact at examiner   Affect irritable   Mood mood is calm   Physical Appearance/Attire attire appropriate to age and situation   Hygiene neglected grooming - unclean body, hair, teeth   Suicidality other (see comments)  (Denies)   1. Wish to be Dead No   2. Non-Specific Active Suicidal Thoughts  No   3. Active Sucidal Ideation with any Methods (Not Plan) Without Intent to Act  No   4. Active Suicidal Ideation with Some Intent to Act, Without Specific Plan  No   5. Active Suicidal Ideation with Specific Plan and Intent  No   Self Injury other (see comment)  (None Observed)   Activity other (see comment)  (Active in milieu)   Speech clear;coherent   Medication Sensitivity no stated side effects;no observed side effects   Psychomotor / Gait balanced;steady   Coping/Psychosocial   Verbalized Emotional State frustration   Activities of Daily Living   Hygiene/Grooming handwashing;shower;independent   Oral Hygiene independent   Dress street clothes;independent   Laundry with supervision   Room Organization independent   Behavioral Health Interventions   Behavioral Disturbance maintain safety precautions;maintain safe secure environment;reality orientation;simple, clear language;decrease environmental stimulation;encourage participation / independence with adls;provide emotional support;establish therapeutic relationship;build upon strengths   Social and Therapeutic Interventions (Behavioral Disturbance) encourage socialization with " peers;encourage effective boundaries with peers;encourage participation in therapeutic groups and milieu activities

## 2017-12-03 NOTE — PLAN OF CARE
Problem: General Plan of Care (Inpatient Behavioral)  Goal: Individualization/Patient Specific Goal (IP Behavioral)  The patient and/or their representative will achieve their patient-specific goals related to the plan of care.    The patient-specific goals include:     Illness Management Recovery model: Objectives  Patient will identify reason(s) for hospitalization from their perspective.  Patient will identify a minimum of three goals for discharge.  Patient will identify a minimum of three triggers that may increase their symptoms.  Patient will identify a minimum of three coping skills they can do to stay well.   Patient will identify their support system to demonstrate readiness for discharge.    Illness Management & Recovery assists patient to develop relapse prevention as  patient identifies triggers for relapse.  patient identifies a general wellness strategy.  patient identifies the warning signs that they are in danger of relapse.  patient identifies someone they count on to get feedback .  patient identifies ways to take action when in danger of relapse.  patient identifies way to cope with stress or other symptoms.   patient participates in self-reflection.      Illness Management Recovery model:  Self-Reflection & Planning.    Assessed patient's progress completing forms related to Illness Management Recovery (including Personal Plan of Care, Adult Coping Plan, and My Support and Coping Plan) and assisted as needed.    Encouraged patient to continue to consider triggers, wellness strategies, early warning signs, feedback from others, actions to take to prevent relapse, and coping strategies as part of a plan to remain well after leaving the hospital.

## 2017-12-03 NOTE — PLAN OF CARE
"Problem: Behavioral Disturbance  Intervention: Behavioral Disturbance  RN48    Patient is cooperative and socially appropriate with peers/staff. Patient is irritable at times generally related to menu/delievry discrepancies. Patient is med-compliant, eating 100%, attends groups and states \"sleeping has improved.\" Patient denies SI/SIB.    Patient GOAL is \"CD TX at [facility] ASAP.\"    Nursing will continue to monitor.        "

## 2017-12-04 PROCEDURE — 25000132 ZZH RX MED GY IP 250 OP 250 PS 637: Performed by: NURSE PRACTITIONER

## 2017-12-04 PROCEDURE — 99231 SBSQ HOSP IP/OBS SF/LOW 25: CPT | Performed by: NURSE PRACTITIONER

## 2017-12-04 PROCEDURE — 12400007 ZZH R&B MH INTERMEDIATE UMMC

## 2017-12-04 RX ADMIN — MELATONIN TAB 3 MG 6 MG: 3 TAB at 20:50

## 2017-12-04 RX ADMIN — QUETIAPINE FUMARATE 400 MG: 400 TABLET ORAL at 20:50

## 2017-12-04 RX ADMIN — FLUOXETINE 40 MG: 20 CAPSULE ORAL at 09:33

## 2017-12-04 RX ADMIN — TRAZODONE HYDROCHLORIDE 100 MG: 100 TABLET ORAL at 20:50

## 2017-12-04 RX ADMIN — HYDROXYZINE HYDROCHLORIDE 50 MG: 50 TABLET, FILM COATED ORAL at 20:50

## 2017-12-04 ASSESSMENT — ACTIVITIES OF DAILY LIVING (ADL)
GROOMING: INDEPENDENT
GROOMING: INDEPENDENT
DRESS: INDEPENDENT
ORAL_HYGIENE: INDEPENDENT
DRESS: INDEPENDENT
LAUNDRY: WITH SUPERVISION
ORAL_HYGIENE: INDEPENDENT

## 2017-12-04 NOTE — PROGRESS NOTES
VM from Clemencia Issa at [facility] regarding the CD assessment requesting residential CD treatment. Would like to discuss as [facility] is out of network and he does not have out of network benefits. [phone]    [facility] called pt , Brenda Guerreror, [phone]. She states that the first time he went he didn't have [facility] so his insurance paid for it. She is ok with him going elsewhere. [facility] has openings but said no because of his suicide attempts.     [facility] called Clemencia at [facility]. Left a message that there is no specific, cultural reason pt has to go to to [facility]. OK with another residential program. Requested call back with referral.     Call back from Clemencia. Discussed [facility] and [facility]. She will authorize the initial 21 days for [facility].     [facility] faxed the assessment and request to [facility] at [phone]. Called and advised Brenda of this.     Call from Lucy at [place]. Requested copy of the court commitment. [facility] sent it. She also reported the following wait times:  [facility]: 2 week wait  [facility] and [place]: 4 weeks        [facility] called , advised concerning this.

## 2017-12-04 NOTE — PROGRESS NOTES
Patient was visible and social in the milieu with other patients.  He did not participate in any groups and maintained a negative posture towards anything group related.  The patient mentioned to other patients that he was getting fed up with this place and was eventually going to hurt someone.  He also made inappropriate gestures and had to be redirected several times during the shift.  The patient refused to check-in.       12/04/17 1400   Behavioral Health   Hallucinations denies / not responding to hallucinations   Thinking distractable   Orientation person: oriented;place: oriented;date: oriented;time: oriented   Memory baseline memory   Insight poor   Judgement impaired   Eye Contact at examiner   Affect irritable;tense   Mood labile;irritable   Physical Appearance/Attire disheveled   Hygiene other (see comment)  (Adequate)   Suicidality other (see comments)  (Denies)   1. Wish to be Dead No   3. Active Sucidal Ideation with any Methods (Not Plan) Without Intent to Act  No   4. Active Suicidal Ideation with Some Intent to Act, Without Specific Plan  No   5. Active Suicidal Ideation with Specific Plan and Intent  No   Self Injury other (see comment)  (Denies)   Activity other (see comment)  (Active and visible in milieu)   Speech clear   Medication Sensitivity no stated side effects;no observed side effects   Psychomotor / Gait balanced;steady   Activities of Daily Living   Hygiene/Grooming independent   Oral Hygiene independent   Dress independent   Room Organization independent   Activity   Activity Assistance Provided independent   Behavioral Health Interventions   Behavioral Disturbance maintain safety precautions;monitor need to revise level of observation;reality orientation;decrease environmental stimulation;simple, clear language;redirection of aggressive behaviors;redirection of intrusive behaviors;encourage nutrition and hydration;encourage participation / independence with adls;provide emotional  support;build upon strengths;provide positive feedback for use of effective coping skills   Social and Therapeutic Interventions (Behavioral Disturbance) encourage effective boundaries with peers;encourage participation in therapeutic groups and milieu activities;encourage socialization with peers

## 2017-12-04 NOTE — PLAN OF CARE
Problem: Patient Care Overview  Goal: Team Discussion  Team Plan:   BEHAVIORAL TEAM DISCUSSION    Participants: Provider: Beth Spencer NP    CTC: Dejah Lamb MS,LP   Nurse: Aurora Claros RN,  Psych Associate: Izabela Manning      Progress: Pt was admitted following revocation of his stay of commitment. Pt is stable but irritable, argumentative. Pt is on a commitment, residential chemical dependency treatment is being sought. Pt had change of insurance and so cannot return to [facility].     Continued Stay Criteria/Rationale: PT is on commitment, cannot discharge without placement. Pt will relapse, likely be rehospitalized with discharge if there is not a sufficient plan.  Medical/Physical: see chart  Precautions:   Behavioral Orders   Procedures     Assault precautions     Code 1 - Restrict to Unit     Routine Programming     As clinically indicated     Status 15     Every 15 minutes.     Plan: The patient will continue to meet w/ the treatment team for assessment and treatment planning, CTC will continue to work w/ for discharge planning.    Rationale for change in precautions or plan: Pt awaiting treatment, is on commitment.

## 2017-12-04 NOTE — PROGRESS NOTES
Pt spent the shift socializing with peers in the milieu. Pt ate dinner. Pt had appropriate behaviors with staff and others.      12/03/17 2135   Behavioral Health   Hallucinations denies / not responding to hallucinations   Thinking intact   Orientation time: oriented;date: oriented;place: oriented;person: oriented   Memory baseline memory   Insight insight appropriate to events;insight appropriate to situation   Judgement intact   Eye Contact at examiner   Affect full range affect   Mood mood is calm   Physical Appearance/Attire attire appropriate to age and situation   Hygiene well groomed   Suicidality other (see comments)  (none reported)   Self Injury other (see comment)  (none reported/none observed)   Elopement (no observed behaviors)   Activity other (see comment)  (social)   Speech clear;coherent   Medication Sensitivity no observed side effects;no stated side effects   Psychomotor / Gait balanced;steady   Activities of Daily Living   Hygiene/Grooming independent   Oral Hygiene independent   Dress scrubs (behavioral health);independent   Laundry with supervision   Room Organization independent   Activity   Activity Assistance Provided independent   Behavioral Health Interventions   Behavioral Disturbance maintain safety precautions;monitor need to revise level of observation;maintain safe secure environment;reality orientation;simple, clear language;decrease environmental stimulation;assist in development of alternative methods of expressive communication;redirection of intrusive behaviors;assist patient in developing safety plan;redirection of aggressive behaviors;encourage clear communication of needs;assist with developing & utilizing healthy coping strategies;establish therapeutic relationship;provide emotional support;encourage participation / independence with adls;encourage nutrition and hydration;monitor confusion, memory loss, decision making ability and reorient / intervent as needed;monitor need  for prn medication;assess medication adherance;assess patient response to medication;build upon strengths;provide positive feedback for use of effective coping skills   Social and Therapeutic Interventions (Behavioral Disturbance) encourage participation in therapeutic groups and milieu activities;encourage effective boundaries with peers;encourage socialization with peers

## 2017-12-04 NOTE — PROGRESS NOTES
[facility], [place]   Psychiatric Progress Note      Impression:     Sidney Varghese is a 52-year-old male admitted to [facility] on 11/22/2017.  He was admitted from [place] ED under MICD commitment with his stay of commitment recently vacated.  He had been hospitalized 8/7/2017 through 8/25/2017.  After completing residential CD treatment, he had been inconsistently compliant with outpatient CD treatment, and when his  called him he reported suicidal ideation.  He says that he consumed alcohol on about 10 occasions since his discharge.  The MSSA was initiated but he did not require treatment with Valium.  Zoloft was discontinued and replaced with Prozac.  Trazodone, Melatonin and Seroquel were changed from PRN to scheduled at bedtime.  Vistaril was scheduled at bedtime.  PRNs of Vistaril and Zyprexa are available.  He has been irritable.  Mood is slightly improved.  He denies suicidal ideation.           Diagnoses:     Major depressive disorder, moderate, recurrent  Panic disorder  Alcohol use disorder, severe  Nicotine use disorder  History of possible TBI  Asthma   Questionable diagnosis of lung cancer  Possible sleep apnea         Plan:     Medications:  Continue Prozac to 40mg.  Continue Seroquel, Melatonin, Trazodone, and Vistaril at bedtime.  Continue PRNs of Vistaril and Zyprexa.  Discharge meds ordered.  His girlfriend will also retrieve medications from his parents' home.    He is under MICD commitment.  Discharge to [facility] when funding is approved and there is bed availability.  He will need a remote PD.  He must follow up with his PCP regarding questionable diagnosis of lung cancer, and for a sleep study referral.      Attestation:  Patient has been seen and evaluated by me,  Sofya Spencer, APRN CNP  The patient was counseled on  nature of illness and treatment plan/options  Care was coordinated with  tx team          Interim  "History:     The patient's care was discussed with the treatment team and chart notes were reviewed.   Pt was documented as sleeping 7.5 hours during each of the three weekend overnight shifts, but he reports sleeping poorly.  He has been spending time in the milieu but not attending groups.  States he will \"maybe\" follow up with his PCP regarding a sleep study.  His mood is \"so-so.\"  He has been irritable.  He is eager to discharge and views CD treatment as \"a vacation\" away from work.  Denies side effects from meds.           Medications:     Current Facility-Administered Medications   Medication     QUEtiapine (SEROquel) tablet 400 mg     traZODone (DESYREL) tablet 100 mg     melatonin tablet 6 mg     hydrOXYzine (ATARAX) tablet 50 mg     FLUoxetine (PROzac) capsule 40 mg     cloNIDine (CATAPRES) tablet 0.1 mg     sodium chloride (OCEAN) 0.65 % nasal spray 1 spray     albuterol (PROAIR HFA/PROVENTIL HFA/VENTOLIN HFA) Inhaler 2 puff     hydrOXYzine (ATARAX) tablet 25-50 mg     alum & mag hydroxide-simethicone (MYLANTA ES/MAALOX  ES) suspension 30 mL     magnesium hydroxide (MILK OF MAGNESIA) suspension 30 mL     bisacodyl (DULCOLAX) Suppository 10 mg     OLANZapine (zyPREXA) tablet 10 mg    Or     OLANZapine (zyPREXA) injection 10 mg     nicotine polacrilex (NICORETTE) gum 2-4 mg     ibuprofen (ADVIL/MOTRIN) tablet 400 mg             Allergies:   No Known Allergies         Psychiatric Examination:   /88 (BP Location: Left arm)  Pulse 79  Temp 98.2  F (36.8  C) (Oral)  Resp 18  Ht 1.778 m (5' 10\")  Wt 89.4 kg (197 lb)  BMI 28.27 kg/m2  Weight is 197 lbs 0 oz  Body mass index is 28.27 kg/(m^2).    Appearance:  awake, alert, somewhat disheveled  Attitude:  guarded, sarcastic   Eye Contact:  good  Mood:  \"so so\"  Affect: irritable  Speech:  clear, coherent  Psychomotor Behavior:  no evidence of tardive dyskinesia, dystonia, or tics  Thought Process:  linear and goal oriented  Associations:  no loose " associations  Thought Content:  no evidence of suicidal ideation or homicidal ideation and no evidence of psychotic thought  Insight:  partial  Judgment:  fair  Oriented to: date, time, person, and place  Attention Span and Concentration:  intact  Recent and Remote Memory:  intact  Language: intact  Fund of Knowledge: appropriate  Muscle Strength and Tone: normal  Gait and Station: Normal         Labs:   No results found for this or any previous visit (from the past 24 hour(s)).

## 2017-12-05 PROCEDURE — 12400007 ZZH R&B MH INTERMEDIATE UMMC

## 2017-12-05 PROCEDURE — 25000132 ZZH RX MED GY IP 250 OP 250 PS 637: Performed by: NURSE PRACTITIONER

## 2017-12-05 PROCEDURE — 99233 SBSQ HOSP IP/OBS HIGH 50: CPT | Performed by: NURSE PRACTITIONER

## 2017-12-05 PROCEDURE — 99207 ZZC CDG-MDM COMPONENT: MEETS MODERATE - UP CODED: CPT | Performed by: NURSE PRACTITIONER

## 2017-12-05 RX ADMIN — HYDROXYZINE HYDROCHLORIDE 50 MG: 50 TABLET, FILM COATED ORAL at 20:36

## 2017-12-05 RX ADMIN — QUETIAPINE FUMARATE 400 MG: 400 TABLET ORAL at 20:35

## 2017-12-05 RX ADMIN — MELATONIN TAB 3 MG 6 MG: 3 TAB at 20:35

## 2017-12-05 RX ADMIN — FLUOXETINE 40 MG: 20 CAPSULE ORAL at 09:24

## 2017-12-05 RX ADMIN — TRAZODONE HYDROCHLORIDE 100 MG: 100 TABLET ORAL at 20:35

## 2017-12-05 ASSESSMENT — ACTIVITIES OF DAILY LIVING (ADL)
DRESS: STREET CLOTHES
ORAL_HYGIENE: INDEPENDENT
ORAL_HYGIENE: INDEPENDENT
GROOMING: INDEPENDENT
GROOMING: INDEPENDENT
LAUNDRY: WITH SUPERVISION

## 2017-12-05 NOTE — PROGRESS NOTES
"[facility], [place]   Psychiatric Progress Note      Impression:     Sidney Varghese is a 52-year-old male admitted to [facility] on 11/22/2017.  He was admitted from [place] ED under MICD commitment with his stay of commitment recently vacated.  He had been hospitalized 8/7/2017 through 8/25/2017.  After completing residential CD treatment, he had been inconsistently compliant with outpatient CD treatment, and when his  called him he reported suicidal ideation.  He says that he consumed alcohol on about 10 occasions since his discharge.  The MSSA was initiated but he did not require treatment with Valium.  Zoloft was discontinued and replaced with Prozac.  Trazodone, Melatonin and Seroquel were changed from PRN to scheduled at bedtime.  Vistaril was scheduled at bedtime.  PRNs of Vistaril and Zyprexa are available.  He has been irritable and has made threats to \"go off\" and harm others.  He denies suicidal ideation.           Diagnoses:     Major depressive disorder, moderate, recurrent  Panic disorder  Alcohol use disorder, severe  Nicotine use disorder  History of possible TBI  Asthma   Questionable diagnosis of lung cancer  Possible sleep apnea         Plan:     Medications:  Continue Prozac to 40mg.  Continue Seroquel, Melatonin, Trazodone, and Vistaril at bedtime.  Continue PRNs of Vistaril and Zyprexa.  Discharge meds ordered.  His girlfriend will also retrieve medications from his parents' home.    He is under MICD commitment.  Discharge to [place] at noon on Monday 12/11.  He will need a remote PD.  He must follow up with his PCP regarding questionable diagnosis of lung cancer, and for a sleep study referral.      Attestation:  Patient has been seen and evaluated by me,  Sofya Spencer, JAMIA CNP  The patient was counseled on  nature of illness and treatment plan/options  Care was coordinated with  tx team          Interim History:     The " "patient's care was discussed with the treatment team and chart notes were reviewed.   Pt was documented as sleeping 7 hours during the overnight shift.  Staff report that he has been agitated and has made threats to \"go off\" and to harm others.  He has made a number of derogatory statements about staff.  When I approached him in the lounge today and invited him to talk, he said, \"Nothing to talk about.  Same shit, different day.\"           Medications:     Current Facility-Administered Medications   Medication     QUEtiapine (SEROquel) tablet 400 mg     traZODone (DESYREL) tablet 100 mg     melatonin tablet 6 mg     hydrOXYzine (ATARAX) tablet 50 mg     FLUoxetine (PROzac) capsule 40 mg     cloNIDine (CATAPRES) tablet 0.1 mg     sodium chloride (OCEAN) 0.65 % nasal spray 1 spray     albuterol (PROAIR HFA/PROVENTIL HFA/VENTOLIN HFA) Inhaler 2 puff     hydrOXYzine (ATARAX) tablet 25-50 mg     alum & mag hydroxide-simethicone (MYLANTA ES/MAALOX  ES) suspension 30 mL     magnesium hydroxide (MILK OF MAGNESIA) suspension 30 mL     bisacodyl (DULCOLAX) Suppository 10 mg     OLANZapine (zyPREXA) tablet 10 mg    Or     OLANZapine (zyPREXA) injection 10 mg     nicotine polacrilex (NICORETTE) gum 2-4 mg     ibuprofen (ADVIL/MOTRIN) tablet 400 mg             Allergies:   No Known Allergies         Psychiatric Examination:   BP (!) 137/91 (BP Location: Left arm)  Pulse 76  Temp 98.1  F (36.7  C) (Oral)  Resp 16  Ht 1.778 m (5' 10\")  Wt 86.6 kg (191 lb)  BMI 27.41 kg/m2  Weight is 191 lbs 0 oz  Body mass index is 27.41 kg/(m^2).    Appearance:  awake, alert, somewhat disheveled  Attitude:  guarded, sarcastic   Eye Contact:  good  Mood:  Unable to thoroughly assess due to lack of participation in the assessment  Affect: irritable  Speech:  clear, coherent  Psychomotor Behavior:  no evidence of tardive dyskinesia, dystonia, or tics  Thought Process:  Unable to thoroughly assess due to lack of participation in the " assessment  Associations:  Unable to thoroughly assess due to lack of participation in the assessment  Thought Content:  Unable to thoroughly assess due to lack of participation in the assessment  Insight:  partial  Judgment:  fair  Oriented to: Unable to thoroughly assess due to lack of participation in the assessment  Attention Span and Concentration:  Unable to thoroughly assess due to lack of participation in the assessment  Recent and Remote Memory:  Unable to thoroughly assess due to lack of participation in the assessment  Language: intact  Fund of Knowledge: appropriate  Muscle Strength and Tone: normal  Gait and Station: Normal         Labs:   No results found for this or any previous visit (from the past 24 hour(s)).

## 2017-12-05 NOTE — PROGRESS NOTES
Call from Shannon at [place], [phone]. Can do Monday 12/11/2017; noon at [place]. Will need 30 day meds. Needs the H&P and MAR to Shannon, [phone]    [facility] called Rahul Mclaughlin at [place] pre-admit regarding the remote PD. [phone]. Advised that pt will be discharging 12/11 so need remote PD. She will be here on Thursday.     [facility] called Brenda Blackburn, [phone]. Left message that pt will discharge Monday to [place]; that a remote PD will be done on Thursday. Also advised that pt will be moving to a different unit due to this unit closing 12/11.

## 2017-12-05 NOTE — PROGRESS NOTES
RN 48 hour assessment:    Patient did not have any behavioral outbursts so far today. He has been visible in the milieu, but did not attend group activities today. He continues to wait for a bed at  treatment. He did not wish to engage with staff in a 1:1 check in.

## 2017-12-05 NOTE — PROGRESS NOTES
[facility] called Rahul Mclaughlin at [place] pre-admit regarding the remote PD. [phone]. Left message that pt is not going to [facility] and so discharge is delayed. Asked if they are willing to do the remote PD ahead of time given the difficulty of planning this when waiting for CD treatment.

## 2017-12-05 NOTE — DISCHARGE INSTRUCTIONS
Behavioral Discharge Planning and Instructions      Summary:  You were admitted on 11/22/2017  due to violation of your commitment and substance Use Disorder.  You were treated by Beth Spencer NP and discharged on 12/11/2017 from [facility] to chemical dependency treatment at [facility].    You were committed to [facility] on 8/23/2017 and you were discharged on a stay of commitment which was revoked 11/22/2017. You are now  being discharged on a Provisional Discharge Agreement which shall remain in effect for the duration of the Commitment which expires on 5/22/2018.         Principal Diagnosis:   Major depressive disorder, moderate, recurrent  Panic disorder  Alcohol use disorder, severe  Nicotine use disorder    Health Care Follow-up Appointments:     You are being discharged to [facility]  [address] [place] [address]  Shannon at [facility]  [phone]. Shannon and Ilsa are also requesting we fax current MAR, Provisional Discharge, and H&P to [facility] upon d/c. Fax number: [phone].      :  Brenda Blackburn from [facility] Phone: [phone]  Fax: [phone]     You are discharging with a 30 day supply of your medications, after you have settled at the treatment center, please make an appointment with your PCP or psychiatrist.   Primary Care Provider is:   [facility]  [address] [place] [address]  Phone: [phone]  Fax: [phone]    Attend all scheduled appointments with your outpatient providers. Call at least 24 hours in advance if you need to reschedule an appointment to ensure continued access to your outpatient providers.   Major Treatments, Procedures and Findings:  You were provided with: a psychiatric assessment, medication evaluation and/or management, group therapy, CD evaluation/assessment and milieu management    Symptoms to Report: feeling more aggressive, increased  "confusion, losing more sleep, mood getting worse or thoughts of suicide    Early warning signs can include: increased depression or anxiety sleep disturbances increased thoughts or behaviors of suicide or self-harm  increased unusual thinking, such as paranoia or hearing voices    Safety and Wellness:  Take all medicines as directed.  Make no changes unless your doctor suggests them.      Follow treatment recommendations.  Refrain from alcohol and non-prescribed drugs.  If there is a concern for safety, call 911.    Resources:   [place] Crisis: 526.833.2427    National [place] on Mental Illness (www.[place].brissa.org): 872.692.3823 or 452-000-3986.  Alcoholics Anonymous (www.alcoholics-anonymous.org): Check your phone book for your local chapter.  Suicide Awareness Voices of Education (SAVE) (www.save.org): 333-732-COZF (1574)  National Suicide Prevention Line (www.mentalhealthmn.org): 538-420-VAKZ (6836)  Mental Health Consumer/Survivor Network of [place] (www.mhcsn.net): 847.661.1907 or 126-125-1352  Mental Health Association of [place] (www.mentalhealth.org): 806.421.4436 or 578-439-0601  Self- Management and Recovery Training., [employer]-- Toll free: 674.698.8185  www.Teliris.[employer]Logic  Text 4 Life: txt \"LIFE\" to 01688 for immediate support and crisis intervention  Crisis text line: Text \"START\" to 176-537. Free, confidential, 24/7.  Crisis Intervention: 540.703.7226 or 933-265-3067. Call anytime for help.     The treatment team has appreciated the opportunity to work with you.     If you have any questions or concerns our unit number is [phone]        "

## 2017-12-05 NOTE — PROGRESS NOTES
"Patient was up visible out in the milieu. Social with peers and maintained a negative comment about unit and staffs. On approach for 1:1 pt was irritable and agitated toward this writer and stated \"I only get to see my fucking doctor for few minutes but then, I get to answer same questions over and over to you guys, judk no! I am not checking in with you\". Pt request was respected and this writer left him alone. Pt continues spreading negative comment about this place and his doctor, and while using profanity out loud. Pt was redirected several times and but continues being inappropriate. Pt paused, stared at this writer and stated \"you never seen me go off and believe me I am getting there\". This writer tried to redirect pt and pt stated \"this is a promise\". Pt kept to self and continued watching tv throughout the rest of shift.   "

## 2017-12-06 PROCEDURE — 25000132 ZZH RX MED GY IP 250 OP 250 PS 637: Performed by: NURSE PRACTITIONER

## 2017-12-06 PROCEDURE — 12400007 ZZH R&B MH INTERMEDIATE UMMC

## 2017-12-06 PROCEDURE — 99233 SBSQ HOSP IP/OBS HIGH 50: CPT | Performed by: NURSE PRACTITIONER

## 2017-12-06 PROCEDURE — 99207 ZZC CDG-MDM COMPONENT: MEETS MODERATE - UP CODED: CPT | Performed by: NURSE PRACTITIONER

## 2017-12-06 RX ADMIN — FLUOXETINE 40 MG: 20 CAPSULE ORAL at 09:44

## 2017-12-06 RX ADMIN — HYDROXYZINE HYDROCHLORIDE 50 MG: 50 TABLET, FILM COATED ORAL at 20:59

## 2017-12-06 RX ADMIN — QUETIAPINE FUMARATE 400 MG: 400 TABLET ORAL at 20:59

## 2017-12-06 RX ADMIN — TRAZODONE HYDROCHLORIDE 100 MG: 100 TABLET ORAL at 20:59

## 2017-12-06 RX ADMIN — MELATONIN TAB 3 MG 6 MG: 3 TAB at 20:59

## 2017-12-06 ASSESSMENT — ACTIVITIES OF DAILY LIVING (ADL)
LAUNDRY: WITH SUPERVISION
ORAL_HYGIENE: INDEPENDENT
GROOMING: HANDWASHING;INDEPENDENT
DRESS: STREET CLOTHES;INDEPENDENT
GROOMING: INDEPENDENT
ORAL_HYGIENE: INDEPENDENT
DRESS: INDEPENDENT

## 2017-12-06 NOTE — PROGRESS NOTES
"[facility], [place]   Psychiatric Progress Note      Impression:     Sidney Varghese is a 52-year-old male admitted to [facility] on 11/22/2017.  He was admitted from [place] ED under MICD commitment with his stay of commitment recently vacated.  He had been hospitalized 8/7/2017 through 8/25/2017.  After completing residential CD treatment, he had been inconsistently compliant with outpatient CD treatment, and when his  called him he reported suicidal ideation.  He says that he consumed alcohol on about 10 occasions since his discharge.  The MSSA was initiated but he did not require treatment with Valium.  Zoloft was discontinued and replaced with Prozac.  Trazodone, Melatonin and Seroquel were changed from PRN to scheduled at bedtime.  Vistaril was scheduled at bedtime.  PRNs of Vistaril and Zyprexa are available.  He has been irritable and has made threats to \"go off\" and harm others.  He denies suicidal ideation.           Diagnoses:     Major depressive disorder, moderate, recurrent  Panic disorder  Alcohol use disorder, severe  Nicotine use disorder  History of possible TBI  Asthma   Questionable diagnosis of lung cancer  Possible sleep apnea         Plan:     Medications:  Continue Prozac to 40mg.  Continue Seroquel, Melatonin, Trazodone, and Vistaril at bedtime.  Continue PRNs of Vistaril and Zyprexa.  Discharge meds ordered.  His girlfriend will also retrieve medications from his parents' home.    He is under MICD commitment.  Discharge to [place] at noon on Monday 12/11.  He will need a remote PD.  He must follow up with his PCP regarding questionable diagnosis of lung cancer, and for a sleep study referral.      Attestation:  Patient has been seen and evaluated by me,  Sofya Spencer, JAMIA CNP  The patient was counseled on  nature of illness and treatment plan/options  Care was coordinated with  tx team          Interim History:     The " "patient's care was discussed with the treatment team and chart notes were reviewed.   Pt was documented as sleeping 7.5 hours during the overnight shift.  Pt has been spending time in the milieu.  He has been making derogatory comments about staff and refusing to speak with staff.  When I approached him in the lounge today and invited him to talk, he said, \"Ain't nothing to talk about.\"  He refused to meet.           Medications:     Current Facility-Administered Medications   Medication     QUEtiapine (SEROquel) tablet 400 mg     traZODone (DESYREL) tablet 100 mg     melatonin tablet 6 mg     hydrOXYzine (ATARAX) tablet 50 mg     FLUoxetine (PROzac) capsule 40 mg     cloNIDine (CATAPRES) tablet 0.1 mg     sodium chloride (OCEAN) 0.65 % nasal spray 1 spray     albuterol (PROAIR HFA/PROVENTIL HFA/VENTOLIN HFA) Inhaler 2 puff     hydrOXYzine (ATARAX) tablet 25-50 mg     alum & mag hydroxide-simethicone (MYLANTA ES/MAALOX  ES) suspension 30 mL     magnesium hydroxide (MILK OF MAGNESIA) suspension 30 mL     bisacodyl (DULCOLAX) Suppository 10 mg     OLANZapine (zyPREXA) tablet 10 mg    Or     OLANZapine (zyPREXA) injection 10 mg     nicotine polacrilex (NICORETTE) gum 2-4 mg     ibuprofen (ADVIL/MOTRIN) tablet 400 mg             Allergies:   No Known Allergies         Psychiatric Examination:   BP (!) 137/91 (BP Location: Left arm)  Pulse 76  Temp 97.5  F (36.4  C)  Resp 16  Ht 1.778 m (5' 10\")  Wt 86.6 kg (191 lb)  BMI 27.41 kg/m2  Weight is 191 lbs 0 oz  Body mass index is 27.41 kg/(m^2).    Appearance:  awake, alert, somewhat disheveled  Attitude:  guarded, sarcastic   Eye Contact:  good  Mood:  Unable to thoroughly assess due to lack of participation in the assessment  Affect: irritable  Speech:  clear, coherent  Psychomotor Behavior:  no evidence of tardive dyskinesia, dystonia, or tics  Thought Process:  Unable to thoroughly assess due to lack of participation in the assessment  Associations:  Unable to " thoroughly assess due to lack of participation in the assessment  Thought Content:  Unable to thoroughly assess due to lack of participation in the assessment  Insight:  partial  Judgment:  fair  Oriented to: Unable to thoroughly assess due to lack of participation in the assessment  Attention Span and Concentration:  Unable to thoroughly assess due to lack of participation in the assessment  Recent and Remote Memory:  Unable to thoroughly assess due to lack of participation in the assessment  Language: intact  Fund of Knowledge: appropriate  Muscle Strength and Tone: normal  Gait and Station: Normal         Labs:   No results found for this or any previous visit (from the past 24 hour(s)).

## 2017-12-06 NOTE — PROGRESS NOTES
Pt awake entire shift.  Pt ate meals, did not attend groups, and is social with select others.  Pt has poor boundaries, low motivation, and poor insight.  Pt out in milieu most of the shift.  Pt has inappropriate statements at times.  Discharge 12/11.       12/06/17 1401   Behavioral Health   Hallucinations denies / not responding to hallucinations   Thinking intact   Orientation person: oriented;place: oriented;date: oriented;time: oriented   Memory baseline memory   Insight poor   Judgement impaired   Eye Contact at examiner   Affect full range affect   Mood irritable   Physical Appearance/Attire disheveled   Hygiene neglected grooming - unclean body, hair, teeth   Suicidality other (see comments)  (denies)   Activity other (see comment)  (out in milieu)   Speech clear;coherent   Psychomotor / Gait balanced;steady   Sleep/Rest/Relaxation   Day/Evening Time Hours up all shift   Coping/Psychosocial   Verbalized Emotional State frustration;powerlessness   Plan Of Care Reviewed With patient   Psycho Education   Type of Intervention 1:1 intervention   Response refuses   Activities of Daily Living   Hygiene/Grooming handwashing;independent   Oral Hygiene independent   Dress street clothes;independent   Activity   Activity Assistance Provided independent   Behavioral Health Interventions   Behavioral Disturbance assist with developing & utilizing healthy coping strategies;assess patient response to medication   Social and Therapeutic Interventions (Behavioral Disturbance) encourage effective boundaries with peers;encourage participation in therapeutic groups and milieu activities

## 2017-12-07 PROCEDURE — 12400007 ZZH R&B MH INTERMEDIATE UMMC

## 2017-12-07 PROCEDURE — 99232 SBSQ HOSP IP/OBS MODERATE 35: CPT | Performed by: NURSE PRACTITIONER

## 2017-12-07 PROCEDURE — 99207 ZZC CDG-MDM COMPONENT: MEETS MODERATE - UP CODED: CPT | Performed by: NURSE PRACTITIONER

## 2017-12-07 PROCEDURE — 25000132 ZZH RX MED GY IP 250 OP 250 PS 637: Performed by: NURSE PRACTITIONER

## 2017-12-07 RX ADMIN — MELATONIN TAB 3 MG 6 MG: 3 TAB at 21:17

## 2017-12-07 RX ADMIN — QUETIAPINE FUMARATE 400 MG: 400 TABLET ORAL at 21:18

## 2017-12-07 RX ADMIN — HYDROXYZINE HYDROCHLORIDE 50 MG: 50 TABLET, FILM COATED ORAL at 21:18

## 2017-12-07 RX ADMIN — TRAZODONE HYDROCHLORIDE 100 MG: 100 TABLET ORAL at 21:17

## 2017-12-07 RX ADMIN — FLUOXETINE 40 MG: 20 CAPSULE ORAL at 08:17

## 2017-12-07 ASSESSMENT — ACTIVITIES OF DAILY LIVING (ADL)
GROOMING: INDEPENDENT
GROOMING: INDEPENDENT
ORAL_HYGIENE: INDEPENDENT
DRESS: INDEPENDENT
DRESS: INDEPENDENT
LAUNDRY: WITH SUPERVISION
ORAL_HYGIENE: INDEPENDENT
LAUNDRY: WITH SUPERVISION

## 2017-12-07 NOTE — PLAN OF CARE
"Problem: Behavioral Disturbance  Goal: Behavioral Disturbance  Signs and symptoms of listed problems will be absent or manageable by discharge or transition of care.    Outcome: Adequate for Discharge Date Met: 12/07/17  Patient is calm, cooperative and med-compliant. Patient is eating 100% and \"sleep is fine.\" Patient GOAL is \"discharge to CD treatment ASAP.\" Patient denies SI/SIB. No behaviors. No concerns.      "

## 2017-12-07 NOTE — PLAN OF CARE
"Problem: Behavioral Disturbance  Goal: Behavioral Disturbance  Signs and symptoms of listed problems will be absent or manageable by discharge or transition of care.   Outcome: Improving  48 hour nursing assessment:  Pt evaluation continues.  Assessed mood, anxiety, thoughts and behavior.  Is progressing towards goals.  Encourage participation in groups and developing healthy coping skills. Refer to daily team meeting notes for individualized plan of care.    Pt.was out in the milieu watching TV with peers. He appeared irritable during check-in. Stated \"I have not seen a Dr.since I came here\". Refused to talk to or acknowledge the nurse practitioner who is his psychiatric provider on the unit. Stated \"she is not a Dr. I don't care for her\". Stated he was waiting for his discharge next week. Complained of \"blurry\" sight. Denied SI/SIB/AH/VH. Will continue to monitor.         "

## 2017-12-07 NOTE — PROGRESS NOTES
"[facility], [place]   Psychiatric Progress Note      Impression:     Sidney Varghese is a 52-year-old male admitted to [facility] on 11/22/2017.  He was admitted from [place] ED under MICD commitment with his stay of commitment recently vacated.  He had been hospitalized 8/7/2017 through 8/25/2017.  After completing residential CD treatment, he had been inconsistently compliant with outpatient CD treatment, and when his  called him he reported suicidal ideation.  He says that he consumed alcohol on about 10 occasions since his discharge.  The MSSA was initiated but he did not require treatment with Valium.  Zoloft was discontinued and replaced with Prozac.  Trazodone, Melatonin and Seroquel were changed from PRN to scheduled at bedtime.  Vistaril was scheduled at bedtime.  PRNs of Vistaril and Zyprexa are available.  He has been irritable and has made threats to \"go off\" and harm others.  He denies suicidal ideation.  He has been refusing to meet with his provider.         Diagnoses:     Major depressive disorder, moderate, recurrent  Panic disorder  Alcohol use disorder, severe  Nicotine use disorder  History of possible TBI  Asthma   Questionable diagnosis of lung cancer  Possible sleep apnea         Plan:     Medications:  Continue Prozac to 40mg.  Continue Seroquel, Melatonin, Trazodone, and Vistaril at bedtime.  Continue PRNs of Vistaril and Zyprexa.  Discharge meds ordered.  His girlfriend will also retrieve medications from his parents' home.    He is under MICD commitment.  Discharge to [place] at noon on Monday 12/11.  He will need a remote PD.  He must follow up with his PCP regarding questionable diagnosis of lung cancer, and for a sleep study referral.      Attestation:  Patient has been seen and evaluated by me,  Sofya Spencer, JAMIA CNP  The patient was counseled on  nature of illness and treatment plan/options  Care was coordinated " "with  tx team          Interim History:     The patient's care was discussed with the treatment team and chart notes were reviewed.  Pt was documented as sleeping 7.5 hours during the overnight shift.  Pt has been spending time in the milieu.  Staff report he has been irritable when they approach him to check in.  Pt again refused to meet with me today, though appeared less irritable and contemplated his response before saying, \"What for?  There's nothing to talk about.\"           Medications:     Current Facility-Administered Medications   Medication     QUEtiapine (SEROquel) tablet 400 mg     traZODone (DESYREL) tablet 100 mg     melatonin tablet 6 mg     hydrOXYzine (ATARAX) tablet 50 mg     FLUoxetine (PROzac) capsule 40 mg     cloNIDine (CATAPRES) tablet 0.1 mg     sodium chloride (OCEAN) 0.65 % nasal spray 1 spray     albuterol (PROAIR HFA/PROVENTIL HFA/VENTOLIN HFA) Inhaler 2 puff     hydrOXYzine (ATARAX) tablet 25-50 mg     alum & mag hydroxide-simethicone (MYLANTA ES/MAALOX  ES) suspension 30 mL     magnesium hydroxide (MILK OF MAGNESIA) suspension 30 mL     bisacodyl (DULCOLAX) Suppository 10 mg     OLANZapine (zyPREXA) tablet 10 mg    Or     OLANZapine (zyPREXA) injection 10 mg     nicotine polacrilex (NICORETTE) gum 2-4 mg     ibuprofen (ADVIL/MOTRIN) tablet 400 mg             Allergies:   No Known Allergies         Psychiatric Examination:   BP (!) 137/91 (BP Location: Left arm)  Pulse 76  Temp 97.5  F (36.4  C) (Oral)  Resp 16  Ht 1.778 m (5' 10\")  Wt 86.4 kg (190 lb 6.4 oz)  BMI 27.32 kg/m2  Weight is 190 lbs 6.4 oz  Body mass index is 27.32 kg/(m^2).    Appearance:  awake, alert, somewhat disheveled  Attitude:  guarded  Eye Contact:  minimal  Mood:  Unable to thoroughly assess due to lack of participation in the assessment  Affect: less irritable  Speech:  clear, coherent  Psychomotor Behavior:  no evidence of tardive dyskinesia, dystonia, or tics  Thought Process:  Unable to thoroughly assess " due to lack of participation in the assessment  Associations:  Unable to thoroughly assess due to lack of participation in the assessment  Thought Content:  Unable to thoroughly assess due to lack of participation in the assessment  Insight:  partial  Judgment:  fair  Oriented to: Unable to thoroughly assess due to lack of participation in the assessment  Attention Span and Concentration:  Unable to thoroughly assess due to lack of participation in the assessment  Recent and Remote Memory:  Unable to thoroughly assess due to lack of participation in the assessment  Language: intact  Fund of Knowledge: appropriate  Muscle Strength and Tone: normal  Gait and Station: Normal         Labs:   No results found for this or any previous visit (from the past 24 hour(s)).

## 2017-12-07 NOTE — PROGRESS NOTES
Writer met with Rahul Mclaughlin who came to the unit to complete the PD. Reports that she will send PD to  as well as to county for change of status. Copy of PD paperwork was placed in legal section of chart and copy was given to pt. Rahul Mclaughlin requests update if anything changes.  Pt will be d/c on Monday 12/11 and transportation will pick him up at noon. CM should let Shannon at [facility] [place] know on Monday which unit pt is on, [phone]. Shannon and Ilsa are also requesting we fax current MAR, Provisional Discharge, and H&P to [place] upon d/c. Fax number: [phone].  Pt has [facility] and will need relapse prevention form complete upon d/c from unit.   Pt continues to remain irritable about the process but is aware that it is required and is agreeable.

## 2017-12-07 NOTE — PROGRESS NOTES
"Patient c/o \"eyes blurry 1.5 hours after taking morning prozac. NP notified.     Nursing will continue to monitor.  "

## 2017-12-08 PROCEDURE — 25000132 ZZH RX MED GY IP 250 OP 250 PS 637: Performed by: NURSE PRACTITIONER

## 2017-12-08 PROCEDURE — 99232 SBSQ HOSP IP/OBS MODERATE 35: CPT | Performed by: NURSE PRACTITIONER

## 2017-12-08 PROCEDURE — 12400001 ZZH R&B MH UMMC

## 2017-12-08 RX ADMIN — TRAZODONE HYDROCHLORIDE 100 MG: 100 TABLET ORAL at 20:51

## 2017-12-08 RX ADMIN — MELATONIN TAB 3 MG 6 MG: 3 TAB at 20:51

## 2017-12-08 RX ADMIN — QUETIAPINE FUMARATE 400 MG: 400 TABLET ORAL at 20:51

## 2017-12-08 RX ADMIN — HYDROXYZINE HYDROCHLORIDE 50 MG: 50 TABLET, FILM COATED ORAL at 20:51

## 2017-12-08 RX ADMIN — FLUOXETINE 40 MG: 20 CAPSULE ORAL at 09:35

## 2017-12-08 ASSESSMENT — ACTIVITIES OF DAILY LIVING (ADL)
LAUNDRY: WITH SUPERVISION
GROOMING: INDEPENDENT
ORAL_HYGIENE: INDEPENDENT
DRESS: STREET CLOTHES;INDEPENDENT
ORAL_HYGIENE: INDEPENDENT
GROOMING: HANDWASHING;INDEPENDENT
LAUNDRY: WITH SUPERVISION
GROOMING: INDEPENDENT
ORAL_HYGIENE: INDEPENDENT
DRESS: INDEPENDENT
DRESS: STREET CLOTHES

## 2017-12-08 NOTE — PROGRESS NOTES
"[facility], [place]   Psychiatric Progress Note      Impression:     Sidney Varghese is a 52-year-old male admitted to [facility] on 11/22/2017.  He was admitted from [place] ED under MICD commitment with his stay of commitment recently vacated.  He had been hospitalized 8/7/2017 through 8/25/2017.  After completing residential CD treatment, he had been inconsistently compliant with outpatient CD treatment, and when his  called him he reported suicidal ideation.  He says that he consumed alcohol on about 10 occasions since his discharge.  The MSSA was initiated but he did not require treatment with Valium.  Zoloft was discontinued and replaced with Prozac.  Trazodone, Melatonin and Seroquel were changed from PRN to scheduled at bedtime.  Vistaril was scheduled at bedtime.  PRNs of Vistaril and Zyprexa are available.  He has been irritable and has made threats to \"go off\" and harm others.  He denies suicidal ideation.  He has been refusing to meet with his provider.         Diagnoses:     Major depressive disorder, moderate, recurrent  Panic disorder  Alcohol use disorder, severe  Nicotine use disorder  History of possible TBI  Asthma   Questionable diagnosis of lung cancer  Possible sleep apnea         Plan:     Medications:  Continue Prozac to 40mg.  Continue Seroquel, Melatonin, Trazodone, and Vistaril at bedtime.  Continue PRNs of Vistaril and Zyprexa.  Discharge meds ordered.  His girlfriend will also retrieve medications from his parents' home.    He is under MICD commitment.  Discharge to [facility] at noon on Monday 12/11.  He will need a remote PD.  He must follow up with his PCP regarding questionable diagnosis of lung cancer, and for a sleep study referral.      Attestation:  Patient has been seen and evaluated by me,  Sofya Spencer, JAMIA CNP  The patient was counseled on  nature of illness and treatment plan/options  Care was coordinated " "with  tx team          Interim History:     The patient's care was discussed with the treatment team and chart notes were reviewed.  Pt was documented as sleeping 7.5 hours during the overnight shift.  Pt has been spending time in the milieu.  He remains guarded and irritable when approached by staff.  Pt told his RN yesterday that he has blurry vision.  Today I approached him while he was lying in bed and asked if he wanted to talk about his concerns regarding blurry vision.  He replied, \"Leave.  It'll go away.\"           Medications:     Current Facility-Administered Medications   Medication     QUEtiapine (SEROquel) tablet 400 mg     traZODone (DESYREL) tablet 100 mg     melatonin tablet 6 mg     hydrOXYzine (ATARAX) tablet 50 mg     FLUoxetine (PROzac) capsule 40 mg     cloNIDine (CATAPRES) tablet 0.1 mg     sodium chloride (OCEAN) 0.65 % nasal spray 1 spray     albuterol (PROAIR HFA/PROVENTIL HFA/VENTOLIN HFA) Inhaler 2 puff     hydrOXYzine (ATARAX) tablet 25-50 mg     alum & mag hydroxide-simethicone (MYLANTA ES/MAALOX  ES) suspension 30 mL     magnesium hydroxide (MILK OF MAGNESIA) suspension 30 mL     bisacodyl (DULCOLAX) Suppository 10 mg     OLANZapine (zyPREXA) tablet 10 mg    Or     OLANZapine (zyPREXA) injection 10 mg     nicotine polacrilex (NICORETTE) gum 2-4 mg     ibuprofen (ADVIL/MOTRIN) tablet 400 mg             Allergies:   No Known Allergies         Psychiatric Examination:   BP (!) 137/91 (BP Location: Left arm)  Pulse 76  Temp 97.5  F (36.4  C) (Oral)  Resp 16  Ht 1.778 m (5' 10\")  Wt 86.4 kg (190 lb 6.4 oz)  BMI 27.32 kg/m2  Weight is 190 lbs 6.4 oz  Body mass index is 27.32 kg/(m^2).    Appearance:  awake, alert, somewhat disheveled  Attitude:  guarded  Eye Contact:  minimal  Mood:  Unable to thoroughly assess due to lack of participation in the assessment  Affect: irritable  Speech:  clear, coherent  Psychomotor Behavior:  no evidence of tardive dyskinesia, dystonia, or tics  Thought " Process:  Unable to thoroughly assess due to lack of participation in the assessment  Associations:  Unable to thoroughly assess due to lack of participation in the assessment  Thought Content:  Unable to thoroughly assess due to lack of participation in the assessment  Insight:  partial  Judgment:  fair  Oriented to: Unable to thoroughly assess due to lack of participation in the assessment  Attention Span and Concentration:  Unable to thoroughly assess due to lack of participation in the assessment  Recent and Remote Memory:  Unable to thoroughly assess due to lack of participation in the assessment  Language: intact  Fund of Knowledge: appropriate  Muscle Strength and Tone: normal  Gait and Station: lying in bed         Labs:   No results found for this or any previous visit (from the past 24 hour(s)).

## 2017-12-08 NOTE — PROGRESS NOTES
Pt no SI, SIB stated/observed. Visible/social in milieu, vulgar with other patients. Irritable at times, did not want to check in. No significant changes.     12/07/17 8940   Behavioral Health   Hallucinations denies / not responding to hallucinations   Thinking poor concentration   Orientation person: oriented;place: oriented   Memory baseline memory   Insight poor   Judgement impaired   Eye Contact at examiner   Affect full range affect   Mood mood is calm;irritable   Physical Appearance/Attire attire appropriate to age and situation   Hygiene well groomed   Suicidality other (see comments)  (none stated/observed)   1. Wish to be Dead No   2. Non-Specific Active Suicidal Thoughts  No   3. Active Sucidal Ideation with any Methods (Not Plan) Without Intent to Act  No   4. Active Suicidal Ideation with Some Intent to Act, Without Specific Plan  No   5. Active Suicidal Ideation with Specific Plan and Intent  No   Self Injury other (see comment)  (none stated/observed)   Elopement (N/A)   Activity other (see comment)  (visible/social in milieu)   Speech clear;coherent   Medication Sensitivity no stated side effects;no observed side effects   Psychomotor / Gait balanced;steady   Psycho Education   Type of Intervention 1:1 intervention   Response observes from a distance   Activities of Daily Living   Hygiene/Grooming independent   Oral Hygiene independent   Dress independent   Laundry with supervision   Room Organization independent   Behavioral Health Interventions   Behavioral Disturbance maintain safety precautions;monitor need to revise level of observation;maintain safe secure environment;reality orientation;simple, clear language;decrease environmental stimulation;assist in development of alternative methods of expressive communication;redirection of intrusive behaviors;encourage clear communication of needs;redirection of aggressive behaviors;encourage nutrition and hydration;encourage participation /  independence with adls;provide emotional support;establish therapeutic relationship;provide positive feedback for use of effective coping skills;build upon strengths;monitor need for prn medication;monitor confusion, memory loss, decision making ability and reorient / intervent as needed   Social and Therapeutic Interventions (Behavioral Disturbance) encourage socialization with peers;encourage effective boundaries with peers;encourage participation in therapeutic groups and milieu activities

## 2017-12-09 PROCEDURE — 25000132 ZZH RX MED GY IP 250 OP 250 PS 637: Performed by: NURSE PRACTITIONER

## 2017-12-09 PROCEDURE — 12400007 ZZH R&B MH INTERMEDIATE UMMC

## 2017-12-09 RX ADMIN — QUETIAPINE FUMARATE 400 MG: 400 TABLET ORAL at 21:17

## 2017-12-09 RX ADMIN — TRAZODONE HYDROCHLORIDE 100 MG: 100 TABLET ORAL at 21:18

## 2017-12-09 RX ADMIN — FLUOXETINE 40 MG: 20 CAPSULE ORAL at 09:21

## 2017-12-09 RX ADMIN — HYDROXYZINE HYDROCHLORIDE 50 MG: 50 TABLET, FILM COATED ORAL at 21:18

## 2017-12-09 RX ADMIN — MELATONIN TAB 3 MG 6 MG: 3 TAB at 21:18

## 2017-12-09 ASSESSMENT — ACTIVITIES OF DAILY LIVING (ADL)
ORAL_HYGIENE: INDEPENDENT
ORAL_HYGIENE: INDEPENDENT
GROOMING: INDEPENDENT
LAUNDRY: WITH SUPERVISION
GROOMING: INDEPENDENT
DRESS: STREET CLOTHES
DRESS: INDEPENDENT

## 2017-12-09 NOTE — PROGRESS NOTES
12/09/17 1400   Behavioral Health   Hallucinations denies / not responding to hallucinations   Thinking intact   Orientation time: oriented;date: oriented;place: oriented;person: oriented   Memory baseline memory   Insight admits / accepts   Judgement impaired   Eye Contact at examiner   Affect full range affect   Mood mood is calm   Physical Appearance/Attire appears stated age   Hygiene well groomed   Suicidality other (see comments)  (Denies)   1. Wish to be Dead No   Wish to be Dead Description None stated.    2. Non-Specific Active Suicidal Thoughts  No   Non-Specific Active Suicidal Thought Description None stated.    Self Injury other (see comment)  (Denies)   Activity other (see comment)  (Social with peers)   Speech clear;coherent   Medication Sensitivity no stated side effects   Psychomotor / Gait balanced;steady   Psycho Education   Type of Intervention 1:1 intervention   Response participates, initiates socially appropriate   Hours 0.5   Activities of Daily Living   Hygiene/Grooming independent   Oral Hygiene independent   Dress street clothes   Laundry with supervision   Room Organization independent   Activity   Activity Assistance Provided independent   Behavioral Health Interventions   Behavioral Disturbance maintain safety precautions;assist patient in developing safety plan;assist in development of alternative methods of expressive communication;provide emotional support;establish therapeutic relationship   Social and Therapeutic Interventions (Behavioral Disturbance) encourage socialization with peers;encourage participation in therapeutic groups and milieu activities   Pt spent time in the lounge and he did shower this shift. He reported doing well and waiting for placement. He appears cooperative and pleasant.

## 2017-12-09 NOTE — PROGRESS NOTES
Pt transfers to S[address]20 from [address]32 at approximately 2020. Pt stable, alert and oriented X4, denies SI/SIB/HI, denies AH and VH. Pt calm and cooperative. Pt seen to act appropriately and respect boundaries with peers and staff.

## 2017-12-09 NOTE — PROGRESS NOTES
Patient transferred via wheelchair with 2 staff and Security to Station 20. . Ticket to ride completed.  Patient's belongings and medications sent with patient. Patient did bring soft care mattress with to Station [facility].  Patient calm and cooperative.

## 2017-12-10 PROCEDURE — 25000132 ZZH RX MED GY IP 250 OP 250 PS 637: Performed by: NURSE PRACTITIONER

## 2017-12-10 PROCEDURE — 12400007 ZZH R&B MH INTERMEDIATE UMMC

## 2017-12-10 RX ADMIN — MELATONIN TAB 3 MG 6 MG: 3 TAB at 21:23

## 2017-12-10 RX ADMIN — QUETIAPINE FUMARATE 400 MG: 400 TABLET ORAL at 21:22

## 2017-12-10 RX ADMIN — HYDROXYZINE HYDROCHLORIDE 50 MG: 50 TABLET, FILM COATED ORAL at 21:22

## 2017-12-10 RX ADMIN — FLUOXETINE 40 MG: 20 CAPSULE ORAL at 09:28

## 2017-12-10 RX ADMIN — TRAZODONE HYDROCHLORIDE 100 MG: 100 TABLET ORAL at 21:23

## 2017-12-10 ASSESSMENT — ACTIVITIES OF DAILY LIVING (ADL)
LAUNDRY: WITH SUPERVISION
DRESS: STREET CLOTHES
ORAL_HYGIENE: INDEPENDENT
HYGIENE/GROOMING: INDEPENDENT

## 2017-12-10 NOTE — PLAN OF CARE
"Problem: General Plan of Care (Inpatient Behavioral)  Goal: Discharge Planning  Outcome: Adequate for Discharge Date Met: 12/10/17  Pt in [facility] most of day watching TV and football game. Mood is calm, social with peers and staff. Pt meds here ready for discharge tomorrow to [facility] [place]  Pt denies SI . No behavioral problems.      "

## 2017-12-10 NOTE — PLAN OF CARE
"Problem: General Plan of Care (Inpatient Behavioral)  Goal: Individualization/Patient Specific Goal (IP Behavioral)  The patient and/or their representative will achieve their patient-specific goals related to the plan of care.    The patient-specific goals include:     Illness Management Recovery model: Objectives  Patient will identify reason(s) for hospitalization from their perspective.  Patient will identify a minimum of three goals for discharge.  Patient will identify a minimum of three triggers that may increase their symptoms.  Patient will identify a minimum of three coping skills they can do to stay well.   Patient will identify their support system to demonstrate readiness for discharge.    Illness Management & Recovery assists patient to develop relapse prevention as  patient identifies triggers for relapse.  patient identifies a general wellness strategy.  patient identifies the warning signs that they are in danger of relapse.  patient identifies someone they count on to get feedback .  patient identifies ways to take action when in danger of relapse.  patient identifies way to cope with stress or other symptoms.   patient participates in self-reflection.       Outcome: Adequate for Discharge Date Met: 12/09/17  Pt presents as calm and cooperative. Pt visible in the milieu for majority of shift. Pt seen to act appropriately and respect boundaries with staff and peers. Pt explains that his appetite has been \"alright.\" Pt states his sleep has been \"so-so.\" Pt explains that he is not having any issue with his appetite or sleep hygiene.   Pt explains that he experiences blurry vision after taking his AM Prozac. Pt states \"it's like I've been drinking\" in reference to his eyesight after taking this medication. No other adverse effects stated by Pt or seen by staff.   Pt reports having flank pain for the \"last several months.\" No other acute physical ailments stated by Pt or seen by staff.   Pt denies " SI/SIB/HI. Pt denies AH and VH. Pt rates his anxiety a 0/10 and his depression a 2/10.

## 2017-12-11 ENCOUNTER — APPOINTMENT (OUTPATIENT)
Dept: BEHAVIORAL HEALTH | Facility: CLINIC | Age: 52
End: 2017-12-11
Attending: PSYCHIATRY & NEUROLOGY
Payer: COMMERCIAL

## 2017-12-11 VITALS
WEIGHT: 190 LBS | HEART RATE: 76 BPM | TEMPERATURE: 97.8 F | RESPIRATION RATE: 16 BRPM | HEIGHT: 70 IN | SYSTOLIC BLOOD PRESSURE: 137 MMHG | BODY MASS INDEX: 27.2 KG/M2 | DIASTOLIC BLOOD PRESSURE: 91 MMHG

## 2017-12-11 PROCEDURE — 99212 OFFICE O/P EST SF 10 MIN: CPT

## 2017-12-11 PROCEDURE — 25000132 ZZH RX MED GY IP 250 OP 250 PS 637: Performed by: NURSE PRACTITIONER

## 2017-12-11 PROCEDURE — 99239 HOSP IP/OBS DSCHRG MGMT >30: CPT | Performed by: NURSE PRACTITIONER

## 2017-12-11 RX ADMIN — FLUOXETINE 40 MG: 20 CAPSULE ORAL at 08:57

## 2017-12-11 ASSESSMENT — ACTIVITIES OF DAILY LIVING (ADL)
DRESS: STREET CLOTHES;INDEPENDENT
ORAL_HYGIENE: INDEPENDENT
GROOMING: INDEPENDENT

## 2017-12-11 NOTE — PROGRESS NOTES
Patient reports some continued depression but denies SI/SIB. Patient was visible and social with full range affect and states he feels ready to discharge to treatment.     12/10/17 2200   Behavioral Health   Hallucinations denies / not responding to hallucinations   Thinking intact   Orientation person: oriented;place: oriented;date: oriented;time: oriented   Memory baseline memory   Insight insight appropriate to situation   Judgement intact   Eye Contact at examiner   Affect full range affect   Mood mood is calm   Physical Appearance/Attire appears stated age;attire appropriate to age and situation   Hygiene well groomed   Suicidality other (see comments)  (Denies)   1. Wish to be Dead No   2. Non-Specific Active Suicidal Thoughts  No   Self Injury other (see comment)  (Denies)   Activity other (see comment)  (Visible and social.)   Speech clear;coherent   Medication Sensitivity no stated side effects;no observed side effects   Psychomotor / Gait balanced;steady   Activities of Daily Living   Hygiene/Grooming independent   Oral Hygiene independent   Dress street clothes   Laundry with supervision   Room Organization independent

## 2017-12-11 NOTE — PROGRESS NOTES
Met with pt to review relapse prevention plan and to sign Provisional discharge agreement.  Pt will be going to treatment at [facility].

## 2017-12-11 NOTE — PLAN OF CARE
Problem: Patient Care Overview  Goal: Discharge Needs Assessment  Outcome: Adequate for Discharge Date Met: 12/11/17  Gaurav is discharged with a 30 day supply of medication to [place] [facility].  He denies being suicidal.  He is being discharged on a provisional discharge.  He was cooperative with all discharge procedures and signed his discharge instruction sheet and his valuables form.

## 2017-12-11 NOTE — DISCHARGE SUMMARY
Psychiatric Discharge Summary    Sidney Varghese MRN# [id number]   Age: 52 year old YOB: 1965     Date of Admission:  11/22/2017  Date of Discharge:  12/11/2017  Admitting Physician:  Karson Hartman MD  Discharge Physician:  JAMIA Spears CNP (Contact: [phone]         Event Leading to Hospitalization:      Sidney Varghese is a 52-year-old male admitted to [address] on 11/22/2017.  He was admitted from [facility] under MICD commitment with his stay of commitment recently vacated.  He had been hospitalized 8/7/2017 through 8/25/2017.  After completing residential CD treatment, he had been inconsistently compliant with outpatient CD treatment, and when his  called him he reported suicidal ideation.  He says that he consumed alcohol on about 10 occasions since his discharge.      From H&P 11/23/2017:    Sidney Varghese with a past medical history of alcohol use, scoliosis, depression, possible lung cancer, hypertension was admitted 11/22/2017 for a revoked provisional discharge.     Sidney according to reports was picked up by police after not following through with his substance treatment program and and received a revoke provisional discharge from his .     He describes a confusing situation where he had completed 2 treatment programs and was going to attend a day treatment program once per week and his  wanted him there 3 times per week but he could not go because of transportation issues. He was then potentially revoked and brought into the hospital. He does take medications including trazodone, melatonin, quetiapine and describes strange dreams. Apparently he probably has sleep apnea described as not breathing at times when he is sleeping and excessively loud snoring. He denies any thoughts of harming himself or others and has hopelessness and helplessness that comes and goes and continued sleep difficulties even with  medicine. He denies any attention or concentration issues or anhedonia or any psychotic symptoms or paranoia. No eating disorder symptoms excessive compulsive disorder symptoms gambling addiction pornography addiction or sexual addiction. No manic episodes previously and panic episodes at times.     We discussed the likelihood of him having obstructive sleep apnea and him needing sleep study evaluation and that no medication would potentially help him unless we figured out his sleep dysfunction.     Physically he has back pain and also recently broke his toes upon awakening and had his feet and a mousetrap his mother placed on the floor. He also has a head cold. He did not follow-up with his outpatient plan for the abnormal findings on his lungs.    See full admission note by Dr. Parr 11/23/2017 for details.           Diagnoses:     Major depressive disorder, moderate, recurrent  Panic disorder  Alcohol use disorder, severe  Nicotine use disorder  History of possible TBI  Asthma   Questionable diagnosis of lung cancer  Possible sleep apnea         Labs:      Ref. Range 11/27/2017 13:00   Color Urine Unknown Yellow   Appearance Urine Unknown Clear   Glucose Urine Latest Ref Range: NEG^Negative mg/dL Negative   Bilirubin Urine Latest Ref Range: NEG^Negative  Negative   Ketones Urine Latest Ref Range: NEG^Negative mg/dL Negative   Specific Gravity Urine Latest Ref Range: 1.003 - 1.035  1.010   pH Urine Latest Ref Range: 5.0 - 7.0 pH 7.0   Protein Albumin Urine Latest Ref Range: NEG^Negative mg/dL Negative   Urobilinogen mg/dL Latest Ref Range: 0.0 - 2.0 mg/dL Normal   Nitrite Urine Latest Ref Range: NEG^Negative  Negative   Blood Urine Latest Ref Range: NEG^Negative  Negative   Leukocyte Esterase Urine Latest Ref Range: NEG^Negative  Negative   Source Unknown Unspecified Urine   WBC Urine Latest Ref Range: 0 - 2 /HPF 0   RBC Urine Latest Ref Range: 0 - 2 /HPF <1   Amphetamine Qual Urine Latest Ref Range:  "NEG^Negative  Negative   Cocaine Qual Urine Latest Ref Range: NEG^Negative  Negative   Opiates Qualitative Urine Latest Ref Range: NEG^Negative  Negative   Cannabinoids Qual Urine Latest Ref Range: NEG^Negative  Negative   Barbiturates Qual Urine Latest Ref Range: NEG^Negative  Negative   Pcp Qual Urine Latest Ref Range: NEG^Negative  Negative   Benzodiazepine Qual Urine Latest Ref Range: NEG^Negative  Negative   Ethanol Qual Urine Latest Ref Range: NEG^Negative  Negative            Consults:     No consultations were requested during this admission.         Hospital Course:     Sidney Varghese was admitted to [facility] with attending Karson Hartman MD, under the direct care of Beth Spencer NP under MICD commitment with his stay of commitment recently vacated. The patient was placed under status 15 (15 minute checks) to ensure patient safety.     MSSA protocol was initiated due to the patient's history of alcohol abuse and concern for withdrawal symptoms.  He did not require treatment with Valium and the MSSA was discontinued.      Zoloft was discontinued and replaced with Prozac.  Trazodone, Melatonin and Seroquel were changed from PRN to scheduled at bedtime.  Vistaril was scheduled at bedtime.      Sidney Varghese did not participate in groups.  He was visible in the milieu, social with other patients.  He was upset about being hospitalized under commitment.  He made derogatory comments regarding his treatment team.  Near the end of his hospitalization he refused to participate in discussions with his provider.  Near the end of his hospitalization he also made threats to \"go off\" and harm others, but he did not follow through.  Behavior did not warrant restraint/seclusion.      An updated Rule 25 was completed and he was referred to [place].      The patient's symptoms of depression improved slightly.  He denied suicidal ideation.  He reported he slept poorly but staff documented that he slept well.  " "He remained irritable.      Sidney Varghese was released to [facility] treatment under provisional discharge.  At the time of discharge Sidney Varghese was determined to not be a danger to himself or others.          Discharge Medications:      Sidney Varghese   Home Medication Instructions SUNSHINE:[id number]    Printed on:12/11/17 0514   Medication Information                      albuterol (PROAIR HFA/PROVENTIL HFA/VENTOLIN HFA) 108 (90 BASE) MCG/ACT Inhaler  Inhale 2 puffs into the lungs 4 times daily as needed for shortness of breath / dyspnea or wheezing             FLUoxetine (PROZAC) 40 MG capsule  Take 1 capsule (40 mg) by mouth daily             hydrOXYzine (ATARAX) 50 MG tablet  Take 1 tablet (50 mg) by mouth At Bedtime             melatonin 3 MG tablet  Take 2 tablets (6 mg) by mouth At Bedtime             QUEtiapine (SEROQUEL) 400 MG tablet  Take 1 tablet (400 mg) by mouth At Bedtime             traZODone (DESYREL) 100 MG tablet  Take 1 tablet (100 mg) by mouth At Bedtime                      Psychiatric Examination:     BP (!) 137/91 (BP Location: Left arm)  Pulse 76  Temp 98  F (36.7  C) (Oral)  Resp 16  Ht 1.778 m (5' 10\")  Wt 86.2 kg (190 lb)  BMI 27.26 kg/m2    Appearance:  awake, alert, somewhat disheveled  Attitude:  guarded  Eye Contact:  good  Mood:  \"good\"  Affect: normal range  Speech:  clear, coherent  Psychomotor Behavior:  no evidence of tardive dyskinesia, dystonia, or tics  Thought Process:  linear and goal oriented  Associations:  no loose associations  Thought Content:  no evidence of suicidal ideation or homicidal ideation and no evidence of psychotic thought  Insight:  partial  Judgment:  fair  Oriented to: date, time, person, and place  Attention Span and Concentration:  intact  Recent and Remote Memory:  intact  Language: intact  Fund of Knowledge: appropriate  Muscle Strength and Tone: normal  Gait and Station: Normal            Discharge Plan:     Per Discharge " AVS:    Health Care Follow-up Appointments:   You are being discharged to [facility]  [address], [place] [address]    :  Brenda Blackburn from [facility] Phone: [phone]  Fax: [phone]     You are discharging with a 30 day supply of your medications, after you have settled at the treatment center, please make an appointment with your PCP or psychiatrist.   Primary Care Provider is:   [facility]  [address], [place] [address]  Phone: [phone]  Fax: [phone]      He was provided with a 30-day supply of medications plus 1 refill.  He was advised to take his medications as prescribed and to abstain from alcohol and illicit substances.        Attestation:  The patient has been seen and evaluated by me,  JAMIA Spears CNP   Discharge time > 30 minutes

## 2018-09-17 ENCOUNTER — HOSPITAL ENCOUNTER (EMERGENCY)
Facility: CLINIC | Age: 53
Discharge: HOME | End: 2018-09-17
Attending: EMERGENCY MEDICINE | Admitting: EMERGENCY MEDICINE
Payer: COMMERCIAL

## 2018-09-17 VITALS
HEIGHT: 71 IN | OXYGEN SATURATION: 97 % | TEMPERATURE: 97.8 F | SYSTOLIC BLOOD PRESSURE: 184 MMHG | DIASTOLIC BLOOD PRESSURE: 100 MMHG | RESPIRATION RATE: 16 BRPM | WEIGHT: 175 LBS | BODY MASS INDEX: 24.5 KG/M2

## 2018-09-17 DIAGNOSIS — F10.920 ALCOHOLIC INTOXICATION WITHOUT COMPLICATION (H): ICD-10-CM

## 2018-09-17 PROCEDURE — 99282 EMERGENCY DEPT VISIT SF MDM: CPT

## 2018-09-17 ASSESSMENT — ENCOUNTER SYMPTOMS
NAUSEA: 0
SEIZURES: 0
FEVER: 0
ABDOMINAL PAIN: 0
NERVOUS/ANXIOUS: 1
VOMITING: 0

## 2018-09-17 NOTE — DISCHARGE INSTRUCTIONS
"  Alcohol Intoxication  Alcohol intoxication occurs when you drink alcohol faster than your liver can remove it from your system. The following facts are important to remember:    It can take 10 minutes or more to start to feel the effects of a drink, so you can easily get more intoxicated than you intended.    One drink may be more than 1 serving of alcohol. Depending on the drink, it can be 2 to 4 servings.    It takes about an hour for your body to metabolize (clear) 1 serving. If you have more than 1 drink, it can take a couple of hours or more.    Many things affect how drinks will affect you, including whether you ve eaten, how fast you drink, your size, how much you normally drink (or not), medicines you take, chronic diseases you have, and gender.  Signs and symptoms of alcohol poisoning  The following are signs and symptoms of alcohol poisoning:  Mild impairment    Reduced inhibitions    Slurred speech    Drowsiness    Decreased fine motor skills  Moderate impairment    Erratic behavior, aggression, depression    Impaired judgment    Confusion    Concentration difficulties    Coordination problems  Severe impairment    Vomiting    Seizures    Unconsciousness    Cold, clammy    Slow or irregular breathing    Hypothermia (low body temperature)    Coma  Health effects  Alcohol abuse causes health problems. Sometimes this can happen after only drinking a  little.\" There is no set number of drinks or amount of alcohol that defines too much. The more you drink at one time, and the more frequently you drink determine both the short-term and long-term health effects. It affects all parts of your body and your health, including your:    Brain. Alcohol is a central nervous system depressant. It can damage parts of the brain that affect your balance, memory, thinking, and emotions. It can cause memory loss, blackouts, depression, agitation, sleep cycle changes, and seizures. These changes may or may not be " reversible.    Heart and vascular system. Alcohol affects multiple areas. It can damage heart muscle causing cardiomyopathy, which is a weakening and stretching of the heart muscle. This can lead to trouble breathing, an irregular heartbeat, atrial fibrillation, leg swelling, and heart failure. It makes the blood vessels stiffen causing hypertension (high blood pressure). All of these problems increase your risk of having heart attacks or strokes.    Liver. Alcohol causes fat to build up in the liver, affecting its normal function. This increases the risk for hepatitis, leading to abdominal pain, appetite loss, jaundice, bleeding problems, liver fibrosis, and cirrhosis. This in turn can affect your ability to fight off infections, and can cause diabetes. The liver changes prevent it from removing toxins in your blood that can cause encephalopathy. Signs of this are confusion, altered level of consciousness, personality changes, memory loss, seizures, coma, and death.    Pancreas. Alcohol can cause inflammation of the pancreas, or pancreatitis. This can cause pain in your abdomen, fever, and diabetes.    Immune system. Alcohol weakens your immune system in a number of ways. It suppresses your immune system making it harder to fight off infections and colds. You will also have a higher risk of certain infections like pneumonia and tuberculosis.    Cancer risk. Alcohol raises your risk of cancer of the mouth, esophagus, pharynx, larynx, liver, and breast.    Sexual function. Alcohol abuse can also lead to sexual problems.  Alcohol use during pregnancy may cause permanent damage to the growing baby.  Home care  The following guidelines will help you care for yourself at home:    Don't drink any more alcohol.    Don't drive until all effects of the alcohol have worn off.    Don't operate machinery that can cause injuries.    Get lots of rest over the next few days. Drink plenty of water and other non-alcoholic liquids.  Try to eat regular meals.    If you have been drinking heavily on a daily basis, you may go through alcohol withdrawal. The usual symptoms last 3 to 4 days and may include nervousness, shakiness, nausea, sweating, sleeplessness, and can even cause seizures and a serious withdrawal symptom called delirium tremens, or DTs. During this time, it is best that you stay with family or friends who can help and support you. You can also admit yourself to a residential detox program. If your symptoms are severe (seizures, severe shakiness, confusion), contact your doctor or call an ambulance for help (see below).   Follow-up care  If alcohol is a problem in your life, these are some organizations that can help you:    Alcoholics Anonymous offers support through a self-help fellowship. There are no dues or fees. See the Yellow Pages and call for time and place of meetings. Find AA online at www.aa.org.    [facility] offers support to families of alcohol users. Contact 647-598-5421, or online at www.al-anoguilherme.org.    National [place] on Alcoholism and Drug Dependence can be reached at 719-569-0140, or online at www.ncadd.org.    There are also inpatient and residential alcohol detox programs. Check the Internet or phonebook Yellow Pages under  Drug Abuse and Treatment Centers.   Call 911  Call 911 if any of these occur:    Trouble breathing or slow irregular breathing    Chest pain    Sudden weakness on one side of your body or sudden trouble speaking    Heavy bleeding or vomiting blood    Very drowsy or trouble awakening    Fainting or loss of consciousness    Rapid heart rate    Seizure  When to seek medical advice  Call your healthcare provider right away if any of these occur:    Severe shakiness     Fever of 100.4 F (38 C) or higher, or as directed by your healthcare provider    Confusion or hallucinations (seeing, hearing, or feeling things that are not there)    Pain in your upper abdomen that gets worse    Repeated  vomiting  Date Last Reviewed: 6/1/2016 2000-2017 [employer]. [address] [place] [address], [place] [address]. All rights reserved. This information is not intended as a substitute for professional medical care. Always follow your healthcare professional's instructions.
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
## 2018-09-17 NOTE — ED AVS SNAPSHOT
Emergency Department    [address] [facility] [address]    Phone:  [phone]    Fax:  [phone]                                       Sidney Varghese   MRN: [id number]    Department:   Emergency Department   Date of Visit:  9/17/2018           Patient Information     Date Of Birth          1965        Your diagnoses for this visit were:     Alcoholic intoxication without complication (H)        You were seen by Porter Valverde DO.      Follow-up Information     Follow up with [facility], [place] In 2 days.    Specialty:  Family Practice    Why:  and follow up at your intake appt    Contact information:    [address] [place] [address]  [phone]          Follow up with  Emergency Department.    Specialty:  EMERGENCY MEDICINE    Why:  If symptoms worsen    Contact information:    [address] [facility] [id number] [phone]        Discharge Instructions         Alcohol Intoxication  Alcohol intoxication occurs when you drink alcohol faster than your liver can remove it from your system. The following facts are important to remember:    It can take 10 minutes or more to start to feel the effects of a drink, so you can easily get more intoxicated than you intended.    One drink may be more than 1 serving of alcohol. Depending on the drink, it can be 2 to 4 servings.    It takes about an hour for your body to metabolize (clear) 1 serving. If you have more than 1 drink, it can take a couple of hours or more.    Many things affect how drinks will affect you, including whether you ve eaten, how fast you drink, your size, how much you normally drink (or not), medicines you take, chronic diseases you have, and gender.  Signs and symptoms of alcohol poisoning  The following are signs and symptoms of alcohol poisoning:  Mild impairment    Reduced inhibitions    Slurred speech    Drowsiness    Decreased fine motor skills  Moderate  "impairment    Erratic behavior, aggression, depression    Impaired judgment    Confusion    Concentration difficulties    Coordination problems  Severe impairment    Vomiting    Seizures    Unconsciousness    Cold, clammy    Slow or irregular breathing    Hypothermia (low body temperature)    Coma  Health effects  Alcohol abuse causes health problems. Sometimes this can happen after only drinking a  little.\" There is no set number of drinks or amount of alcohol that defines too much. The more you drink at one time, and the more frequently you drink determine both the short-term and long-term health effects. It affects all parts of your body and your health, including your:    Brain. Alcohol is a central nervous system depressant. It can damage parts of the brain that affect your balance, memory, thinking, and emotions. It can cause memory loss, blackouts, depression, agitation, sleep cycle changes, and seizures. These changes may or may not be reversible.    Heart and vascular system. Alcohol affects multiple areas. It can damage heart muscle causing cardiomyopathy, which is a weakening and stretching of the heart muscle. This can lead to trouble breathing, an irregular heartbeat, atrial fibrillation, leg swelling, and heart failure. It makes the blood vessels stiffen causing hypertension (high blood pressure). All of these problems increase your risk of having heart attacks or strokes.    Liver. Alcohol causes fat to build up in the liver, affecting its normal function. This increases the risk for hepatitis, leading to abdominal pain, appetite loss, jaundice, bleeding problems, liver fibrosis, and cirrhosis. This in turn can affect your ability to fight off infections, and can cause diabetes. The liver changes prevent it from removing toxins in your blood that can cause encephalopathy. Signs of this are confusion, altered level of consciousness, personality changes, memory loss, seizures, coma, and " death.    Pancreas. Alcohol can cause inflammation of the pancreas, or pancreatitis. This can cause pain in your abdomen, fever, and diabetes.    Immune system. Alcohol weakens your immune system in a number of ways. It suppresses your immune system making it harder to fight off infections and colds. You will also have a higher risk of certain infections like pneumonia and tuberculosis.    Cancer risk. Alcohol raises your risk of cancer of the mouth, esophagus, pharynx, larynx, liver, and breast.    Sexual function. Alcohol abuse can also lead to sexual problems.  Alcohol use during pregnancy may cause permanent damage to the growing baby.  Home care  The following guidelines will help you care for yourself at home:    Don't drink any more alcohol.    Don't drive until all effects of the alcohol have worn off.    Don't operate machinery that can cause injuries.    Get lots of rest over the next few days. Drink plenty of water and other non-alcoholic liquids. Try to eat regular meals.    If you have been drinking heavily on a daily basis, you may go through alcohol withdrawal. The usual symptoms last 3 to 4 days and may include nervousness, shakiness, nausea, sweating, sleeplessness, and can even cause seizures and a serious withdrawal symptom called delirium tremens, or DTs. During this time, it is best that you stay with family or friends who can help and support you. You can also admit yourself to a residential detox program. If your symptoms are severe (seizures, severe shakiness, confusion), contact your doctor or call an ambulance for help (see below).   Follow-up care  If alcohol is a problem in your life, these are some organizations that can help you:    Alcoholics Anonymous offers support through a self-help fellowship. There are no dues or fees. See the Yellow Pages and call for time and place of meetings. Find AA online at www.aa.org.    [employer] offers support to families of alcohol users. Contact  663.783.3381, or online at www.al-anon.org.    National [place] on Alcoholism and Drug Dependence can be reached at 090-369-1318, or online at www.ncadd.org.    There are also inpatient and residential alcohol detox programs. Check the Internet or phonebook Yellow Pages under  Drug Abuse and Treatment Centers.   Call 911  Call 911 if any of these occur:    Trouble breathing or slow irregular breathing    Chest pain    Sudden weakness on one side of your body or sudden trouble speaking    Heavy bleeding or vomiting blood    Very drowsy or trouble awakening    Fainting or loss of consciousness    Rapid heart rate    Seizure  When to seek medical advice  Call your healthcare provider right away if any of these occur:    Severe shakiness     Fever of 100.4 F (38 C) or higher, or as directed by your healthcare provider    Confusion or hallucinations (seeing, hearing, or feeling things that are not there)    Pain in your upper abdomen that gets worse    Repeated vomiting  Date Last Reviewed: 6/1/2016 2000-2017 [employer]. [address] [place] [address]. All rights reserved. This information is not intended as a substitute for professional medical care. Always follow your healthcare professional's instructions.          24 Hour Appointment Hotline       To make an appointment at any [facility], call [phone] ([phone]). If you don't have a family doctor or clinic, we will help you find one. [facility] clinics are conveniently located to serve the needs of you and your family.             Review of your medicines      Our records show that you are taking the medicines listed below. If these are incorrect, please call your family doctor or clinic.        Dose / Directions Last dose taken    albuterol 108 (90 Base) MCG/ACT inhaler   Commonly known as:  PROAIR HFA/PROVENTIL HFA/VENTOLIN HFA   Dose:  2 puff   Indication:  Asthma        Inhale 2 puffs into the lungs 4 times daily as  needed for shortness of breath / dyspnea or wheezing   Refills:  0        FLUoxetine 40 MG capsule   Commonly known as:  PROzac   Dose:  40 mg   Quantity:  30 capsule        Take 1 capsule (40 mg) by mouth daily   Refills:  1        hydrOXYzine 50 MG tablet   Commonly known as:  ATARAX   Dose:  50 mg   Quantity:  30 tablet        Take 1 tablet (50 mg) by mouth At Bedtime   Refills:  1        melatonin 3 MG tablet   Dose:  6 mg   Quantity:  60 tablet        Take 2 tablets (6 mg) by mouth At Bedtime   Refills:  1        QUEtiapine 400 MG tablet   Commonly known as:  SEROquel   Dose:  400 mg   Quantity:  30 tablet        Take 1 tablet (400 mg) by mouth At Bedtime   Refills:  1        traZODone 100 MG tablet   Commonly known as:  DESYREL   Dose:  100 mg   Quantity:  30 tablet        Take 1 tablet (100 mg) by mouth At Bedtime   Refills:  1                Orders Needing Specimen Collection     None      Pending Results     No orders found from 9/15/2018 to 9/18/2018.            Pending Culture Results     No orders found from 9/15/2018 to 9/18/2018.            Pending Results Instructions     If you had any lab results that were not finalized at the time of your Discharge, you can call the ED Lab Result RN at [phone]. You will be contacted by this team for any positive Lab results or changes in treatment. The nurses are available 7 days a week from 10A to 6:30P.  You can leave a message 24 hours per day and they will return your call.        Test Results From Your Hospital Stay               Clinical Quality Measure: Blood Pressure Screening     Your blood pressure was checked while you were in the emergency department today. The last reading we obtained was  BP: (!) 184/100 . Please read the guidelines below about what these numbers mean and what you should do about them.  If your systolic blood pressure (the top number) is less than 120 and your diastolic blood pressure (the bottom number) is less than 80, then  "your blood pressure is normal. There is nothing more that you need to do about it.  If your systolic blood pressure (the top number) is 120-139 or your diastolic blood pressure (the bottom number) is 80-89, your blood pressure may be higher than it should be. You should have your blood pressure rechecked within a year by a primary care provider.  If your systolic blood pressure (the top number) is 140 or greater or your diastolic blood pressure (the bottom number) is 90 or greater, you may have high blood pressure. High blood pressure is treatable, but if left untreated over time it can put you at risk for heart attack, stroke, or kidney failure. You should have your blood pressure rechecked by a primary care provider within the next 4 weeks.  If your provider in the emergency department today gave you specific instructions to follow-up with your doctor or provider even sooner than that, you should follow that instruction and not wait for up to 4 weeks for your follow-up visit.        Thank you for choosing [facility]       Thank you for choosing [facility] for your care. Our goal is always to provide you with excellent care. Hearing back from our patients is one way we can continue to improve our services. Please take a few minutes to complete the written survey that you may receive in the mail after you visit with us. Thank you!        [employer] Test Information     [employer] ([employer]) lets you send messages to your doctor, view your test results, renew your prescriptions, schedule appointments and more. To sign up, go to [email or web address] . Click on \"Log in\" on the left side of the screen, which will take you to the Welcome page. Then click on \"Sign up Now\" on the right side of the page.     You will be asked to enter the access code listed below, as well as some personal information. Please follow the directions to create your username and password.     Your access code is: [email or web address]  Expires: 12/16/2018  5:13 PM     Your " access code will  in 90 days. If you need help or a new code, please call your [facility] clinic or [phone].        Care EveryWhere ID     This is your Care EveryWhere ID. This could be used by other organizations to access your [facility] medical records  [id number]        Equal Access to Services     NAYA FLETCHER : Hadii aad ku hadasho Sonazaninali, waaxda luqadaha, qaybta kaalmada alirio, florencio montes. So [facility] [phone].    ATENCIÓN: Si habla español, tiene a [place] disposición servicios gratuitos de asistencia lingüística. Llame al [phone].    We comply with applicable federal civil rights laws and [place] laws. We do not discriminate on the basis of race, color, national origin, age, disability, sex, sexual orientation, or gender identity.            After Visit Summary       This is your record. Keep this with you and show to your community pharmacist(s) and doctor(s) at your next visit.

## 2018-09-17 NOTE — ED AVS SNAPSHOT
Emergency Department    [address] [place] [address]    Phone:  [phone]    Fax:  [phone]                                       Sidney Varghese   MRN: [id number]    Department:   Emergency Department   Date of Visit:  9/17/2018           After Visit Summary Signature Page     I have received my discharge instructions, and my questions have been answered. I have discussed any challenges I see with this plan with the nurse or doctor.    ..........................................................................................................................................  Patient/Patient Representative Signature      ..........................................................................................................................................  Patient Representative Print Name and Relationship to Patient    ..................................................               ................................................  Date                                   Time    ..........................................................................................................................................  Reviewed by Signature/Title    ...................................................              ..............................................  Date                                               Time          22EPIC Rev 08/18

## 2018-09-17 NOTE — ED NOTES
"Pt presents with his girlfriend for feelings of depression, anxiety and being \"over it.\" Pt reports for the past 3 months he has had increased stress, depression and has not been sleeping. Pt states he is exhausted and has not been taking his prescribed psych meds since December or January because they make him have terrible, vivid dreams. Pt reports he drinks daily to try and control his anxiety and PTSD. Pt reports hx of commitment which he believes did not help him. He denies SI/HI. Pt's girlfriend here with him whom seems supportive and helpful. Pt reports \"I have just had enough and I want to feel better.\" Pt seems mad and upset, looks at floor while talking but is cooperative at this time.  "

## 2018-11-01 ENCOUNTER — HOSPITAL ENCOUNTER (EMERGENCY)
Facility: CLINIC | Age: 53
Discharge: HOME | End: 2018-11-01
Attending: EMERGENCY MEDICINE | Admitting: EMERGENCY MEDICINE
Payer: COMMERCIAL

## 2018-11-01 ENCOUNTER — NURSE TRIAGE (OUTPATIENT)
Dept: NURSING | Facility: CLINIC | Age: 53
End: 2018-11-01

## 2018-11-01 VITALS
OXYGEN SATURATION: 98 % | WEIGHT: 165 LBS | HEIGHT: 71 IN | RESPIRATION RATE: 16 BRPM | TEMPERATURE: 98.1 F | DIASTOLIC BLOOD PRESSURE: 100 MMHG | HEART RATE: 125 BPM | BODY MASS INDEX: 23.1 KG/M2 | SYSTOLIC BLOOD PRESSURE: 139 MMHG

## 2018-11-01 DIAGNOSIS — F10.920 ALCOHOLIC INTOXICATION WITHOUT COMPLICATION (H): ICD-10-CM

## 2018-11-01 PROCEDURE — 99285 EMERGENCY DEPT VISIT HI MDM: CPT

## 2018-11-01 ASSESSMENT — ENCOUNTER SYMPTOMS: DYSPHORIC MOOD: 1

## 2018-11-01 NOTE — DISCHARGE INSTRUCTIONS
"Pt may contact the following detox facilities to inquire about a bed/help:      [place]: [phone] (typically best to call right away in the morning)  [place]: [phone]  [place]: Substance abuse assessments are provided on a daily basis. Walk-ins are accommodated, if possible.  Monday to Friday, 8 a.m. to 7 p.m.  To schedule an assessment, call [phone].     Pt may contact your county of residence for a substance abuse assessment: [phone]  Alcohol Intoxication  Alcohol intoxication occurs when you drink alcohol faster than your liver can remove it from your system. The following facts are important to remember:    It can take 10 minutes or more to start to feel the effects of a drink, so you can easily get more intoxicated than you intended.    One drink may be more than 1 serving of alcohol. Depending on the drink, it can be 2 to 4 servings.    It takes about an hour for your body to metabolize (clear) 1 serving. If you have more than 1 drink, it can take a couple of hours or more.    Many things affect how drinks will affect you, including whether you ve eaten, how fast you drink, your size, how much you normally drink (or not), medicines you take, chronic diseases you have, and gender.  Signs and symptoms of alcohol poisoning  The following are signs and symptoms of alcohol poisoning:  Mild impairment    Reduced inhibitions    Slurred speech    Drowsiness    Decreased fine motor skills  Moderate impairment    Erratic behavior, aggression, depression    Impaired judgment    Confusion    Concentration difficulties    Coordination problems  Severe impairment    Vomiting    Seizures    Unconsciousness    Cold, clammy    Slow or irregular breathing    Hypothermia (low body temperature)    Coma  Health effects  Alcohol abuse causes health problems. Sometimes this can happen after only drinking a  little.\" There is no set number of drinks or amount of alcohol that defines too much. The more you " drink at one time, and the more frequently you drink determine both the short-term and long-term health effects. It affects all parts of your body and your health, including your:    Brain. Alcohol is a central nervous system depressant. It can damage parts of the brain that affect your balance, memory, thinking, and emotions. It can cause memory loss, blackouts, depression, agitation, sleep cycle changes, and seizures. These changes may or may not be reversible.    Heart and vascular system. Alcohol affects multiple areas. It can damage heart muscle causing cardiomyopathy, which is a weakening and stretching of the heart muscle. This can lead to trouble breathing, an irregular heartbeat, atrial fibrillation, leg swelling, and heart failure. It makes the blood vessels stiffen causing hypertension (high blood pressure). All of these problems increase your risk of having heart attacks or strokes.    Liver. Alcohol causes fat to build up in the liver, affecting its normal function. This increases the risk for hepatitis, leading to abdominal pain, appetite loss, jaundice, bleeding problems, liver fibrosis, and cirrhosis. This in turn can affect your ability to fight off infections, and can cause diabetes. The liver changes prevent it from removing toxins in your blood that can cause encephalopathy. Signs of this are confusion, altered level of consciousness, personality changes, memory loss, seizures, coma, and death.    Pancreas. Alcohol can cause inflammation of the pancreas, or pancreatitis. This can cause pain in your abdomen, fever, and diabetes.    Immune system. Alcohol weakens your immune system in a number of ways. It suppresses your immune system making it harder to fight off infections and colds. You will also have a higher risk of certain infections like pneumonia and tuberculosis.    Cancer risk. Alcohol raises your risk of cancer of the mouth, esophagus, pharynx, larynx, liver, and breast.    Sexual  function. Alcohol abuse can also lead to sexual problems.  Alcohol use during pregnancy may cause permanent damage to the growing baby.  Home care  The following guidelines will help you care for yourself at home:    Don't drink any more alcohol.    Don't drive until all effects of the alcohol have worn off.    Don't operate machinery that can cause injuries.    Get lots of rest over the next few days. Drink plenty of water and other non-alcoholic liquids. Try to eat regular meals.    If you have been drinking heavily on a daily basis, you may go through alcohol withdrawal. The usual symptoms last 3 to 4 days and may include nervousness, shakiness, nausea, sweating, sleeplessness, and can even cause seizures and a serious withdrawal symptom called delirium tremens, or DTs. During this time, it is best that you stay with family or friends who can help and support you. You can also admit yourself to a residential detox program. If your symptoms are severe (seizures, severe shakiness, confusion), contact your doctor or call an ambulance for help (see below).   Follow-up care  If alcohol is a problem in your life, these are some organizations that can help you:    Alcoholics Anonymous offers support through a self-help fellowship. There are no dues or fees. See the Yellow Pages and call for time and place of meetings. Find AA online at www.aa.org.    [employer] offers support to families of alcohol users. Contact 670-790-6146, or online at www.al-noah.org.    National [place] on Alcoholism and Drug Dependence can be reached at 084-928-4668, or online at www.ncadd.org.    There are also inpatient and residential alcohol detox programs. Check the Internet or phonebook Yellow Pages under  Drug Abuse and Treatment Centers.   Call 911  Call 911 if any of these occur:    Trouble breathing or slow irregular breathing    Chest pain    Sudden weakness on one side of your body or sudden trouble speaking    Heavy bleeding or vomiting  blood    Very drowsy or trouble awakening    Fainting or loss of consciousness    Rapid heart rate    Seizure  When to seek medical advice  Call your healthcare provider right away if any of these occur:    Severe shakiness     Fever of 100.4 F (38 C) or higher, or as directed by your healthcare provider    Confusion or hallucinations (seeing, hearing, or feeling things that are not there)    Pain in your upper abdomen that gets worse    Repeated vomiting  Date Last Reviewed: 6/1/2016 2000-2017 [employer]. [address] [place] [address]. All rights reserved. This information is not intended as a substitute for professional medical care. Always follow your healthcare professional's instructions.

## 2018-11-01 NOTE — ED AVS SNAPSHOT
Emergency Department    [address] [facility] [address]    Phone:  [phone]    Fax:  [phone]                                       Sidney Varghese   MRN: [id number]    Department:   Emergency Department   Date of Visit:  11/1/2018           Patient Information     Date Of Birth          1965        Your diagnoses for this visit were:     Alcoholic intoxication without complication (H)        You were seen by Sofya Yañez MD.      Follow-up Information     Follow up with  Emergency Department.    Specialty:  EMERGENCY MEDICINE    Why:  As needed    Contact information:    [address] [facility] [address] [phone]        Discharge Instructions       Pt may contact the following detox facilities to inquire about a bed/help:      [facility]: [phone] (typically best to call right away in the morning)  [facility]: [phone]  [facility]: Substance abuse assessments are provided on a daily basis. Walk-ins are accommodated, if possible.  Monday to Friday, 8 a.m. to 7 p.m.  To schedule an assessment, call [phone].     Pt may contact your county of residence for a substance abuse assessment: [phone]  Alcohol Intoxication  Alcohol intoxication occurs when you drink alcohol faster than your liver can remove it from your system. The following facts are important to remember:    It can take 10 minutes or more to start to feel the effects of a drink, so you can easily get more intoxicated than you intended.    One drink may be more than 1 serving of alcohol. Depending on the drink, it can be 2 to 4 servings.    It takes about an hour for your body to metabolize (clear) 1 serving. If you have more than 1 drink, it can take a couple of hours or more.    Many things affect how drinks will affect you, including whether you ve eaten, how fast you drink, your size, how much you normally drink (or not), medicines you take, chronic diseases you  "have, and gender.  Signs and symptoms of alcohol poisoning  The following are signs and symptoms of alcohol poisoning:  Mild impairment    Reduced inhibitions    Slurred speech    Drowsiness    Decreased fine motor skills  Moderate impairment    Erratic behavior, aggression, depression    Impaired judgment    Confusion    Concentration difficulties    Coordination problems  Severe impairment    Vomiting    Seizures    Unconsciousness    Cold, clammy    Slow or irregular breathing    Hypothermia (low body temperature)    Coma  Health effects  Alcohol abuse causes health problems. Sometimes this can happen after only drinking a  little.\" There is no set number of drinks or amount of alcohol that defines too much. The more you drink at one time, and the more frequently you drink determine both the short-term and long-term health effects. It affects all parts of your body and your health, including your:    Brain. Alcohol is a central nervous system depressant. It can damage parts of the brain that affect your balance, memory, thinking, and emotions. It can cause memory loss, blackouts, depression, agitation, sleep cycle changes, and seizures. These changes may or may not be reversible.    Heart and vascular system. Alcohol affects multiple areas. It can damage heart muscle causing cardiomyopathy, which is a weakening and stretching of the heart muscle. This can lead to trouble breathing, an irregular heartbeat, atrial fibrillation, leg swelling, and heart failure. It makes the blood vessels stiffen causing hypertension (high blood pressure). All of these problems increase your risk of having heart attacks or strokes.    Liver. Alcohol causes fat to build up in the liver, affecting its normal function. This increases the risk for hepatitis, leading to abdominal pain, appetite loss, jaundice, bleeding problems, liver fibrosis, and cirrhosis. This in turn can affect your ability to fight off infections, and can cause " diabetes. The liver changes prevent it from removing toxins in your blood that can cause encephalopathy. Signs of this are confusion, altered level of consciousness, personality changes, memory loss, seizures, coma, and death.    Pancreas. Alcohol can cause inflammation of the pancreas, or pancreatitis. This can cause pain in your abdomen, fever, and diabetes.    Immune system. Alcohol weakens your immune system in a number of ways. It suppresses your immune system making it harder to fight off infections and colds. You will also have a higher risk of certain infections like pneumonia and tuberculosis.    Cancer risk. Alcohol raises your risk of cancer of the mouth, esophagus, pharynx, larynx, liver, and breast.    Sexual function. Alcohol abuse can also lead to sexual problems.  Alcohol use during pregnancy may cause permanent damage to the growing baby.  Home care  The following guidelines will help you care for yourself at home:    Don't drink any more alcohol.    Don't drive until all effects of the alcohol have worn off.    Don't operate machinery that can cause injuries.    Get lots of rest over the next few days. Drink plenty of water and other non-alcoholic liquids. Try to eat regular meals.    If you have been drinking heavily on a daily basis, you may go through alcohol withdrawal. The usual symptoms last 3 to 4 days and may include nervousness, shakiness, nausea, sweating, sleeplessness, and can even cause seizures and a serious withdrawal symptom called delirium tremens, or DTs. During this time, it is best that you stay with family or friends who can help and support you. You can also admit yourself to a residential detox program. If your symptoms are severe (seizures, severe shakiness, confusion), contact your doctor or call an ambulance for help (see below).   Follow-up care  If alcohol is a problem in your life, these are some organizations that can help you:    Alcoholics Anonymous offers support  through a self-help fellowship. There are no dues or fees. See the Yellow Pages and call for time and place of meetings. Find AA online at www.aa.org.    [employer] offers support to families of alcohol users. Contact 497-509-0802, or online at www.alemanuel.org.    National [place] on Alcoholism and Drug Dependence can be reached at 076-923-6026, or online at www.ncadd.org.    There are also inpatient and residential alcohol detox programs. Check the Internet or phonebook Yellow Pages under  Drug Abuse and Treatment Centers.   Call 911  Call 911 if any of these occur:    Trouble breathing or slow irregular breathing    Chest pain    Sudden weakness on one side of your body or sudden trouble speaking    Heavy bleeding or vomiting blood    Very drowsy or trouble awakening    Fainting or loss of consciousness    Rapid heart rate    Seizure  When to seek medical advice  Call your healthcare provider right away if any of these occur:    Severe shakiness     Fever of 100.4 F (38 C) or higher, or as directed by your healthcare provider    Confusion or hallucinations (seeing, hearing, or feeling things that are not there)    Pain in your upper abdomen that gets worse    Repeated vomiting  Date Last Reviewed: 6/1/2016 2000-2017 [employer]. [address] [place] [address], [place] [address]. All rights reserved. This information is not intended as a substitute for professional medical care. Always follow your healthcare professional's instructions.          24 Hour Appointment Hotline       To make an appointment at any [facility], call [phone] ([phone]). If you don't have a family doctor or clinic, we will help you find one. [facility] are conveniently located to serve the needs of you and your family.             Review of your medicines      Our records show that you are taking the medicines listed below. If these are incorrect, please call your family doctor or clinic.        Dose /  Directions Last dose taken    albuterol 108 (90 Base) MCG/ACT inhaler   Commonly known as:  PROAIR HFA/PROVENTIL HFA/VENTOLIN HFA   Dose:  2 puff   Indication:  Asthma        Inhale 2 puffs into the lungs 4 times daily as needed for shortness of breath / dyspnea or wheezing   Refills:  0                Orders Needing Specimen Collection     None      Pending Results     No orders found from 10/30/2018 to 11/2/2018.            Pending Culture Results     No orders found from 10/30/2018 to 11/2/2018.            Pending Results Instructions     If you had any lab results that were not finalized at the time of your Discharge, you can call the ED Lab Result RN at [phone]. You will be contacted by this team for any positive Lab results or changes in treatment. The nurses are available 7 days a week from 10A to 6:30P.  You can leave a message 24 hours per day and they will return your call.        Test Results From Your Hospital Stay               Clinical Quality Measure: Blood Pressure Screening     Your blood pressure was checked while you were in the emergency department today. The last reading we obtained was  BP: (!) 144/113 . Please read the guidelines below about what these numbers mean and what you should do about them.  If your systolic blood pressure (the top number) is less than 120 and your diastolic blood pressure (the bottom number) is less than 80, then your blood pressure is normal. There is nothing more that you need to do about it.  If your systolic blood pressure (the top number) is 120-139 or your diastolic blood pressure (the bottom number) is 80-89, your blood pressure may be higher than it should be. You should have your blood pressure rechecked within a year by a primary care provider.  If your systolic blood pressure (the top number) is 140 or greater or your diastolic blood pressure (the bottom number) is 90 or greater, you may have high blood pressure. High blood pressure is treatable, but  "if left untreated over time it can put you at risk for heart attack, stroke, or kidney failure. You should have your blood pressure rechecked by a primary care provider within the next 4 weeks.  If your provider in the emergency department today gave you specific instructions to follow-up with your doctor or provider even sooner than that, you should follow that instruction and not wait for up to 4 weeks for your follow-up visit.        Thank you for choosing [facility]       Thank you for choosing [facility] for your care. Our goal is always to provide you with excellent care. Hearing back from our patients is one way we can continue to improve our services. Please take a few minutes to complete the written survey that you may receive in the mail after you visit with us. Thank you!        [employer] Information     [employer] lets you send messages to your doctor, view your test results, renew your prescriptions, schedule appointments and more. To sign up, go to [email or web address] . Click on \"Log in\" on the left side of the screen, which will take you to the Welcome page. Then click on \"Sign up Now\" on the right side of the page.     You will be asked to enter the access code listed below, as well as some personal information. Please follow the directions to create your username and password.     Your access code is: [email or web address]  Expires: 2018  5:13 PM     Your access code will  in 90 days. If you need help or a new code, please call your [facility] clinic or [phone].        Care EveryWhere ID     This is your Care EveryWhere ID. This could be used by other organizations to access your [facility] medical records  [id number]        Equal Access to Services     [facility]: Hadduane Simon, wavinicio malave, qaflorencio de la o. So [facility] [phone].    ATENCIÓN: Si habla español, tiene a [place] disposición servicios gratuitos de asistencia " sophie Yancarson al [phone].    We comply with applicable federal civil rights laws and [place] laws. We do not discriminate on the basis of race, color, national origin, age, disability, sex, sexual orientation, or gender identity.            After Visit Summary       This is your record. Keep this with you and show to your community pharmacist(s) and doctor(s) at your next visit.

## 2018-11-01 NOTE — ED AVS SNAPSHOT
Emergency Department    [address] [place] [address]    Phone:  [phone]    Fax:  [phone]                                       Sidney Varghese   MRN: [id number]    Department:   Emergency Department   Date of Visit:  11/1/2018           After Visit Summary Signature Page     I have received my discharge instructions, and my questions have been answered. I have discussed any challenges I see with this plan with the nurse or doctor.    ..........................................................................................................................................  Patient/Patient Representative Signature      ..........................................................................................................................................  Patient Representative Print Name and Relationship to Patient    ..................................................               ................................................  Date                                   Time    ..........................................................................................................................................  Reviewed by Signature/Title    ...................................................              ..............................................  Date                                               Time          22EPIC Rev 08/18

## 2018-11-01 NOTE — ED NOTES
Pt walked in secure area from EMS bay. Gait steady, speech clear. Pt denies suicidal thoughts or plan.

## 2018-11-01 NOTE — PROGRESS NOTES
Pt may contact the following detox facilities to inquire about a bed/help:     [facility]: [phone] (typically best to call right away in the morning)  [facility]: [phone]  [facility]: Substance abuse assessments are provided on a daily basis. Walk-ins are accommodated, if possible.  Monday to Friday, 8 a.m. to 7 p.m.  To schedule an assessment, call [phone].    Pt may contact your county of residence for a substance abuse assessment: [phone]

## 2018-11-01 NOTE — ED PROVIDER NOTES
"  History     Chief Complaint:  Alcohol Intoxication    HPI   Sidney Varghese is a 53 year old male with a history of tobacco use and alcoholism who presents with alcohol intoxication. EMS reports that they received a call from the patient's , stating he told the officer that \"he was at the end of his rope.\" The  took this as a possible suicidal statement, and thus, contacted the police to bring him to the emergency department for evaluation. Here in the ED, the patient states that this statement was not about suicidal ideations, though rather his frustration with his alcohol abuse. He has been abusing alcohol for several years, has been in and out of treatment, and current drinks about \"1 quart\" a day. He was last sober approximately a year ago, and was in treatment 2 years ago. He adds that he presented to [facility] the other day in hopes of getting a bed for inpatient treatment, though was unsuccessful and was discharged home. Here today, he would like to try for a bed again, though refuses to go to [address] [place] [address] Detox. Of note, he says he has never experienced withdrawal seizures in detox in the past. He has a history of anxiety and depression, though he does not take medication for these.     Allergies:  NKDA    Medications:    Albuterol    Past Medical History:    Cancer  Depression  Scoliosis  Substance abuse  Suicidal ideation    Past Surgical History:    C4-6 cervical fusion  Right ankle surgery    Family History:    Diabetes  Colon cancer  CVD  Heart disease  PAO    Social History:  Marital Status:  Single [1]  Current Smoker  Alcohol use: yes     Review of Systems   Psychiatric/Behavioral: Positive for dysphoric mood. Negative for self-injury and suicidal ideas.   All other systems reviewed and are negative.    Physical Exam     Patient Vitals for the past 24 hrs:   BP Temp Temp src Heart Rate Resp SpO2 Height Weight   11/01/18 1656 (!) 144/113 98.1  F (36.7  C) Oral 107 " "14 98 % 1.803 m (5' 11\") 74.8 kg (165 lb)     Physical Exam  General/Appearance: appears stated age, well-groomed, appears comfortable, clinically sober  Eyes: EOMI, no scleral injection, no icterus  ENT: MMM  Neck: supple, nl ROM, no stiffness  Cardiovascular: RRR, nl S1S2, no m/r/g, 2+ pulses in all 4 extremities, cap refill <2sec  Respiratory: CTAB, good air movement throughout, no wheezes/rhonchi/rales, no increased WOB, no retractions  Back: no lesions  GI: abd soft, non-distended, nttp,  no HSM, no rebound, no guarding, nl BS  MSK: SANON, good tone, no bony abnormality  Skin: warm and well-perfused, no rash, no edema, no ecchymosis, nl turgor  Neuro: GCS 15, alert and oriented, no gross focal neuro deficits  Psych: interacts appropriately  Heme: no petechia, no purpura, no active bleeding    Emergency Department Course     Emergency Department Course:  Nursing notes and vitals reviewed. (9529) I performed an exam of the patient as documented above.     No detox beds were available for the patient, and he would like to be discharged.    Findings and plan explained to the Patient. Patient discharged home with instructions regarding supportive care, medications, and reasons to return. The importance of close follow-up was reviewed.    I personally answered all related questions prior to discharge.     Impression & Plan      Medical Decision Making:  Sidney Varghese is a 53 year old male, chronic alcohol abuse, who presents secondary to alcohol intoxication.  He apparently made a statement to his  earlier today that was interpreted as a suicidal statement.  Here the patient states his statement was made out of frustration with his alcohol abuse and not secondary to suicidal ideation.  After speaking with him I do not sense any true suicidal ideation, markedly passive or active.  He does not have access to weapons.  Clinically he is sober.  We offered him detox however he does not wish to go to [address] is full.  Because of this he will be discharged.    Diagnosis:    ICD-10-CM    1. Alcoholic intoxication without complication (H) F10.920      Disposition:  discharged to home    Scribe Disclosure:  I, Dario Iqbal, am serving as a scribe on 11/1/2018 at 5:19 PM to personally document services performed by Sofya Yañez MD, based on my observations and the provider's statements to me.     Dario Iqbal.  11/1/2018    EMERGENCY DEPARTMENT       Sofya Yañez MD  11/01/18 2053

## 2018-11-02 ENCOUNTER — HOSPITAL ENCOUNTER (EMERGENCY)
Facility: CLINIC | Age: 53
Discharge: HOME | End: 2018-11-02
Attending: PSYCHIATRY & NEUROLOGY | Admitting: PSYCHIATRY & NEUROLOGY
Payer: COMMERCIAL

## 2018-11-02 VITALS
BODY MASS INDEX: 23.71 KG/M2 | WEIGHT: 170 LBS | OXYGEN SATURATION: 96 % | RESPIRATION RATE: 16 BRPM | HEART RATE: 113 BPM | DIASTOLIC BLOOD PRESSURE: 77 MMHG | SYSTOLIC BLOOD PRESSURE: 120 MMHG | TEMPERATURE: 98.2 F

## 2018-11-02 DIAGNOSIS — F10.220 ACUTE ALCOHOLIC INTOXICATION IN ALCOHOLISM WITHOUT COMPLICATION (H): ICD-10-CM

## 2018-11-02 LAB — ALCOHOL BREATH TEST: 0.19 (ref 0–0.01)

## 2018-11-02 PROCEDURE — 82075 ASSAY OF BREATH ETHANOL: CPT | Performed by: PSYCHIATRY & NEUROLOGY

## 2018-11-02 PROCEDURE — 99283 EMERGENCY DEPT VISIT LOW MDM: CPT | Performed by: PSYCHIATRY & NEUROLOGY

## 2018-11-02 PROCEDURE — 99283 EMERGENCY DEPT VISIT LOW MDM: CPT | Mod: Z6 | Performed by: PSYCHIATRY & NEUROLOGY

## 2018-11-02 PROCEDURE — 99285 EMERGENCY DEPT VISIT HI MDM: CPT | Mod: 25 | Performed by: PSYCHIATRY & NEUROLOGY

## 2018-11-02 PROCEDURE — 90791 PSYCH DIAGNOSTIC EVALUATION: CPT

## 2018-11-02 ASSESSMENT — ENCOUNTER SYMPTOMS
SHORTNESS OF BREATH: 0
DYSPHORIC MOOD: 0
HALLUCINATIONS: 0
ABDOMINAL PAIN: 0
NERVOUS/ANXIOUS: 0
FEVER: 0

## 2018-11-02 NOTE — ED AVS SNAPSHOT
[facility], Emergency Department    [address]    [place] [address]    Phone:  [phone]    Fax:  [phone]                                       Sidney Varghese   MRN: [id number]    Department:  [facility], Emergency Department   Date of Visit:  11/2/2018           Patient Information     Date Of Birth          1965        Your diagnoses for this visit were:     Acute alcoholic intoxication in alcoholism without complication (H)        You were seen by Dexter Bowser MD.        Discharge Instructions       Follow up with your rule 25  in order to get into CD treatment    You can call [phone] for detox bed availability at [facility] if you feel you need detox    24 Hour Appointment Hotline       To make an appointment at any [facility] clinic, call [phone] ([phone]). If you don't have a family doctor or clinic, we will help you find one. [facility] clinics are conveniently located to serve the needs of you and your family.             Review of your medicines      Our records show that you are taking the medicines listed below. If these are incorrect, please call your family doctor or clinic.        Dose / Directions Last dose taken    albuterol 108 (90 Base) MCG/ACT inhaler   Commonly known as:  PROAIR HFA/PROVENTIL HFA/VENTOLIN HFA   Dose:  2 puff   Indication:  Asthma        Inhale 2 puffs into the lungs 4 times daily as needed for shortness of breath / dyspnea or wheezing   Refills:  0                Procedures and tests performed during your visit     Alcohol breath test POCT      Orders Needing Specimen Collection     Ordered          11/02/18 1726  Drug abuse screen 6 urine (tox) - STAT, [facility]: STAT, Needs to be Collected     Scheduled Task Status   11/02/18 1727 Collect Drug abuse screen 6 urine (tox) Open   Order Class:  PCU Collect                  Pending Results     No orders found from 10/31/2018 to 11/3/2018.            Pending Culture Results     No  "orders found from 10/31/2018 to 11/3/2018.            Pending Results Instructions     If you had any lab results that were not finalized at the time of your Discharge, you can call the ED Lab Result RN at [phone]. You will be contacted by this team for any positive Lab results or changes in treatment. The nurses are available 7 days a week from 10A to 6:30P.  You can leave a message 24 hours per day and they will return your call.        Thank you for choosing [facility]       Thank you for choosing [facility] for your care. Our goal is always to provide you with excellent care. Hearing back from our patients is one way we can continue to improve our services. Please take a few minutes to complete the written survey that you may receive in the mail after you visit with us. Thank you!        [employer] Information     [employer] lets you send messages to your doctor, view your test results, renew your prescriptions, schedule appointments and more. To sign up, go to [email or web address] . Click on \"Log in\" on the left side of the screen, which will take you to the Welcome page. Then click on \"Sign up Now\" on the right side of the page.     You will be asked to enter the access code listed below, as well as some personal information. Please follow the directions to create your username and password.     Your access code is: [email or web address]  Expires: 2018  5:13 PM     Your access code will  in 90 days. If you need help or a new code, please call your [facility] clinic or [phone].        Care EveryWhere ID     This is your Care EveryWhere ID. This could be used by other organizations to access your [facility] medical records  [id number]        Equal Access to Services     [facility]CH[facility] : Hadii jodie Simon, mckay malave, florencio hannah. So [facility] [phone].    ATENCIÓN: Si habla español, tiene a [place] disposición servicios gratuitos de asistencia " sophie Yancarson al [phone].    We comply with applicable federal civil rights laws and [place] laws. We do not discriminate on the basis of race, color, national origin, age, disability, sex, sexual orientation, or gender identity.            After Visit Summary       This is your record. Keep this with you and show to your community pharmacist(s) and doctor(s) at your next visit.

## 2018-11-02 NOTE — TELEPHONE ENCOUNTER
Patient says he was seen in the ED for alcohol intoxication but feels they should have kept him or sent him to a facility instead of releasing him and just giving him numbers to call.  FNA advised it depends on if he meets certain criterea to be admitted or not.  Patient asks if he says he was suicidal, would the hospital keep him.  Patient then said he has passive SI; drinks so much that sometimes he does not want to wake up.  FNA advised patient that being admitted requires an evaluation (ie additional questions) before admission, in addition to the question about SI.  Caller verbalizes understanding.  Patient says he is ok tonight but then asked for [facility] ED address.  FNA gave address to patient per his request.    Additional Information    Negative: [1] Caller is not with the adult (patient) AND [2] reporting urgent symptoms    Negative: Lab result questions    Negative: Medication questions    Negative: Caller cannot be reached by phone    Negative: Caller has already spoken to PCP or another triager    Negative: RN needs further essential information from caller in order to complete triage    Negative: Requesting regular office appointment    Negative: [1] Caller requesting NON-URGENT health information AND [2] PCP's office is the best resource    Health Information question, no triage required and triager able to answer question    Protocols used: INFORMATION ONLY CALL-ADULT-

## 2018-11-02 NOTE — DISCHARGE INSTRUCTIONS
Follow up with your rule 25  in order to get into CD treatment    You can call [phone] for detox bed availability at [facility] if you feel you need detox

## 2018-11-02 NOTE — ED AVS SNAPSHOT
[facility], [place], Emergency Department    [address]    [place] [facility] [place] [facility] [place] [address]    Phone:  [phone]    Fax:  [phone]                                       Sidney Varghese   MRN: [id number]    Department:  [facility], Emergency Department   Date of Visit:  11/2/2018           After Visit Summary Signature Page     I have received my discharge instructions, and my questions have been answered. I have discussed any challenges I see with this plan with the nurse or doctor.    ..........................................................................................................................................  Patient/Patient Representative Signature      ..........................................................................................................................................  Patient Representative Print Name and Relationship to Patient    ..................................................               ................................................  Date                                   Time    ..........................................................................................................................................  Reviewed by Signature/Title    ...................................................              ..............................................  Date                                               Time          22EPIC Rev 08/18

## 2018-11-05 ENCOUNTER — HOSPITAL ENCOUNTER (EMERGENCY)
Facility: CLINIC | Age: 53
Discharge: HOME | End: 2018-11-05
Attending: EMERGENCY MEDICINE | Admitting: EMERGENCY MEDICINE
Payer: COMMERCIAL

## 2018-11-05 VITALS
DIASTOLIC BLOOD PRESSURE: 95 MMHG | RESPIRATION RATE: 18 BRPM | TEMPERATURE: 98.1 F | HEART RATE: 100 BPM | OXYGEN SATURATION: 96 % | BODY MASS INDEX: 23.8 KG/M2 | WEIGHT: 170 LBS | SYSTOLIC BLOOD PRESSURE: 134 MMHG | HEIGHT: 71 IN

## 2018-11-05 DIAGNOSIS — F10.29 ALCOHOL DEPENDENCE WITH UNSPECIFIED ALCOHOL-INDUCED DISORDER (H): ICD-10-CM

## 2018-11-05 LAB
ALCOHOL BREATH TEST: 0.22 (ref 0–0.01)
AMPHETAMINES UR QL SCN: NEGATIVE
BARBITURATES UR QL: NEGATIVE
BENZODIAZ UR QL: NEGATIVE
CANNABINOIDS UR QL SCN: NEGATIVE
COCAINE UR QL: NEGATIVE
ETHANOL UR QL SCN: POSITIVE
OPIATES UR QL SCN: NEGATIVE

## 2018-11-05 PROCEDURE — 99284 EMERGENCY DEPT VISIT MOD MDM: CPT | Mod: Z6 | Performed by: EMERGENCY MEDICINE

## 2018-11-05 PROCEDURE — 99285 EMERGENCY DEPT VISIT HI MDM: CPT | Mod: 25 | Performed by: EMERGENCY MEDICINE

## 2018-11-05 PROCEDURE — 80307 DRUG TEST PRSMV CHEM ANLYZR: CPT | Performed by: EMERGENCY MEDICINE

## 2018-11-05 PROCEDURE — 82075 ASSAY OF BREATH ETHANOL: CPT | Performed by: EMERGENCY MEDICINE

## 2018-11-05 PROCEDURE — 80320 DRUG SCREEN QUANTALCOHOLS: CPT | Performed by: EMERGENCY MEDICINE

## 2018-11-05 PROCEDURE — 90791 PSYCH DIAGNOSTIC EVALUATION: CPT

## 2018-11-05 ASSESSMENT — ENCOUNTER SYMPTOMS
NAUSEA: 0
FEVER: 0
SHORTNESS OF BREATH: 0
ABDOMINAL PAIN: 0
TREMORS: 0
SEIZURES: 0
VOMITING: 0

## 2018-11-05 NOTE — ED AVS SNAPSHOT
[facility], [place], Emergency Department    [address]    [place] [address]    Phone:  [phone]    Fax:  [phone]                                       Sidney Varghese   MRN: [id number]    Department:  [facility], Emergency Department   Date of Visit:  11/5/2018           After Visit Summary Signature Page     I have received my discharge instructions, and my questions have been answered. I have discussed any challenges I see with this plan with the nurse or doctor.    ..........................................................................................................................................  Patient/Patient Representative Signature      ..........................................................................................................................................  Patient Representative Print Name and Relationship to Patient    ..................................................               ................................................  Date                                   Time    ..........................................................................................................................................  Reviewed by Signature/Title    ...................................................              ..............................................  Date                                               Time          22EPIC Rev 08/18

## 2018-11-05 NOTE — ED AVS SNAPSHOT
[facility], Emergency Department    [address]    [place] [address]    Phone:  [phone]    Fax:  [phone]                                       Sidney Varghese   MRN: [id number]    Department:  [facility], Emergency Department   Date of Visit:  11/5/2018           Patient Information     Date Of Birth          1965        Your diagnoses for this visit were:     Alcohol dependence with unspecified alcohol-induced disorder (H)        You were seen by Oleg Dickey DO.        Discharge Instructions         Follow-up with your regular physician.  Return the emergency department for any new or worsening symptoms.    Recovering from Addiction    Recovery means making a new life for yourself. This includes finding new interests. It involves building new relationships. It means taking better care of yourself. These will all help you replace substance use with a new and healthier life. They will also help you avoid the things that could make you want to use again.  Make lifestyle changes  A big part of recovery is changing habits. These are habits that may have led to your substance abuse. It s also a time for personal growth. Below are some changes you may want to make.    Find new activities and goals. You may want to try new hobbies and interests. Or, you may want to join an activity group to meet new people.    Build relationships. You may choose to spend more time with loved ones you lost touch with while you were using. You may also want to make new friends. And there may be some friends or family members you will not want to see because they are still using.    Exercise and eat well. Get some physical activity on most days. This can help you feel better. Eat healthy meals with lots of fruits, vegetables, and whole grains. This can also help your well-being. A nutritionist or fitness expert can help you.    Maintain ties with medical and addiction professionals. While you may  not need intense professional support, it is important to have reliable safety nets so you can access professional assistance when needed.    Relax and get enough sleep. Good sleep can help you feel better. So can less stress. Ask your counselor about relaxation exercises. These may include meditation. Also ask about stress management classes.  Date Last Reviewed: 2/1/2017 2000-2018 [employer]. [address] [place] [address], [place] [address]. All rights reserved. This information is not intended as a substitute for professional medical care. Always follow your healthcare professional's instructions.          24 Hour Appointment Hotline       To make an appointment at any [facility], call [phone] ([phone]). If you don't have a family doctor or clinic, we will help you find one. [facility] clinics are conveniently located to serve the needs of you and your family.             Review of your medicines      Our records show that you are taking the medicines listed below. If these are incorrect, please call your family doctor or clinic.        Dose / Directions Last dose taken    albuterol 108 (90 Base) MCG/ACT inhaler   Commonly known as:  PROAIR HFA/PROVENTIL HFA/VENTOLIN HFA   Dose:  2 puff   Indication:  Asthma        Inhale 2 puffs into the lungs 4 times daily as needed for shortness of breath / dyspnea or wheezing   Refills:  0                Procedures and tests performed during your visit     Alcohol breath test POCT    Drug abuse screen 6 urine (chem dep)      Orders Needing Specimen Collection     None      Pending Results     No orders found from 11/3/2018 to 11/6/2018.            Pending Culture Results     No orders found from 11/3/2018 to 11/6/2018.            Pending Results Instructions     If you had any lab results that were not finalized at the time of your Discharge, you can call the ED Lab Result RN at [phone]. You will be contacted by this team for any positive Lab  "results or changes in treatment. The nurses are available 7 days a week from 10A to 6:30P.  You can leave a message 24 hours per day and they will return your call.        Thank you for choosing [facility]       Thank you for choosing [facility] for your care. Our goal is always to provide you with excellent care. Hearing back from our patients is one way we can continue to improve our services. Please take a few minutes to complete the written survey that you may receive in the mail after you visit with us. Thank you!        [employer] Information     [employer] lets you send messages to your doctor, view your test results, renew your prescriptions, schedule appointments and more. To sign up, go to [email or web address] . Click on \"Log in\" on the left side of the screen, which will take you to the Welcome page. Then click on \"Sign up Now\" on the right side of the page.     You will be asked to enter the access code listed below, as well as some personal information. Please follow the directions to create your username and password.     Your access code is: [email or web address]  Expires: 2018  4:13 PM     Your access code will  in 90 days. If you need help or a new code, please call your [facility] clinic or [phone].        Care EveryWhere ID     This is your Care EveryWhere ID. This could be used by other organizations to access your [facility] medical records  [id number]        Equal Access to Services     NAYA FLETCHER : Hadii jodie Simon, waaxda ananda, qaybta kaalmaflorencio palmer . So [facility] [phone].    ATENCIÓN: Si habla español, tiene a holbrook disposición servicios gratuitos de asistencia lingüística. Llame al [phone].    We comply with applicable federal civil rights laws and [place] laws. We do not discriminate on the basis of race, color, national origin, age, disability, sex, sexual orientation, or gender identity.            After Visit Summary  "      This is your record. Keep this with you and show to your community pharmacist(s) and doctor(s) at your next visit.

## 2018-11-06 NOTE — ED PROVIDER NOTES
[facility] EMERGENCY DEPARTMENT ([facility])    11/05/18       History     Chief Complaint   Patient presents with     Alcohol Problem     drinks a quart of alcohol daily. Last drink was 3 hours ago. Patient reported he has bed in detox.      Suicidal     Intermittent suicidal thoughts with a plan to shot self or hang self.     The history is provided by the patient.     Sidney Varghese is a 53 year old male with a medical history significant for alcohol abuse and depression who presents to the Emergency Department seeking detox from alcohol.  Patient did call ahead and has a bed on hold.  Patient has been drinking on a daily basis since January of this year; prior to that he was sober for approximately 6 months.  Patient was hospitalized in December 2017 and placed on a commitment, after he was discharged from the hospital he began drinking again.  Patient reports that he is drinking approximately 1 quart of whiskey per day with his last drink at approximately 3 PM today.  He denies any other drug use.  He denies any history of withdrawal seizures.  Patient reports that his withdrawal symptoms are typically mild tremors and diaphoresis.  He denies having nausea and vomiting with withdrawals.  Patient denies any withdrawal symptoms currently in the Emergency Department.  Patient reports that he has had intermittent thoughts of hurting himself and is interested in talking with a BEC .  He does not have any active suicidal thoughts right now.  Patient states that he does not feel he will attempt to hurt or kill himself while in detox.     I have reviewed the Medications, Allergies, Past Medical and Surgical History, and Social History in the [employer] system.    Past Medical History:   Diagnosis Date     Cancer (H)      Depressive disorder      Scoliosis      Substance abuse (H)        Past Surgical History:   Procedure Laterality Date     C4-6 cervical fusion  [id number]     ORTHOPEDIC SURGERY  2016    R  "ankle        Family History   Problem Relation Age of Onset     Diabetes Mother      Colon Cancer Mother      Diabetes Father      Cerebrovascular Disease Father      HEART DISEASE Father      Sleep Apnea Brother        Social History   Substance Use Topics     Smoking status: Current Every Day Smoker     Packs/day: 0.50     Types: Cigarettes     Smokeless tobacco: Never Used     Alcohol use Yes      Comment: one quart of whiskey a day       No current facility-administered medications for this encounter.      Current Outpatient Prescriptions   Medication     albuterol (PROAIR HFA/PROVENTIL HFA/VENTOLIN HFA) 108 (90 BASE) MCG/ACT Inhaler      No Known Allergies      Review of Systems   Constitutional: Negative for fever.   Respiratory: Negative for shortness of breath.    Cardiovascular: Negative for chest pain.   Gastrointestinal: Negative for abdominal pain, nausea and vomiting.   Neurological: Negative for tremors and seizures.   Psychiatric/Behavioral: Positive for suicidal ideas (intermittent). Negative for self-injury.   All other systems reviewed and are negative.      Physical Exam   BP: (!) 134/95  Pulse: 100  Temp: 98.1  F (36.7  C)  Resp: 18  Height: 180.3 cm (5' 11\")  Weight: 77.1 kg (170 lb)  SpO2: 96 %      Physical Exam   Constitutional: He is oriented to person, place, and time. He appears well-developed and well-nourished. No distress.   HENT:   Head: Normocephalic and atraumatic.   Eyes: No scleral icterus.   Neck: Normal range of motion. Neck supple.   Pulmonary/Chest: No respiratory distress.   Abdominal: He exhibits no distension.   Neurological: He is alert and oriented to person, place, and time.   Skin: Skin is warm and dry. No rash noted. He is not diaphoretic. No erythema. No pallor.   Psychiatric: He has a normal mood and affect. His behavior is normal.       ED Course   7:47 PM  The patient was seen and examined by Oleg Dickey DO in Room ED16B.     ED Course     Procedures       "     Labs Ordered and Resulted from Time of ED Arrival Up to the Time of Departure from the ED   DRUG ABUSE SCREEN 6 CHEM DEP URINE ([facility]) - Abnormal; Notable for the following:        Result Value    Ethanol Qual Urine Positive (*)     All other components within normal limits   ALCOHOL BREATH TEST POCT - Abnormal; Notable for the following:     Alcohol Breath Test 0.217 (*)     All other components within normal limits     Results for orders placed or performed during the hospital encounter of 11/05/18 (from the past 24 hour(s))   Alcohol breath test POCT   Result Value Ref Range    Alcohol Breath Test 0.217 (A) 0.00 - 0.01   Drug abuse screen 6 urine (chem dep)   Result Value Ref Range    Amphetamine Qual Urine Negative NEG^Negative    Barbiturates Qual Urine Negative NEG^Negative    Benzodiazepine Qual Urine Negative NEG^Negative    Cannabinoids Qual Urine Negative NEG^Negative    Cocaine Qual Urine Negative NEG^Negative    Ethanol Qual Urine Positive (A) NEG^Negative    Opiates Qualitative Urine Negative NEG^Negative              Assessments & Plan (with Medical Decision Making)   53-year-old male presents with a chief complaint of alcohol intoxication and initially requesting detox.  Patient also requested to speak with the mental health evaluator.  He stated to her that he is primarily interested in coming into the detox unit for assistance in finding residential treatment and housing.  It was explained to the patient that we do not admit people to the detox unit to help with  housing issues.  Patient admits he is not currently actively homicidal or suicidal.  See mental health evaluator's note for further details.    I have reviewed the nursing notes.    I have reviewed the findings, diagnosis, plan and need for follow up with the patient.    Discharge Medication List as of 11/5/2018  9:40 PM          Final diagnoses:   Alcohol dependence with unspecified alcohol-induced disorder (H)     ICarlos am  serving as a trained medical scribe to document services personally performed by Oleg Dickey DO, based on the provider's statements to me.   I, Oleg Dickey DO, was physically present and have reviewed and verified the accuracy of this note documented by Carlos Pugh.    11/5/2018   [facility], [place], EMERGENCY DEPARTMENT     Oleg Dickey DO  11/05/18 [id number]

## 2018-11-06 NOTE — DISCHARGE INSTRUCTIONS
Follow-up with your regular physician.  Return the emergency department for any new or worsening symptoms.    Recovering from Addiction    Recovery means making a new life for yourself. This includes finding new interests. It involves building new relationships. It means taking better care of yourself. These will all help you replace substance use with a new and healthier life. They will also help you avoid the things that could make you want to use again.  Make lifestyle changes  A big part of recovery is changing habits. These are habits that may have led to your substance abuse. It s also a time for personal growth. Below are some changes you may want to make.    Find new activities and goals. You may want to try new hobbies and interests. Or, you may want to join an activity group to meet new people.    Build relationships. You may choose to spend more time with loved ones you lost touch with while you were using. You may also want to make new friends. And there may be some friends or family members you will not want to see because they are still using.    Exercise and eat well. Get some physical activity on most days. This can help you feel better. Eat healthy meals with lots of fruits, vegetables, and whole grains. This can also help your well-being. A nutritionist or fitness expert can help you.    Maintain ties with medical and addiction professionals. While you may not need intense professional support, it is important to have reliable safety nets so you can access professional assistance when needed.    Relax and get enough sleep. Good sleep can help you feel better. So can less stress. Ask your counselor about relaxation exercises. These may include meditation. Also ask about stress management classes.  Date Last Reviewed: 2/1/2017 2000-2018 [employer]. [address] [place] [address], [place] [address]. All rights reserved. This information is not intended as a substitute for professional  medical care. Always follow your healthcare professional's instructions.

## 2019-04-01 ENCOUNTER — HOSPITAL ENCOUNTER (EMERGENCY)
Facility: CLINIC | Age: 54
Discharge: HOME | End: 2019-04-01
Attending: EMERGENCY MEDICINE | Admitting: EMERGENCY MEDICINE
Payer: COMMERCIAL

## 2019-04-01 VITALS
OXYGEN SATURATION: 96 % | WEIGHT: 170 LBS | HEART RATE: 115 BPM | RESPIRATION RATE: 20 BRPM | DIASTOLIC BLOOD PRESSURE: 130 MMHG | BODY MASS INDEX: 23.71 KG/M2 | SYSTOLIC BLOOD PRESSURE: 205 MMHG | TEMPERATURE: 99.1 F

## 2019-04-01 DIAGNOSIS — F10.920 ALCOHOLIC INTOXICATION WITHOUT COMPLICATION (H): ICD-10-CM

## 2019-04-01 LAB
ALCOHOL BREATH TEST: 0.12 (ref 0–0.01)
ALCOHOL BREATH TEST: 0.17 (ref 0–0.01)
ALCOHOL BREATH TEST: 0.24 (ref 0–0.01)

## 2019-04-01 PROCEDURE — 82075 ASSAY OF BREATH ETHANOL: CPT

## 2019-04-01 PROCEDURE — 90791 PSYCH DIAGNOSTIC EVALUATION: CPT

## 2019-04-01 PROCEDURE — 99285 EMERGENCY DEPT VISIT HI MDM: CPT | Mod: 25

## 2019-04-01 ASSESSMENT — ENCOUNTER SYMPTOMS
NECK PAIN: 1
AGITATION: 1

## 2019-04-01 NOTE — ED PROVIDER NOTES
[facility] ED Behavioral Health Handoff Note:       Brief HPI:  This is a 54 year old male signed out to me by Dr. Russo .  See initial ED Provider note for details of the presentation.     Patient was allowed to sober, seen by DEC, and no indication for MH hospitalization.  He verbalized a plan tog et rule 25 assessment and has resources for chem dep. D.C home with sober ride.          The patient has not required medication for agitation.      Exam:   Temp:  [99.1  F (37.3  C)] 99.1  F (37.3  C)  Pulse:  [115] 115  Heart Rate:  [115] 115  Resp:  [20] 20  BP: (205)/(130) 205/130  SpO2:  [96 %] 96 %      ED Course:    There were no significant events while under my care.            Impression:    ICD-10-CM    1. Alcoholic intoxication without complication (H) F10.920        Plan:    1. Dischaged      RESULTS:   Results for orders placed or performed during the hospital encounter of 04/01/19 (from the past 24 hour(s))   Alcohol breath test POCT     Status: Abnormal    Collection Time: 04/01/19 11:07 AM   Result Value Ref Range    Alcohol Breath Test 0.24 (A) 0.00 - 0.01   Alcohol breath test POCT     Status: Abnormal    Collection Time: 04/01/19  1:40 PM   Result Value Ref Range    Alcohol Breath Test 0.17 (A) 0.00 - 0.01   Alcohol breath test POCT     Status: Abnormal    Collection Time: 04/01/19  3:28 PM   Result Value Ref Range    Alcohol Breath Test 0.12 (A) 0.00 - 0.01             Yosef Barr, Yosef Renteria MD  04/02/19 0107

## 2019-04-01 NOTE — ED PROVIDER NOTES
"  History     Chief Complaint:  Suicidal and Alcohol Intoxication      HPI   Sidney Varghese is a 54 year old male with history of substance abuse and depression who presents to the emergency department today for evaluation of suicidal ideations and alcohol intoxication. Per EMS and patient report the patient was found by police after he was trying to contact his  and the police became involved. He was found to be intoxicated and EMS were called, and the patient presented to the emergency department today. The patient says he has been heavily drinking for the past nine months. He says he has been in treatment 7 previous times, with most recently being 1.5 years ago. He says that he drinks because he doesn't want to wake up in the morning, but he denies being suicidal. Per EMS he is intoxicated and he told them he drinks 1/2 gallon of booze everyday, last drink an hour or hour and a half ago. They report he told them he wanted to \"walk out into traffic\". They report he lives with his parents and helps take care of him.  He says that he fell off of a bobcat 1 month prior which caused neck pain, and saw care for this and the MRI was negative. He adds that because of this he has been laying in bed for the past 8 days.  He notes that the alcohol helps with the pain in his neck.  He doesn't have any primary care.       Allergies:  No Known Drug Allergies        Medications:    albuterol (PROAIR HFA/PROVENTIL HFA/VENTOLIN HFA) 108 (90 BASE) MCG/ACT Inhaler      Past Medical History:    Cancer  Depressive disorder  Scoliosis  Substance abuse      Past Surgical History:    C4-C6 cervical fusion  Right ankle surgery      Family History:    Diabetes  Colon Cancer  Cerebrovascular disease  Heart Disease  Sleep apnea      Social History:  The patient was accompanied to the ED by EMS.  Smoking Status: Current Every Day Smoker  Smokeless Tobacco: Never Used  Alcohol Use: Yes   Marital Status:  Single [1]       Review " of Systems   Musculoskeletal: Positive for neck pain.   Psychiatric/Behavioral: Positive for agitation.   All other systems reviewed and are negative.      Physical Exam     Patient Vitals for the past 24 hrs:   BP Temp Temp src Pulse Heart Rate Resp SpO2 Weight   04/01/19 1103 (!) 205/130 99.1  F (37.3  C) Oral 115 115 20 96 % 77.1 kg (170 lb)        Physical Exam    General: Patient is alert and interactive when I enter the room  Head:  The scalp, face, and head appear normal  Eyes:  Conjunctivae are normal  ENT:    The nose is normal    Pinnae are normal    External acoustic canals are normal  Neck:  Trachea midline  CV:  Pulses are normal   Resp:  No respiratory distress   Abdomen:      Soft, non-tender, non-distended  Musc:  Normal muscular tone    No major joint effusions    No asymmetric leg swelling  Skin:  No rash or lesions noted  Neuro: Slurred speech. Face is symmetric.     Moving all extremities well.   Psych: Awake. Alert.  Normal affect.  Appropriate interactions.      Emergency Department Course       Emergency Department Course:  Nursing notes and vitals reviewed.  1101: I performed an exam of the patient as documented above.   1310 Patient rechecked and updated.    Patient will be signed out to oncoming provider to be assessed by DEC when sober.  I personally reviewed the laboratory  results with the Patient and answered all related questions prior to sign out   Impression & Plan      Medical Decision Making:  Sidney Varghese is a 54 year old male who presents for evaluation of alcohol abuse and suicidal ideations. He is intoxicated here in ED by breathalyzer, but refuses blood work. There are no signs of co-ingestion. Due to suicidal ideation concern patient will need to be assessed by DEC when sober. He has no signs of trauma related to alcohol use and no further workup is needed including head CT. Patient will be signed out to oncoming provider.      Diagnosis:    ICD-10-CM    1. Alcoholic  intoxication without complication (H) F10.920        Disposition:  Patient signed out to oncoming provider.    Scribe Disclosure:  I, Ivy Michele, am serving as a scribe at 11:06 AM on 4/1/2019 to document services personally performed by Parisa Russo MD based on my observations and the provider's statements to me.    Ivy Michele  4/1/2019   [facility] EMERGENCY DEPARTMENT       Parisa Russo MD  04/20/19 0338

## 2019-04-01 NOTE — ED NOTES
Security remain present outside patients door and patient remains on a 1:1 suicide precaution with ERT sitter at his door for continuous observation.

## 2019-04-01 NOTE — ED NOTES
"Security notified at 1329 due to escalating behaviors. Patient states he is \"leaving in 30 minutes so you better get thing figured out before then\". I informed him that his ETOH level needs to be at or below 0.08 so that he can talk with DEC for his mental health/chemical dependency evaluation. He repeated \"then re-test me now, I'm still leaving in 30 minutes, starting NOW\". ETOH level now 0.17 by breathalyzer. Dr Russo notified and in to talk with patient.   "

## 2019-04-01 NOTE — ED NOTES
Bed: ED06  Expected date: 4/1/19  Expected time: 10:38 AM  Means of arrival: Ambulance  Comments:  Sana [address]- [phone], M, ETOH, MH lyn, on a HOLD

## 2019-04-01 NOTE — ED NOTES
"I was in to talk with patient. He reports he has been trying to get in contact with his  for about a week because he wants to get into treatment. He says he has been leaving messages because she has been out of town. He thinks he may have also left messages at the [facility]. He says he received a call on Saturday inquiring about his safety. He says today the police were at his door, \"there were 4 of them and they said I had made threats about walking out into traffic and getting hit, and I did tell them 'a person could get hit if he were to walk into traffic', then they said I had to go with them\".  Calm and cooperative with clear speech during my interview.   "

## 2019-04-01 NOTE — ED TRIAGE NOTES
"Patient here via EMS. Patient reports he has been drinking very heavy for months. He reports he has been in treatment many times. \"I go to bed every evening hoping I don't wake up\". He also reports he fell off of a bobcat 3 weeks ago and has been having neck and shoulder pain since, has been resting in bed for 8 days.     EMS reported he is intoxicated and he told them he drinks 1/2 gallon of booze everyday, last drink an hour or hour and a half ago. They report he told them he wanted to \"walk out into traffic\". They report he lives with his parents and helps take care of him.     Arrives hostile. Security notified of patients arrival. He declined BP at first, but then agreed. He adamantly is refusing blood work. He does agree to a breathalyzer.   "

## 2019-04-01 NOTE — ED AVS SNAPSHOT
[facility] Emergency Department  [address]  [facility] [address]  Phone:  [phone]  Fax:  [phone]                                    Sidney Varghese   MRN: [id number]    Department:  [facility] Emergency Department   Date of Visit:  4/1/2019           After Visit Summary Signature Page    I have received my discharge instructions, and my questions have been answered. I have discussed any challenges I see with this plan with the nurse or doctor.    ..........................................................................................................................................  Patient/Patient Representative Signature      ..........................................................................................................................................  Patient Representative Print Name and Relationship to Patient    ..................................................               ................................................  Date                                   Time    ..........................................................................................................................................  Reviewed by Signature/Title    ...................................................              ..............................................  Date                                               Time          22EPIC Rev 08/18

## 2019-04-02 NOTE — ED NOTES
Pt refused to get vitals. Gave pt belongings. Instructed pt that there was a phone in the lobby if he wanted to call anyone.

## 2019-05-03 ENCOUNTER — HOSPITAL ENCOUNTER (OUTPATIENT)
Dept: LAB | Facility: CLINIC | Age: 54
Discharge: HOME | End: 2019-05-03
Attending: PSYCHIATRY & NEUROLOGY | Admitting: PSYCHIATRY & NEUROLOGY
Payer: COMMERCIAL

## 2019-05-03 DIAGNOSIS — Z79.899 ENCOUNTER FOR LONG-TERM (CURRENT) USE OF HIGH-RISK MEDICATION: ICD-10-CM

## 2019-05-03 DIAGNOSIS — F41.1 GENERALIZED ANXIETY DISORDER: ICD-10-CM

## 2019-05-03 DIAGNOSIS — F33.0 MDD (MAJOR DEPRESSIVE DISORDER), RECURRENT EPISODE, MILD (H): Primary | ICD-10-CM

## 2019-05-03 LAB
ALBUMIN SERPL-MCNC: 4 G/DL (ref 3.4–5)
ALP SERPL-CCNC: 110 U/L (ref 40–150)
ALT SERPL W P-5'-P-CCNC: 40 U/L (ref 0–70)
AST SERPL W P-5'-P-CCNC: 22 U/L (ref 0–45)
BILIRUB DIRECT SERPL-MCNC: <0.1 MG/DL (ref 0–0.2)
BILIRUB SERPL-MCNC: 0.2 MG/DL (ref 0.2–1.3)
CHOLEST SERPL-MCNC: 191 MG/DL
DEPRECATED CALCIDIOL+CALCIFEROL SERPL-MC: 15 UG/L (ref 20–75)
GLUCOSE SERPL-MCNC: 80 MG/DL (ref 70–99)
HDLC SERPL-MCNC: 63 MG/DL
LDLC SERPL CALC-MCNC: 99 MG/DL
NONHDLC SERPL-MCNC: 128 MG/DL
PROT SERPL-MCNC: 8.5 G/DL (ref 6.8–8.8)
TRIGL SERPL-MCNC: 145 MG/DL

## 2019-05-03 PROCEDURE — 36415 COLL VENOUS BLD VENIPUNCTURE: CPT | Performed by: PSYCHIATRY & NEUROLOGY

## 2019-05-03 PROCEDURE — 80061 LIPID PANEL: CPT | Performed by: PSYCHIATRY & NEUROLOGY

## 2019-05-03 PROCEDURE — 80076 HEPATIC FUNCTION PANEL: CPT | Performed by: PSYCHIATRY & NEUROLOGY

## 2019-05-03 PROCEDURE — 82306 VITAMIN D 25 HYDROXY: CPT | Performed by: PSYCHIATRY & NEUROLOGY

## 2019-05-03 PROCEDURE — 82947 ASSAY GLUCOSE BLOOD QUANT: CPT | Performed by: PSYCHIATRY & NEUROLOGY

## 2021-06-03 ENCOUNTER — APPOINTMENT (OUTPATIENT)
Dept: CT IMAGING | Facility: CLINIC | Age: 56
End: 2021-06-03
Attending: EMERGENCY MEDICINE
Payer: COMMERCIAL

## 2021-06-03 ENCOUNTER — HOSPITAL ENCOUNTER (OUTPATIENT)
Facility: CLINIC | Age: 56
Setting detail: OBSERVATION
Discharge: LEFT BEFORE DISCHARGE | End: 2021-06-03
Attending: EMERGENCY MEDICINE | Admitting: HOSPITALIST
Payer: COMMERCIAL

## 2021-06-03 VITALS
HEART RATE: 80 BPM | DIASTOLIC BLOOD PRESSURE: 108 MMHG | TEMPERATURE: 96.9 F | OXYGEN SATURATION: 99 % | WEIGHT: 175 LBS | RESPIRATION RATE: 17 BRPM | HEIGHT: 71 IN | BODY MASS INDEX: 24.5 KG/M2 | SYSTOLIC BLOOD PRESSURE: 162 MMHG

## 2021-06-03 DIAGNOSIS — R07.9 CHEST PAIN, UNSPECIFIED TYPE: ICD-10-CM

## 2021-06-03 LAB
ALBUMIN SERPL-MCNC: 4.2 G/DL (ref 3.4–5)
ALP SERPL-CCNC: 102 U/L (ref 40–150)
ALT SERPL W P-5'-P-CCNC: 49 U/L (ref 0–70)
ANION GAP SERPL CALCULATED.3IONS-SCNC: 23 MMOL/L (ref 3–14)
AST SERPL W P-5'-P-CCNC: 85 U/L (ref 0–45)
BASOPHILS # BLD AUTO: 0 10E9/L (ref 0–0.2)
BASOPHILS NFR BLD AUTO: 0.3 %
BILIRUB DIRECT SERPL-MCNC: 0.3 MG/DL (ref 0–0.2)
BILIRUB SERPL-MCNC: 1.9 MG/DL (ref 0.2–1.3)
BUN SERPL-MCNC: 13 MG/DL (ref 7–30)
CALCIUM SERPL-MCNC: 9.2 MG/DL (ref 8.5–10.1)
CHLORIDE SERPL-SCNC: 98 MMOL/L (ref 94–109)
CO2 BLDCOV-SCNC: 11 MMOL/L (ref 21–28)
CO2 SERPL-SCNC: 10 MMOL/L (ref 20–32)
CREAT BLD-MCNC: 0.9 MG/DL (ref 0.66–1.25)
CREAT SERPL-MCNC: 0.93 MG/DL (ref 0.66–1.25)
D DIMER PPP FEU-MCNC: 1.2 UG/ML FEU (ref 0–0.5)
DIFFERENTIAL METHOD BLD: ABNORMAL
EOSINOPHIL # BLD AUTO: 0 10E9/L (ref 0–0.7)
EOSINOPHIL NFR BLD AUTO: 0.5 %
ERYTHROCYTE [DISTWIDTH] IN BLOOD BY AUTOMATED COUNT: 12.5 % (ref 10–15)
ETHANOL SERPL-MCNC: <0.01 G/DL
GFR SERPL CREATININE-BSD FRML MDRD: 88 ML/MIN/{1.73_M2}
GFR SERPL CREATININE-BSD FRML MDRD: >90 ML/MIN/{1.73_M2}
GLUCOSE SERPL-MCNC: 98 MG/DL (ref 70–99)
HCT VFR BLD AUTO: 47.9 % (ref 40–53)
HGB BLD-MCNC: 15.9 G/DL (ref 13.3–17.7)
IMM GRANULOCYTES # BLD: 0 10E9/L (ref 0–0.4)
IMM GRANULOCYTES NFR BLD: 0.3 %
INTERPRETATION ECG - MUSE: NORMAL
INTERPRETATION ECG - MUSE: NORMAL
LABORATORY COMMENT REPORT: NORMAL
LACTATE BLD-SCNC: 1.1 MMOL/L (ref 0.7–2.1)
LYMPHOCYTES # BLD AUTO: 1.3 10E9/L (ref 0.8–5.3)
LYMPHOCYTES NFR BLD AUTO: 20.9 %
MCH RBC QN AUTO: 35.2 PG (ref 26.5–33)
MCHC RBC AUTO-ENTMCNC: 33.2 G/DL (ref 31.5–36.5)
MCV RBC AUTO: 106 FL (ref 78–100)
MONOCYTES # BLD AUTO: 0.6 10E9/L (ref 0–1.3)
MONOCYTES NFR BLD AUTO: 9.7 %
NEUTROPHILS # BLD AUTO: 4.4 10E9/L (ref 1.6–8.3)
NEUTROPHILS NFR BLD AUTO: 68.3 %
NRBC # BLD AUTO: 0 10*3/UL
NRBC BLD AUTO-RTO: 0 /100
PCO2 BLDV: 25 MM HG (ref 40–50)
PH BLDV: 7.26 PH (ref 7.32–7.43)
PLATELET # BLD AUTO: 259 10E9/L (ref 150–450)
PO2 BLDV: 51 MM HG (ref 25–47)
POTASSIUM SERPL-SCNC: 3.9 MMOL/L (ref 3.4–5.3)
PROT SERPL-MCNC: 8.6 G/DL (ref 6.8–8.8)
RBC # BLD AUTO: 4.52 10E12/L (ref 4.4–5.9)
SAO2 % BLDV FROM PO2: 81 %
SARS-COV-2 RNA RESP QL NAA+PROBE: NEGATIVE
SODIUM SERPL-SCNC: 131 MMOL/L (ref 133–144)
SPECIMEN SOURCE: NORMAL
T4 FREE SERPL-MCNC: 0.9 NG/DL (ref 0.76–1.46)
TROPONIN I SERPL-MCNC: <0.015 UG/L (ref 0–0.04)
TSH SERPL DL<=0.005 MIU/L-ACNC: 8.74 MU/L (ref 0.4–4)
WBC # BLD AUTO: 6.4 10E9/L (ref 4–11)

## 2021-06-03 PROCEDURE — 85379 FIBRIN DEGRADATION QUANT: CPT | Performed by: EMERGENCY MEDICINE

## 2021-06-03 PROCEDURE — G0378 HOSPITAL OBSERVATION PER HR: HCPCS

## 2021-06-03 PROCEDURE — 99285 EMERGENCY DEPT VISIT HI MDM: CPT | Mod: 25

## 2021-06-03 PROCEDURE — 93005 ELECTROCARDIOGRAM TRACING: CPT

## 2021-06-03 PROCEDURE — 85025 COMPLETE CBC W/AUTO DIFF WBC: CPT | Performed by: EMERGENCY MEDICINE

## 2021-06-03 PROCEDURE — 82565 ASSAY OF CREATININE: CPT

## 2021-06-03 PROCEDURE — 87635 SARS-COV-2 COVID-19 AMP PRB: CPT | Performed by: EMERGENCY MEDICINE

## 2021-06-03 PROCEDURE — 84439 ASSAY OF FREE THYROXINE: CPT | Performed by: EMERGENCY MEDICINE

## 2021-06-03 PROCEDURE — C9803 HOPD COVID-19 SPEC COLLECT: HCPCS

## 2021-06-03 PROCEDURE — 250N000013 HC RX MED GY IP 250 OP 250 PS 637: Performed by: EMERGENCY MEDICINE

## 2021-06-03 PROCEDURE — 84484 ASSAY OF TROPONIN QUANT: CPT | Performed by: EMERGENCY MEDICINE

## 2021-06-03 PROCEDURE — 93005 ELECTROCARDIOGRAM TRACING: CPT | Mod: 76

## 2021-06-03 PROCEDURE — 250N000011 HC RX IP 250 OP 636: Performed by: EMERGENCY MEDICINE

## 2021-06-03 PROCEDURE — 83605 ASSAY OF LACTIC ACID: CPT

## 2021-06-03 PROCEDURE — 96374 THER/PROPH/DIAG INJ IV PUSH: CPT | Mod: 59

## 2021-06-03 PROCEDURE — 99236 HOSP IP/OBS SAME DATE HI 85: CPT | Performed by: PHYSICIAN ASSISTANT

## 2021-06-03 PROCEDURE — 84484 ASSAY OF TROPONIN QUANT: CPT | Performed by: PHYSICIAN ASSISTANT

## 2021-06-03 PROCEDURE — 80048 BASIC METABOLIC PNL TOTAL CA: CPT | Performed by: EMERGENCY MEDICINE

## 2021-06-03 PROCEDURE — 82077 ASSAY SPEC XCP UR&BREATH IA: CPT | Performed by: EMERGENCY MEDICINE

## 2021-06-03 PROCEDURE — 71275 CT ANGIOGRAPHY CHEST: CPT

## 2021-06-03 PROCEDURE — 84443 ASSAY THYROID STIM HORMONE: CPT | Performed by: EMERGENCY MEDICINE

## 2021-06-03 PROCEDURE — 80076 HEPATIC FUNCTION PANEL: CPT | Performed by: EMERGENCY MEDICINE

## 2021-06-03 PROCEDURE — 82803 BLOOD GASES ANY COMBINATION: CPT

## 2021-06-03 PROCEDURE — 258N000003 HC RX IP 258 OP 636: Performed by: EMERGENCY MEDICINE

## 2021-06-03 PROCEDURE — 96361 HYDRATE IV INFUSION ADD-ON: CPT

## 2021-06-03 PROCEDURE — 250N000009 HC RX 250: Performed by: EMERGENCY MEDICINE

## 2021-06-03 RX ORDER — HALOPERIDOL 5 MG/ML
2.5-5 INJECTION INTRAMUSCULAR EVERY 6 HOURS PRN
Status: DISCONTINUED | OUTPATIENT
Start: 2021-06-03 | End: 2021-06-03 | Stop reason: HOSPADM

## 2021-06-03 RX ORDER — BISACODYL 10 MG
10 SUPPOSITORY, RECTAL RECTAL DAILY PRN
Status: DISCONTINUED | OUTPATIENT
Start: 2021-06-03 | End: 2021-06-03 | Stop reason: HOSPADM

## 2021-06-03 RX ORDER — IOPAMIDOL 755 MG/ML
500 INJECTION, SOLUTION INTRAVASCULAR ONCE
Status: COMPLETED | OUTPATIENT
Start: 2021-06-03 | End: 2021-06-03

## 2021-06-03 RX ORDER — MULTIPLE VITAMINS W/ MINERALS TAB 9MG-400MCG
1 TAB ORAL DAILY
Status: DISCONTINUED | OUTPATIENT
Start: 2021-06-03 | End: 2021-06-03 | Stop reason: HOSPADM

## 2021-06-03 RX ORDER — AMOXICILLIN 250 MG
1 CAPSULE ORAL 2 TIMES DAILY PRN
Status: DISCONTINUED | OUTPATIENT
Start: 2021-06-03 | End: 2021-06-03 | Stop reason: HOSPADM

## 2021-06-03 RX ORDER — GABAPENTIN 600 MG/1
1200 TABLET ORAL ONCE
Status: DISCONTINUED | OUTPATIENT
Start: 2021-06-03 | End: 2021-06-03 | Stop reason: HOSPADM

## 2021-06-03 RX ORDER — PROCHLORPERAZINE 25 MG
25 SUPPOSITORY, RECTAL RECTAL EVERY 12 HOURS PRN
Status: DISCONTINUED | OUTPATIENT
Start: 2021-06-03 | End: 2021-06-03 | Stop reason: HOSPADM

## 2021-06-03 RX ORDER — NITROGLYCERIN 0.4 MG/1
0.4 TABLET SUBLINGUAL EVERY 5 MIN PRN
Status: DISCONTINUED | OUTPATIENT
Start: 2021-06-03 | End: 2021-06-03 | Stop reason: HOSPADM

## 2021-06-03 RX ORDER — FLUMAZENIL 0.1 MG/ML
0.2 INJECTION, SOLUTION INTRAVENOUS
Status: DISCONTINUED | OUTPATIENT
Start: 2021-06-03 | End: 2021-06-03 | Stop reason: HOSPADM

## 2021-06-03 RX ORDER — HYDROMORPHONE HYDROCHLORIDE 1 MG/ML
0.5 INJECTION, SOLUTION INTRAMUSCULAR; INTRAVENOUS; SUBCUTANEOUS ONCE
Status: COMPLETED | OUTPATIENT
Start: 2021-06-03 | End: 2021-06-03

## 2021-06-03 RX ORDER — LANOLIN ALCOHOL/MO/W.PET/CERES
200 CREAM (GRAM) TOPICAL 3 TIMES DAILY
Status: DISCONTINUED | OUTPATIENT
Start: 2021-06-03 | End: 2021-06-03 | Stop reason: HOSPADM

## 2021-06-03 RX ORDER — LISINOPRIL 10 MG/1
10 TABLET ORAL DAILY
Status: DISCONTINUED | OUTPATIENT
Start: 2021-06-03 | End: 2021-06-03 | Stop reason: HOSPADM

## 2021-06-03 RX ORDER — GABAPENTIN 300 MG/1
300 CAPSULE ORAL EVERY 8 HOURS
Status: DISCONTINUED | OUTPATIENT
Start: 2021-06-08 | End: 2021-06-03 | Stop reason: HOSPADM

## 2021-06-03 RX ORDER — LORAZEPAM 1 MG/1
1-2 TABLET ORAL EVERY 30 MIN PRN
Status: DISCONTINUED | OUTPATIENT
Start: 2021-06-03 | End: 2021-06-03 | Stop reason: HOSPADM

## 2021-06-03 RX ORDER — LORAZEPAM 2 MG/ML
1-2 INJECTION INTRAMUSCULAR EVERY 30 MIN PRN
Status: DISCONTINUED | OUTPATIENT
Start: 2021-06-03 | End: 2021-06-03 | Stop reason: HOSPADM

## 2021-06-03 RX ORDER — LANOLIN ALCOHOL/MO/W.PET/CERES
100 CREAM (GRAM) TOPICAL DAILY
Status: DISCONTINUED | OUTPATIENT
Start: 2021-06-11 | End: 2021-06-03 | Stop reason: HOSPADM

## 2021-06-03 RX ORDER — LANOLIN ALCOHOL/MO/W.PET/CERES
100 CREAM (GRAM) TOPICAL 3 TIMES DAILY
Status: DISCONTINUED | OUTPATIENT
Start: 2021-06-05 | End: 2021-06-03 | Stop reason: HOSPADM

## 2021-06-03 RX ORDER — ONDANSETRON 4 MG/1
4 TABLET, ORALLY DISINTEGRATING ORAL EVERY 6 HOURS PRN
Status: DISCONTINUED | OUTPATIENT
Start: 2021-06-03 | End: 2021-06-03 | Stop reason: HOSPADM

## 2021-06-03 RX ORDER — HYDRALAZINE HYDROCHLORIDE 20 MG/ML
5 INJECTION INTRAMUSCULAR; INTRAVENOUS ONCE
Status: DISCONTINUED | OUTPATIENT
Start: 2021-06-03 | End: 2021-06-03

## 2021-06-03 RX ORDER — PROCHLORPERAZINE MALEATE 10 MG
10 TABLET ORAL EVERY 6 HOURS PRN
Status: DISCONTINUED | OUTPATIENT
Start: 2021-06-03 | End: 2021-06-03 | Stop reason: HOSPADM

## 2021-06-03 RX ORDER — AMOXICILLIN 250 MG
2 CAPSULE ORAL 2 TIMES DAILY PRN
Status: DISCONTINUED | OUTPATIENT
Start: 2021-06-03 | End: 2021-06-03 | Stop reason: HOSPADM

## 2021-06-03 RX ORDER — CLONIDINE HYDROCHLORIDE 0.1 MG/1
0.1 TABLET ORAL EVERY 8 HOURS
Status: DISCONTINUED | OUTPATIENT
Start: 2021-06-03 | End: 2021-06-03 | Stop reason: HOSPADM

## 2021-06-03 RX ORDER — OLANZAPINE 5 MG/1
5-10 TABLET, ORALLY DISINTEGRATING ORAL EVERY 6 HOURS PRN
Status: DISCONTINUED | OUTPATIENT
Start: 2021-06-03 | End: 2021-06-03 | Stop reason: HOSPADM

## 2021-06-03 RX ORDER — LANOLIN ALCOHOL/MO/W.PET/CERES
3 CREAM (GRAM) TOPICAL
Status: DISCONTINUED | OUTPATIENT
Start: 2021-06-03 | End: 2021-06-03 | Stop reason: HOSPADM

## 2021-06-03 RX ORDER — GABAPENTIN 300 MG/1
600 CAPSULE ORAL EVERY 8 HOURS
Status: DISCONTINUED | OUTPATIENT
Start: 2021-06-06 | End: 2021-06-03 | Stop reason: HOSPADM

## 2021-06-03 RX ORDER — ONDANSETRON 2 MG/ML
4 INJECTION INTRAMUSCULAR; INTRAVENOUS EVERY 6 HOURS PRN
Status: DISCONTINUED | OUTPATIENT
Start: 2021-06-03 | End: 2021-06-03 | Stop reason: HOSPADM

## 2021-06-03 RX ORDER — HYDRALAZINE HYDROCHLORIDE 20 MG/ML
10 INJECTION INTRAMUSCULAR; INTRAVENOUS EVERY 4 HOURS PRN
Status: DISCONTINUED | OUTPATIENT
Start: 2021-06-03 | End: 2021-06-03 | Stop reason: HOSPADM

## 2021-06-03 RX ORDER — GABAPENTIN 100 MG/1
100 CAPSULE ORAL EVERY 8 HOURS
Status: DISCONTINUED | OUTPATIENT
Start: 2021-06-10 | End: 2021-06-03 | Stop reason: HOSPADM

## 2021-06-03 RX ORDER — FOLIC ACID 1 MG/1
1 TABLET ORAL DAILY
Status: DISCONTINUED | OUTPATIENT
Start: 2021-06-03 | End: 2021-06-03 | Stop reason: HOSPADM

## 2021-06-03 RX ORDER — IBUPROFEN 600 MG/1
600 TABLET, FILM COATED ORAL EVERY 6 HOURS PRN
Status: DISCONTINUED | OUTPATIENT
Start: 2021-06-03 | End: 2021-06-03 | Stop reason: HOSPADM

## 2021-06-03 RX ORDER — GABAPENTIN 300 MG/1
900 CAPSULE ORAL EVERY 8 HOURS
Status: DISCONTINUED | OUTPATIENT
Start: 2021-06-03 | End: 2021-06-03 | Stop reason: HOSPADM

## 2021-06-03 RX ADMIN — IOPAMIDOL 58 ML: 755 INJECTION, SOLUTION INTRAVENOUS at 13:15

## 2021-06-03 RX ADMIN — SODIUM CHLORIDE 79 ML: 9 INJECTION, SOLUTION INTRAVENOUS at 13:17

## 2021-06-03 RX ADMIN — HYDROMORPHONE HYDROCHLORIDE 0.5 MG: 1 INJECTION, SOLUTION INTRAMUSCULAR; INTRAVENOUS; SUBCUTANEOUS at 13:05

## 2021-06-03 RX ADMIN — SODIUM CHLORIDE 1000 ML: 9 INJECTION, SOLUTION INTRAVENOUS at 14:38

## 2021-06-03 RX ADMIN — NITROGLYCERIN 0.4 MG: 0.4 TABLET SUBLINGUAL at 12:17

## 2021-06-03 ASSESSMENT — MIFFLIN-ST. JEOR: SCORE: 1650.92

## 2021-06-03 ASSESSMENT — ENCOUNTER SYMPTOMS
SHORTNESS OF BREATH: 1
TREMORS: 1
FEVER: 0
MYALGIAS: 1
ABDOMINAL PAIN: 1

## 2021-06-03 NOTE — PHARMACY-ADMISSION MEDICATION HISTORY
Admission medication history interview status for this patient is complete. See [facility] admission navigator for allergy information, prior to admission medications and immunization status.     Medication history interview done, indicate source(s): Patient  Medication history resources (including written lists, pill bottles, clinic record): SureScripts and Care Everywhere  Pharmacy: [facility] for discharge    Changes made to PTA medication list:  Added: none  Deleted: albuterol   Changed: none    Actions taken by pharmacist (provider contacted, etc): Spoke with patient about home med list     Additional medication history information: Pt is not currently taking any OTC or Rx medications. In February he was prescribed a 30 day supply of lexapro 10mg, folic acid, levothyroxine 100mcg, MVI, seroquel 200mg at bedtime, and trazodone 50-100mg at bedtime. He ran out 3 months ago.    Medication reconciliation/reorder completed by provider prior to medication history?  N   (Y/N)       Prior to Admission medications    Not on File

## 2021-06-03 NOTE — ED NOTES
Pt given boxed lunch and ice water. Observation video and brochure given. No complaints. Pain remains 4/10.

## 2021-06-03 NOTE — H&P
[facility]  History and Physical / Discharge Summary  Hospitalist       Date of Admission/Discharge:  6/3/2021    DISCHARGE DIAGNOSES:  1. Pleuritic lateral left-sided chest pain, low suspicion for ACS  2. Dehydration in setting of alcoholism with resulting metabolic acidosis and starvation ketosis   3. Alcoholism, severe, ongoing  4. Alcoholic liver disease  5. Hypertension with hypertensive urgency, untreated.  Patient refuses to take any medications  6. Hypothyroidism, untreated.  Patient refuses to take thyroid replacement.     DISPOSITION:  Patient leaving AMA     HISTORY OF PRESENT ILLNESS  Sidney Varghese is a 56 year old male with cognitive impairment, alcoholism, alcoholic liver disease, hypertension, and depression who presented to [facility] ED on 6/3/2021 with left-sided chest pain, abdominal pain, and shortness of breath.  Observation admission requested for chest pain rule out.     Symptoms of weakness have been present since Tuesday.  Patient reports shortness of breath with exertion, profound weakness, and very lateral, focal left-sided chest pain that worsens with inspiration.  No associated nausea, vomiting, diaphoresis, or radiation of pain.  He has been trying to stay hydrated with water but has a poor appetite and has been eating only a small amount of fruit.  He works as a  at a golf course and utilizes a riding .  He denies any change on this recent Tuesday and did not believe that the weather was too hot.  Notes his urine is very dark yellow and smells bad.  Last drink was yesterday.  He believes that he was drinking his usual amount for the last couple days.  He does not believe he is currently in withdrawal.  Because he was feeling so lousy (mainly with fatigue and shortness of breath), he contacted his provider who voiced concern that perhaps he was suffering a cardiac event and recommended that he be seen in the ER.       In the ED, /102, HR  86, RR 18, SPO2 95% at rest on room air, temperature 98.7.  Initial trop <0.015.  CMP abnormal with sodium 131, bicarb 10, anion gap 23, tBili 1.9, ALT 49 with AST 85.  CBC within normal limits.  DDimer elevated to 1.2 but CTA Chest negative for PE, infiltrate, or consolidation.  COVID PCR negative.  EtOH <0.01.  VBG with pH 7.26, pCO2 25, PO2 51, Bicarb 11.  EKG with NSR and some nonspecific ST changes.  Patient received IV dilaudid without improvement in pain.  Contacted by ED provider who requested patient come in for chest pain rule out.  BP markedly elevated in ED with diastolics to 113; improved with 5 mg IV hydralazine.  Asked that patient receive IV fluids and hydralazine before coming to the floor so could ensure that he was correcting appropriately.    Patient arrived on floor and reported ongoing lateral left chest pain that was localized to a very small area between the lower ribs.  It is not reproducible on exam but has not improved with any additional measures.  Blood pressure initially improved with hydralazine before rebounding.  Patient reports a longstanding history of hypertension but does not take any medications.  He also notes a prior history of hypothyroidism but does not believe this diagnosis and so has not been thyroid replacement either.  Cardiac risk factors include hypertension, which is untreated.      Discussed with patient my low suspicion that chest pain is cardiac given its characterization, location, and negative trop despite pain present for 48 hours.  Also discussed my concern that he is quite profoundly dehydrated and would benefit from additional fluids.  Patient, however, voiced a desire to leave.  He reports feeling better since receiving fluids in the ER.  I inquired about his plans to discharge and if he were planning to go home or who would drive him.  He stated he may instead go to a [employer] and he will contact his health insurance as they offer ride share services.  If  they are unable to provide a ride, he knows someone about a mile down the road and he will walk there.  Advised him that medical recommendation remains for him to stay for fluids and Echocardiogram but he voiced his intention to leave despite these recommendations.     Asked patient to return should symptoms persist or worsen.     Deirdre Red, PAC  Hospitalist Service  [facility]  Text Page (7AM - 5PM, M-F)      PRIMARY CARE PROVIDER: Physician No Ref-Primary    CHIEF COMPLAINT  Shortness of breath, chest paim    PAST MEDICAL HISTORY  1. Alcoholism, severe  2. Cognitive impairment, related to chronic alcoholism  3. Alcoholic liver disease  4. Hypertension, untreated  5. Hypothyroidism  6. Depression    PAST SURGICAL HISTORY  I have reviewed this patient's surgical history and updated it with pertinent information if needed.  Past Surgical History:   Procedure Laterality Date     C4-6 cervical fusion  [id number]     ORTHOPEDIC SURGERY  2016    R ankle        PRIOR TO ADMISSION MEDICATIONS  None       ALLERGIES  Allergies   Allergen Reactions     Acetaminophen Anaphylaxis and Unknown     Hydrocodone-Acetaminophen Other (See Comments)       SOCIAL HISTORY  Lives with a roommate. Daily tobacco and alcohol use.  Works at a golf course.     FAMILY HISTORY  I have reviewed this patient's family history and updated it with pertinent information if needed.   Family History   Problem Relation Age of Onset     Diabetes Mother      Colon Cancer Mother      Diabetes Father      Cerebrovascular Disease Father      Heart Disease Father      Sleep Apnea Brother        REVIEW OF SYSTEMS:  14 point comprehensive ROS undertaken with pertinent positives and negatives as above and otherwise unremarkable.     PHYSICAL EXAM  Temp: 98.7  F (37.1  C) Temp src: Oral BP: 126/88 Pulse: 84   Resp: 19 SpO2: 96 % O2 Device: None (Room air)    Vital Signs with Ranges  Temp:  [98.7  F (37.1  C)] 98.7  F (37.1  C)  Pulse:   [] 84  Resp:  [17-23] 19  BP: (123-143)/() 126/88  SpO2:  [92 %-97 %] 96 %  164 lbs 0 oz    GENERAL:  Pleasant, cooperative, alert. WDWN  HEENT: Normocephalic, atraumatic.  Extra occular mm intact.  Sclera clear. PERRL.  Mucous membranes moist.     PULMONARY: Clear to auscultation bilaterally    CARDIAC: Regular rate and rhythm.  No appreciated murmur.  Focal left sided chest pain in lower lateral ribs, unable to reproduce with palpation  ABDOMEN: Soft, nontender non distended.  Nontender to palpation of epigastrum   MUSCULOSKELETAL:  Moving x 4 spontaneously with CMS intact x4.  Normal bulk and tone.  No LE edema.    NEURO: Alert and oriented x3.  CN II-XII grossly intact and symmetric.  No ataxia or tremor.  Nonfocal.  SKIN:  Warm, pink, dry.    DATA  Data reviewed today:  I personally reviewed the EKG tracing showing some nonspecific ST changes (on my read) but NSR.    Recent Labs   Lab 06/03/21  1206   WBC 6.4   HGB 15.9   *      *   POTASSIUM 3.9   CHLORIDE 98   CO2 10*   BUN 13   CR 0.93   ANIONGAP 23*   CLEO 9.2   GLC 98   ALBUMIN 4.2   PROTTOTAL 8.6   BILITOTAL 1.9*   ALKPHOS 102   ALT 49   AST 85*   TROPI <0.015       Recent Results (from the past 24 hour(s))   CT Chest (PE) Abdomen Pelvis w Contrast    Narrative    CT CHEST PULMONARY EMBOLUS ABDOMEN AND PELVIS WITH CONTRAST 6/3/2021  1:32 PM    CLINICAL HISTORY: Left-sided chest pain with shortness of breath.   Left flank and left lower quadrant abdominal pain.  Concern for  pulmonary embolus, ACS, diverticulitis.    TECHNIQUE: CT angiogram chest and routine CT abdomen pelvis with IV  contrast. Arterial phase through the chest and venous phase through  the abdomen and pelvis. 2D and 3D MIP reconstructions were performed  by the CT technologist. Dose reduction techniques were used.     CONTRAST: 58mL [id number]-[id number]    COMPARISON: None.    FINDINGS:  ANGIOGRAM CHEST: Pulmonary arteries are normal caliber and negative  for pulmonary  emboli. Thoracic aorta is negative for dissection. No CT  evidence of right heart strain.     LUNGS AND PLEURA: Normal.    MEDIASTINUM/AXILLAE: Normal.    CORONARY ARTERY CALCIFICATION: Moderate.    HEPATOBILIARY: Diffuse low-attenuation of the liver compatible with  fatty infiltration. More focal fat deposition adjacent to the  falciform ligament. No worrisome focal liver lesions. Gallbladder  unremarkable.    PANCREAS: Normal.    SPLEEN: Normal.    ADRENAL GLANDS: Normal.    KIDNEYS/BLADDER: Normal.    BOWEL: Normal appendix. No bowel obstruction or free air.    LYMPH NODES: Normal.    PELVIC ORGANS: Normal.    OTHER: Mild nonaneurysmal aortic atherosclerosis. No lymphadenopathy  or ascites.    MUSCULOSKELETAL: Unremarkable.      Impression    IMPRESSION:  1.  No acute abnormality demonstrated in the chest, abdomen, or  pelvis.    CAREN HARRIS MD

## 2021-06-03 NOTE — ED TRIAGE NOTES
Patient arrives via EMS, patient has been having chest pain for 2 days. Tuesday patient was mowing and almost fell off mower due to the pain, patient was able to drive himself home but unable to get himself into the house until he rested in the yard for 1 hour.  Patient states the pain is constant but worse with activity. Patient alert and orientated. Smokes and drinks 1 pint of ETOH daily.  Patient also does not want to be in ED and states frequently he is going to leave.

## 2021-06-03 NOTE — ED PROVIDER NOTES
History   Chief Complaint:  Chest Pain     HPI   Sidney Varghese is a 56 year old male with history of alcoholism, cancer, substance abuse, and cervical fusion who presents with chest pain and shortness of breath for the past 2 days. Two days ago the patient was mowing and almost fell off of the mower due to the chest pain he was having. He was able to drive himself home from work but was unable to get himself into the house, so he rested in his yard for about 1 hour. He called EMS today because he felt very short of breath. En route his chest pain was a 6/10 in severity, and after being given 2 doses of nitroglycerin and aspirin his pain decreased to a 4/10 in severity. Here in the ED he currently endorses localized left-sided chest pain and shortness of breath that are exacerbated with movement, as well as shaking, abdominal pain, and mild bilateral arm pain. He denies fevers or leg swelling, and has no known history of hypertension/hyperlipidemia/diabetes/heart problems/MI. Of note the patient regularly consumes alcohol and smokes tobacco, consuming about a pint a day and half a pack a day, respectively. His last alcoholic beverage was last night and he has not been vaccinated for Covid. He does not believe he has experienced alcoholic withdrawal before.     Review of Systems   Constitutional: Negative for fever.   Respiratory: Positive for shortness of breath.    Cardiovascular: Positive for chest pain. Negative for leg swelling.   Gastrointestinal: Positive for abdominal pain.   Musculoskeletal: Positive for myalgias (bilateral arms).   Neurological: Positive for tremors.   All other systems reviewed and are negative.    Allergies:  Tylenol    Medications:  Albuterol  Levothyroxine  Lexapro  Seroquel  Trazodone    Past Medical History:    Cancer  Depressive Disorder  Scoliosis  Substance Abuse  Substance Dependence  Suicidal Ideation  Cognitive Deficits  Chronic Left Shoulder  "Pain  Snoring  Tachycardia  Alcoholism  Mental Health Disorder  Alcoholic Liver Damage    Past Surgical History:    C406 Cervical Fusion  Right Ankle    Family History:    Mother - Diabetes, Colon Cancer, Open Heart Surgery  Father - Diabetes, CVD, Heart Disease  Brother - Sleep Apnea    Social History:  Arrives via EMS    Physical Exam     Patient Vitals for the past 24 hrs:   BP Temp Temp src Pulse Resp SpO2 Height Weight   06/03/21 1522 (!) 162/108 96.9  F (36.1  C) Oral 80 17 99 % 1.803 m (5' 11\") 79.4 kg (175 lb)   06/03/21 1515 -- -- -- 82 15 96 % -- --   06/03/21 1500 (!) 137/95 -- -- 73 18 100 % -- --   06/03/21 1445 -- -- -- 91 15 99 % -- --   06/03/21 1430 126/88 -- -- 84 19 96 % -- --   06/03/21 1415 (!) 137/104 -- -- 86 17 93 % -- --   06/03/21 1400 (!) 123/103 -- -- 93 21 94 % -- --   06/03/21 1345 (!) 143/107 -- -- 87 22 97 % -- --   06/03/21 1300 (!) 134/93 -- -- 96 23 95 % -- --   06/03/21 1245 (!) 133/99 -- -- 91 21 95 % -- --   06/03/21 1230 (!) 141/107 -- -- 102 19 92 % -- --   06/03/21 1215 (!) 136/113 -- -- 84 19 95 % -- --   06/03/21 1204 (!) 131/102 98.7  F (37.1  C) Oral 86 18 95 % -- 74.4 kg (164 lb)     Physical Exam  General: Alert, appears well-developed and well-nourished. Cooperative.     In mild distress  HEENT:  Head:  Atraumatic  Ears:  External ears are normal  Mouth/Throat:  Oropharynx is without erythema or exudate and mucous membranes are moist.   Eyes:   Conjunctivae normal and EOM are normal. No scleral icterus.  CV:  Normal rate, regular rhythm, normal heart sounds and radial pulses are 2+ and symmetric.  No murmur.  Resp:  Breath sounds are clear bilaterally    Non-labored, no retractions or accessory muscle use  GI:  Abdomen is soft, no distension, no tenderness. No rebound or guarding.  No CVA tenderness bilaterally  MS:  Normal range of motion. No edema.    No chest wall tenderness to palpation.     Normal strength in all 4 extremities.     Back atraumatic.    No midline " cervical, thoracic, or lumbar tenderness  Skin:  Warm and dry.  No rash or lesions noted.  Neuro: Alert. Normal strength.  GCS: 15  Psych:  Normal mood and affect.    Emergency Department Course   ECG (12:01:21):  Indication: Screening for cardiovascular disease.   Rate 86 bpm. HI interval 164. QRS duration 78. QT/QTc 344/411. P-R-T axes 80 47 63.   Interpretation: Normal sinus rhythm.  Agree with computer interpretation.   No significant change compared to EKG dated 5/4/15.   Interpreted at 1203 by Dr. Umer Perry.     ECG (13:08:43):  Indication: Screening for cardiovascular disease.   Rate 92 bpm. HI interval 152. QRS duration 76. QT/QTc 334/410. P-R-T axes 67 34 49.   Interpretation: normal sinus rhythm  Agree with computer interpretation.   No significant change compared to EKG dated 5/4/15.   Interpreted at 1317 by Dr. Umer Perry.     Imaging:  CT Chest/Abdomen/Pelvis w contrast   IMPRESSION:   1.  No acute abnormality demonstrated in the chest, abdomen, or   pelvis.   Reading per radiology    Laboratory:  CBC: WBC 6.4, HGB 15.9,    BMP: Glucose 98,  (L), Carbon Dioxide 10 (L), Anion Gap 23 (H), o/w WNL (Creatinine 0.93)    Asymptomatic COVID19 Virus PCR by nasopharyngeal swab neg    ISTAT Gases Lactate Homero POCT: pH Venous 7.26 (L), PCO2 Venous 25 (L), PO2 Venous 51 (H), Bicarbonate Venous 11 (L), o/w WNL    Creatinine POCT: Creatinine 0.9, GFR 88, AWNL    Troponin: (Collected 1206) <0.015    TSH with free T4 Reflex: 8.74 (H)  T4 Free: 0.90    D Dimer: 1.2 (H)  Hepatic Panel: Bilirubin 0.3 (H), Bilirubin Total 1.9 (H), AST 85 (H), o/w WNL  Alcohol Ethyl: <0.01    Emergency Department Course:    Reviewed:  I reviewed past medical history and care everywhere    Assessments:  1207 I obtained history and examined the patient as noted above.   1405 I rechecked the patient and explained findings.   1408 Set for observation.     Consults:   1431 I spoke with Deirdre Red PA-C for Dr. Rod of the  hospitalist service from [facility] regarding patient's presentation, findings, and plan of care.    Interventions:  1217 Nitroglycerin 0.4 mg Sublingual  1305 Dilaudid 0.5 mg IV  1438 Normal Saline 1000 mL IV    Disposition:  The patient was admitted to the hospital under the care of Dr. Rahul Rod for observation.       HEART Score  Background  Calculates the overall risk of adverse event in patient's presenting with chest pain.  Based on 5 criteria (each assigned 0-2 points) including suspiciousness of history, EKG, age, risk factors and troponin.    Data  56 year old male  has Suicidal ideation and Substance dependence (H) on their problem list.   reports that he has been smoking cigarettes. He has been smoking about 0.50 packs per day. He has never used smokeless tobacco.  family history includes Cerebrovascular Disease in his father; Colon Cancer in his mother; Diabetes in his father and mother; Heart Disease in his father; Sleep Apnea in his brother.  Lab Results   Component Value Date    TROPI <0.015 06/03/2021     Criteria   0-2 points for each of 5 items (maximum of 10 points):  Score 2- History highly suspicious for coronary syndrome  Score 1- EKG with Non-specific repolarization disturbance  Score 1- Age 45 to 65 years old  Score 1- One to 2 risk factors for atherosclerotic disease  Score 0- Within normal limits for troponin levels  Interpretation  Risk of adverse outcome  Heart Score: 5  Total Score 4-6- Adverse Outcome Risk 20.3% - Supports admission with standard rule-out management -serial troponins and stress testing    Impression & Plan   Medical Decision Making:  Sidney Varghese is a 56 year old male who presents for evaluation of chest pain and SOB.    A broad differential was of course considered.  I considered a broad differential diagnosis in this patient including life-threatening etiologies such as acute coronary syndrome, myocardial infarction, pulmonary embolism, acute aortic  dissection, myocarditis, pericarditis, acute valvular insufficiency amongst others.  Other causes considered for this patient included pneumonia, pneumothorax, chest wall source, pericarditis, pleurisy, esophageal spasm, etc.  No serious etiology for the chest pain was detected.  Certainly ischemia remains in the differential.     My suspicion of unstable angina at this point is very low and given risk/benefit ratio would not start Lovenox or heparin. Given the nature and timing of the patient's symptoms, I will admit the patient  to the hospital for further workup and treatment.  The patient agrees to be admitted and all questions were answered.      He is still having some left sided chest pain but no improvement or minimal improvement with nitroglycerin and dilaudid.     Covid-19  Sidney Varghese was evaluated during a global COVID-19 pandemic, which necessitated consideration that the patient might be at risk for infection with the SARS-CoV-2 virus that causes COVID-19.   Applicable protocols for evaluation were followed during the patient's care.   COVID-19 was considered as part of the patient's evaluation. The plan for testing is:  a test was obtained during this visit.    Diagnosis:    ICD-10-CM    1. Chest pain, unspecified type  R07.9      Scribe Disclosure:  I, Karen Crockett, am serving as a scribe at 12:03 PM on 6/3/2021 to document services personally performed by Umer Perry MD based on my observations and the provider's statements to me.     This note was completed in part using Dragon voice recognition software. Although reviewed after completion, some word and grammatical errors may occur.     Umer Perry MD  06/03/21 4138

## 2021-06-03 NOTE — ED NOTES
[facility]  ED Nurse Handoff Report    Sidney Varghese is a 56 year old male   ED Chief complaint: Chest Pain  . ED Diagnosis:   Final diagnoses:   Chest pain, unspecified type     Allergies:   Allergies   Allergen Reactions     Acetaminophen Anaphylaxis and Unknown     Hydrocodone-Acetaminophen Other (See Comments)       Code Status: Full Code  Activity level - Baseline/Home:  Independent. Activity Level - Current:   Stand by Assist. Lift room needed: No. Bariatric: No   Needed: No   Isolation: No. Infection: Not Applicable.     Vital Signs:   Vitals:    06/03/21 1300 06/03/21 1345 06/03/21 1400 06/03/21 1415   BP: (!) 134/93 (!) 143/107 (!) 123/103 (!) 137/104   Pulse: 96 87 93 86   Resp: 23 22 21 17   Temp:       TempSrc:       SpO2: 95% 97% 94% 93%   Weight:           Cardiac Rhythm:  ,      Pain level:    Patient confused: No. Patient Falls Risk: Yes.   Elimination Status: Due to void.   Patient Report - Initial Complaint: Chest pain. Focused Assessment: 56 year old male with history of alcoholism, cancer, substance abuse, and cervical fusion who presents with chest pain and shortness of breath for the past 2 days. Two days ago the patient was mowing and almost fell off of the mower due to the pain. He was able to drive himself home from work but was unable to get himself into the house, so he rested in his yard for about 1 hour. He called EMS today because he felt very short of breath. En route his chest pain was a 6/10 in severity, and after being given 2 doses of nitroglycerin and aspirin his pain dropped to a 4/10 in severity. Here in the ED he currently endorses localized left-sided chest pain and shortness of breath that are exacerbated with movement, as well as shaking, abdominal pain, and mild bilateral arm pain. He denies fevers or leg swelling, and has no known history of hypertension/hyperlipidemia/diabetes/heart problems/MI. Of note the patient regularly consumes alcohol and  smokes tobacco, consuming about a pint a day and half a pack a day, respectively. His last alcoholic beverage was last night and he has not been vaccinated for Covid. He does not believe he has experienced alcoholic withdrawal before.      Review of Systems   Constitutional: Negative for fever.   Respiratory: Positive for shortness of breath.    Cardiovascular: Positive for chest pain. Negative for leg swelling.   Gastrointestinal: Positive for abdominal pain.   Musculoskeletal: Positive for myalgias (bilateral arms).   Neurological: Positive for tremors.   All other systems reviewed and are negative.   Tests Performed: Labs, EKG, CT. Abnormal Results:   Labs Ordered and Resulted from Time of ED Arrival Up to the Time of Departure from the ED   CBC WITH PLATELETS DIFFERENTIAL - Abnormal; Notable for the following components:       Result Value     (*)     MCH 35.2 (*)     All other components within normal limits   BASIC METABOLIC PANEL - Abnormal; Notable for the following components:    Sodium 131 (*)     Carbon Dioxide 10 (*)     Anion Gap 23 (*)     All other components within normal limits   TSH WITH FREE T4 REFLEX - Abnormal; Notable for the following components:    TSH 8.74 (*)     All other components within normal limits   D DIMER QUANTITATIVE - Abnormal; Notable for the following components:    D Dimer 1.2 (*)     All other components within normal limits   HEPATIC PANEL - Abnormal; Notable for the following components:    Bilirubin Direct 0.3 (*)     Bilirubin Total 1.9 (*)     AST 85 (*)     All other components within normal limits   ISTAT  GASES LACTATE YOUNG POCT - Abnormal; Notable for the following components:    Ph Venous 7.26 (*)     PCO2 Venous 25 (*)     PO2 Venous 51 (*)     Bicarbonate Venous 11 (*)     All other components within normal limits   TROPONIN I   SARS-COV-2 (COVID-19) VIRUS RT-PCR   ALCOHOL ETHYL   CREATININE POCT   T4 FREE   CARDIAC CONTINUOUS MONITORING   ISTAT CREATININE  NURSING POCT   ISTAT CG4 GASES LACTATE YOUNG NURSING POCT     CT Chest (PE) Abdomen Pelvis w Contrast   Final Result   IMPRESSION:   1.  No acute abnormality demonstrated in the chest, abdomen, or   pelvis.      CAERN HARRIS MD        .   Treatments provided: See MAR  Family Comments: No family here  OBS brochure/video discussed/provided to patient:  Yes  ED Medications:   Medications   nitroGLYcerin (NITROSTAT) sublingual tablet 0.4 mg (0.4 mg Sublingual Given 6/3/21 1217)   HYDROmorphone (PF) (DILAUDID) injection 0.5 mg (0.5 mg Intravenous Given 6/3/21 1305)   iopamidol (ISOVUE-370) solution 500 mL (58 mLs Intravenous Given 6/3/21 1315)   sodium chloride 0.9 % bag 500mL for CT scan flush use (79 mLs As instructed Given 6/3/21 1317)     Drips infusing:  No  For the majority of the shift, the patient's behavior Green. Interventions performed were N/A.    Sepsis treatment initiated: No     Patient tested for COVID 19 prior to admission: YES    ED Nurse Name/Phone Number: Cyndi Baum RN,   2:34 PM    RECEIVING UNIT ED HANDOFF REVIEW    Above ED Nurse Handoff Report was reviewed: Yes  Reviewed by: Lucy Pierre RN on Norma 3, 2021 at 3:09 PM

## 2022-01-09 ENCOUNTER — NURSE TRIAGE (OUTPATIENT)
Dept: NURSING | Facility: CLINIC | Age: 57
End: 2022-01-09
Payer: COMMERCIAL

## 2022-01-10 NOTE — TELEPHONE ENCOUNTER
"Pt states he was scratched by a cat between thumb and 2nd finger 2 days ago. This was a stray cat. He tried to grab it. States now he has \"a red line running all the way up my arm\". Reports laceration from scratch now oozing green pus and he \"took a knife, washed it under warm water and cut the fucking thing out\" but it is \"not working\" Advised ED now. Pt said no he wants to use the knife to cut more out and remove the vein. Told pt this is not safe at all and could lead to infection, deformity of hand resulting in loss of use and/or serious bleeding; need to come to ED. Pt then hung up.   Very grandiose and unreasonable, sounded possibly intoxicated.       Reason for Disposition    [1] Cut (length > 1/8 inch or 3 mm) or skin tear AND [2] any animal    Additional Information    Negative: [1] Major bleeding (e.g., actively dripping or spurting) AND [2] can't be stopped    Negative: Sounds like a life-threatening emergency to the triager    Negative: Snake bite    Negative: Bite, wound, or sting from fish    Negative: [1] Any break in skin from BITE (e.g., cut, puncture or scratch) AND[2] WILD animal at risk for RABIES (e.g., bat, raccoon, tobias, skunk, coyote, other carnivores)    Negative: [1] Any break in skin from BITE (e.g., cut, puncture or scratch) AND[2] PET animial (e.g., dog, cat, or ferret) at risk for RABIES (e.g., sick, stray, unprovoked bite, developing country)    Negative: [1] Any break in skin from BITE (e.g., cut, puncture or scratch) AND[2] monkey    Negative: [1] EXPOSURE of non-intact skin (e.g., exposed person has dermatitis, abrasion, wound) AND[2] with animal BODY FLUID (e.g., saliva such as licking, blood, brain) AND[3] animal at high-risk for RABIES (e.g., bat, raccoon, tobias, skunk, coyote, other carnivores)    Protocols used: ANIMAL BITE-A-AH      "

## 2022-01-24 ENCOUNTER — NURSE TRIAGE (OUTPATIENT)
Dept: NURSING | Facility: CLINIC | Age: 57
End: 2022-01-24
Payer: COMMERCIAL

## 2022-01-24 NOTE — TELEPHONE ENCOUNTER
Patient has a cat where he lives and was scratched by cat a lost cat and came to back door.    Location is left hand on web of hand.  Scratch happened about two weeks ago.  Patient states that he now has a hole in his hand and looks infected and pus drainage is present.  Patient states it was a stray cat and he does not know if rabies vaccine was ever given.    COVID 19 Nurse Triage Plan/Patient Instructions    Please be aware that novel coronavirus (COVID-19) may be circulating in the community. If you develop symptoms such as fever, cough, or SOB or if you have concerns about the presence of another infection including coronavirus (COVID-19), please contact your health care provider or visit [email or web address].     Disposition/Instructions    ED Visit recommended. Follow protocol based instructions.     Bring Your Own Device:  Please also bring your smart device(s) (smart phones, tablets, laptops) and their charging cables for your personal use and to communicate with your care team during your visit.    Thank you for taking steps to prevent the spread of this virus.  o Limit your contact with others.  o Wear a simple mask to cover your cough.  o Wash your hands well and often.    Resources    [facility]: About COVID-19: [email or web address]    CDC: What to Do If You're Sick: www.cdc.gov/coronavirus/2019-ncov/about/steps-when-sick.html    CDC: Ending Home Isolation: www.cdc.gov/coronavirus/2019-ncov/hcp/disposition-in-home-patients.html     CDC: Caring for Someone: www.cdc.gov/coronavirus/2019-ncov/if-you-are-sick/care-for-someone.html     [facility]: Interim Guidance for Hospital Discharge to Home: www.health.Counts include 234 beds at the [facility].[place]/diseases/coronavirus/hcp/hospdischarge.pdf    [facility] clinical trials (COVID-19 research studies): [email or web address]/umn-[email or web address]-trials     Below are the COVID-19 hotlines at the [place] [facility] ([facility]). Interpreters are available.   o For  health questions: Call 005-059-7201 or 1-110.198.8851 (7 a.m. to 7 p.m.)  o For questions about schools and childcare: Call 399-128-0954 or 1-821.504.3578 (7 a.m. to 7 p.m.)                         Reason for Disposition    Any break in skin from BITE (e.g., cut, puncture, or scratch) and WILD animal at RISK FOR RABIES (e.g., bat, raccoon, tobias, skunk, coyote, other carnivores)    Additional Information    Negative: Major bleeding (actively dripping or spurting) that can't be stopped    Negative: Sounds like a life-threatening emergency to the triager    Protocols used: ANIMAL BITE-A-OH